# Patient Record
Sex: MALE | Race: WHITE | Employment: FULL TIME | URBAN - METROPOLITAN AREA
[De-identification: names, ages, dates, MRNs, and addresses within clinical notes are randomized per-mention and may not be internally consistent; named-entity substitution may affect disease eponyms.]

---

## 2018-04-11 ENCOUNTER — HOSPITAL ENCOUNTER (OUTPATIENT)
Dept: PREADMISSION TESTING | Age: 63
Discharge: HOME | End: 2018-04-11
Payer: COMMERCIAL

## 2018-04-11 DIAGNOSIS — Z01.818 PREOP EXAMINATION: ICD-10-CM

## 2018-04-11 LAB
ANION GAP SERPL CALC-SCNC: 3 MMOL/L (ref 3–18)
BACTERIA SPEC CULT: NORMAL
BUN SERPL-MCNC: 12 MG/DL (ref 7–18)
BUN/CREAT SERPL: 12 (ref 12–20)
CALCIUM SERPL-MCNC: 9 MG/DL (ref 8.5–10.1)
CHLORIDE SERPL-SCNC: 105 MMOL/L (ref 100–108)
CO2 SERPL-SCNC: 34 MMOL/L (ref 21–32)
CREAT SERPL-MCNC: 1 MG/DL (ref 0.6–1.3)
EST. AVERAGE GLUCOSE BLD GHB EST-MCNC: 151 MG/DL
GLUCOSE SERPL-MCNC: 133 MG/DL (ref 74–99)
HBA1C MFR BLD: 6.9 % (ref 4.5–5.6)
HCT VFR BLD AUTO: 46.5 % (ref 36–48)
HGB BLD-MCNC: 15.4 G/DL (ref 13–16)
POTASSIUM SERPL-SCNC: 5 MMOL/L (ref 3.5–5.5)
SERVICE CMNT-IMP: NORMAL
SODIUM SERPL-SCNC: 142 MMOL/L (ref 136–145)

## 2018-04-11 PROCEDURE — 36415 COLL VENOUS BLD VENIPUNCTURE: CPT | Performed by: ANESTHESIOLOGY

## 2018-04-11 PROCEDURE — 83036 HEMOGLOBIN GLYCOSYLATED A1C: CPT | Performed by: ANESTHESIOLOGY

## 2018-04-11 PROCEDURE — 93005 ELECTROCARDIOGRAM TRACING: CPT

## 2018-04-11 PROCEDURE — 80048 BASIC METABOLIC PNL TOTAL CA: CPT | Performed by: ANESTHESIOLOGY

## 2018-04-11 PROCEDURE — 87641 MR-STAPH DNA AMP PROBE: CPT | Performed by: SPECIALIST

## 2018-04-11 PROCEDURE — 85018 HEMOGLOBIN: CPT | Performed by: ANESTHESIOLOGY

## 2018-04-12 LAB
ATRIAL RATE: 66 BPM
CALCULATED P AXIS, ECG09: 35 DEGREES
CALCULATED R AXIS, ECG10: 22 DEGREES
CALCULATED T AXIS, ECG11: 20 DEGREES
DIAGNOSIS, 93000: NORMAL
P-R INTERVAL, ECG05: 188 MS
Q-T INTERVAL, ECG07: 410 MS
QRS DURATION, ECG06: 86 MS
QTC CALCULATION (BEZET), ECG08: 429 MS
VENTRICULAR RATE, ECG03: 66 BPM

## 2018-04-17 ENCOUNTER — ANESTHESIA EVENT (OUTPATIENT)
Dept: SURGERY | Age: 63
DRG: 473 | End: 2018-04-17
Payer: COMMERCIAL

## 2018-04-18 ENCOUNTER — APPOINTMENT (OUTPATIENT)
Dept: GENERAL RADIOLOGY | Age: 63
DRG: 473 | End: 2018-04-18
Attending: SPECIALIST
Payer: COMMERCIAL

## 2018-04-18 ENCOUNTER — HOSPITAL ENCOUNTER (INPATIENT)
Age: 63
LOS: 1 days | Discharge: HOME | DRG: 473 | End: 2018-04-19
Attending: SPECIALIST | Admitting: SPECIALIST
Payer: COMMERCIAL

## 2018-04-18 ENCOUNTER — ANESTHESIA (OUTPATIENT)
Dept: SURGERY | Age: 63
DRG: 473 | End: 2018-04-18
Payer: COMMERCIAL

## 2018-04-18 DIAGNOSIS — M47.22 CERVICAL SPONDYLOSIS WITH RADICULOPATHY: Primary | ICD-10-CM

## 2018-04-18 LAB
ABO + RH BLD: NORMAL
BLOOD GROUP ANTIBODIES SERPL: NORMAL
GLUCOSE BLD STRIP.AUTO-MCNC: 133 MG/DL (ref 70–110)
GLUCOSE BLD STRIP.AUTO-MCNC: 137 MG/DL (ref 70–110)
SPECIMEN EXP DATE BLD: NORMAL

## 2018-04-18 PROCEDURE — 77030002933 HC SUT MCRYL J&J -A: Performed by: SPECIALIST

## 2018-04-18 PROCEDURE — 74011250636 HC RX REV CODE- 250/636

## 2018-04-18 PROCEDURE — 74011250636 HC RX REV CODE- 250/636: Performed by: SPECIALIST

## 2018-04-18 PROCEDURE — 77030036596 HC SPCR CERV FORGE GLMB -G: Performed by: SPECIALIST

## 2018-04-18 PROCEDURE — 76060000035 HC ANESTHESIA 2 TO 2.5 HR: Performed by: SPECIALIST

## 2018-04-18 PROCEDURE — 77030018836 HC SOL IRR NACL ICUM -A: Performed by: SPECIALIST

## 2018-04-18 PROCEDURE — 77030011247 HC DRN WND KT TLS STRY -B: Performed by: SPECIALIST

## 2018-04-18 PROCEDURE — 77030011640 HC PAD GRND REM COVD -A: Performed by: SPECIALIST

## 2018-04-18 PROCEDURE — 77030013079 HC BLNKT BAIR HGGR 3M -A: Performed by: ANESTHESIOLOGY

## 2018-04-18 PROCEDURE — 76010000131 HC OR TIME 2 TO 2.5 HR: Performed by: SPECIALIST

## 2018-04-18 PROCEDURE — 82962 GLUCOSE BLOOD TEST: CPT

## 2018-04-18 PROCEDURE — 74011250637 HC RX REV CODE- 250/637: Performed by: SPECIALIST

## 2018-04-18 PROCEDURE — 36415 COLL VENOUS BLD VENIPUNCTURE: CPT | Performed by: SPECIALIST

## 2018-04-18 PROCEDURE — 0RG10K0 FUSION OF CERVICAL VERTEBRAL JOINT WITH NONAUTOLOGOUS TISSUE SUBSTITUTE, ANTERIOR APPROACH, ANTERIOR COLUMN, OPEN APPROACH: ICD-10-PCS | Performed by: SPECIALIST

## 2018-04-18 PROCEDURE — C1713 ANCHOR/SCREW BN/BN,TIS/BN: HCPCS | Performed by: SPECIALIST

## 2018-04-18 PROCEDURE — 77030032490 HC SLV COMPR SCD KNE COVD -B: Performed by: SPECIALIST

## 2018-04-18 PROCEDURE — 77030020268 HC MISC GENERAL SUPPLY: Performed by: SPECIALIST

## 2018-04-18 PROCEDURE — 77030020782 HC GWN BAIR PAWS FLX 3M -B: Performed by: SPECIALIST

## 2018-04-18 PROCEDURE — 65270000029 HC RM PRIVATE

## 2018-04-18 PROCEDURE — 86901 BLOOD TYPING SEROLOGIC RH(D): CPT | Performed by: SPECIALIST

## 2018-04-18 PROCEDURE — 77030008477 HC STYL SATN SLP COVD -A: Performed by: ANESTHESIOLOGY

## 2018-04-18 PROCEDURE — 74011000250 HC RX REV CODE- 250

## 2018-04-18 PROCEDURE — 74011000250 HC RX REV CODE- 250: Performed by: SPECIALIST

## 2018-04-18 PROCEDURE — 77030030163 HC BN WAX J&J -A: Performed by: SPECIALIST

## 2018-04-18 PROCEDURE — 77030008683 HC TU ET CUF COVD -A: Performed by: ANESTHESIOLOGY

## 2018-04-18 PROCEDURE — 77030003028 HC SUT VCRL J&J -A: Performed by: SPECIALIST

## 2018-04-18 PROCEDURE — 0RT30ZZ RESECTION OF CERVICAL VERTEBRAL DISC, OPEN APPROACH: ICD-10-PCS | Performed by: SPECIALIST

## 2018-04-18 PROCEDURE — 77030003666 HC NDL SPINAL BD -A: Performed by: SPECIALIST

## 2018-04-18 PROCEDURE — 97161 PT EVAL LOW COMPLEX 20 MIN: CPT

## 2018-04-18 PROCEDURE — 77030004391 HC BUR FLUT MEDT -C: Performed by: SPECIALIST

## 2018-04-18 PROCEDURE — 77030002916 HC SUT ETHLN J&J -A: Performed by: SPECIALIST

## 2018-04-18 PROCEDURE — 77030006643: Performed by: ANESTHESIOLOGY

## 2018-04-18 PROCEDURE — 76210000017 HC OR PH I REC 1.5 TO 2 HR: Performed by: SPECIALIST

## 2018-04-18 PROCEDURE — 77030020269 HC MISC IMPL: Performed by: SPECIALIST

## 2018-04-18 PROCEDURE — 8E09XBF COMPUTER ASSISTED PROCEDURE OF HEAD AND NECK REGION, WITH FLUOROSCOPY: ICD-10-PCS | Performed by: SPECIALIST

## 2018-04-18 PROCEDURE — 74011250636 HC RX REV CODE- 250/636: Performed by: ANESTHESIOLOGY

## 2018-04-18 DEVICE — IMPLANTABLE DEVICE: Type: IMPLANTABLE DEVICE | Site: SPINE CERVICAL | Status: FUNCTIONAL

## 2018-04-18 RX ORDER — ONDANSETRON 2 MG/ML
4 INJECTION INTRAMUSCULAR; INTRAVENOUS
Status: DISCONTINUED | OUTPATIENT
Start: 2018-04-18 | End: 2018-04-19 | Stop reason: HOSPADM

## 2018-04-18 RX ORDER — SODIUM CHLORIDE, SODIUM LACTATE, POTASSIUM CHLORIDE, CALCIUM CHLORIDE 600; 310; 30; 20 MG/100ML; MG/100ML; MG/100ML; MG/100ML
125 INJECTION, SOLUTION INTRAVENOUS CONTINUOUS
Status: DISCONTINUED | OUTPATIENT
Start: 2018-04-18 | End: 2018-04-18 | Stop reason: HOSPADM

## 2018-04-18 RX ORDER — INSULIN LISPRO 100 [IU]/ML
INJECTION, SOLUTION INTRAVENOUS; SUBCUTANEOUS
Status: DISCONTINUED | OUTPATIENT
Start: 2018-04-18 | End: 2018-04-19 | Stop reason: HOSPADM

## 2018-04-18 RX ORDER — HYDROMORPHONE HYDROCHLORIDE 2 MG/ML
0.5 INJECTION, SOLUTION INTRAMUSCULAR; INTRAVENOUS; SUBCUTANEOUS
Status: DISCONTINUED | OUTPATIENT
Start: 2018-04-18 | End: 2018-04-18 | Stop reason: RX

## 2018-04-18 RX ORDER — NEOSTIGMINE METHYLSULFATE 1 MG/ML
INJECTION INTRAVENOUS AS NEEDED
Status: DISCONTINUED | OUTPATIENT
Start: 2018-04-18 | End: 2018-04-18 | Stop reason: HOSPADM

## 2018-04-18 RX ORDER — METOCLOPRAMIDE HYDROCHLORIDE 5 MG/ML
INJECTION INTRAMUSCULAR; INTRAVENOUS AS NEEDED
Status: DISCONTINUED | OUTPATIENT
Start: 2018-04-18 | End: 2018-04-18 | Stop reason: HOSPADM

## 2018-04-18 RX ORDER — MIDAZOLAM HYDROCHLORIDE 1 MG/ML
INJECTION, SOLUTION INTRAMUSCULAR; INTRAVENOUS AS NEEDED
Status: DISCONTINUED | OUTPATIENT
Start: 2018-04-18 | End: 2018-04-18 | Stop reason: HOSPADM

## 2018-04-18 RX ORDER — ACETAMINOPHEN 10 MG/ML
1000 INJECTION, SOLUTION INTRAVENOUS ONCE
Status: COMPLETED | OUTPATIENT
Start: 2018-04-18 | End: 2018-04-18

## 2018-04-18 RX ORDER — DIPHENHYDRAMINE HCL 25 MG
25 CAPSULE ORAL
Status: DISCONTINUED | OUTPATIENT
Start: 2018-04-18 | End: 2018-04-19 | Stop reason: HOSPADM

## 2018-04-18 RX ORDER — SODIUM CHLORIDE 0.9 % (FLUSH) 0.9 %
5-10 SYRINGE (ML) INJECTION EVERY 8 HOURS
Status: DISCONTINUED | OUTPATIENT
Start: 2018-04-18 | End: 2018-04-19 | Stop reason: HOSPADM

## 2018-04-18 RX ORDER — GLYCOPYRROLATE 0.2 MG/ML
INJECTION INTRAMUSCULAR; INTRAVENOUS AS NEEDED
Status: DISCONTINUED | OUTPATIENT
Start: 2018-04-18 | End: 2018-04-18 | Stop reason: HOSPADM

## 2018-04-18 RX ORDER — OXYCODONE AND ACETAMINOPHEN 5; 325 MG/1; MG/1
1 TABLET ORAL
Status: DISCONTINUED | OUTPATIENT
Start: 2018-04-18 | End: 2018-04-19 | Stop reason: HOSPADM

## 2018-04-18 RX ORDER — SODIUM CHLORIDE 0.9 % (FLUSH) 0.9 %
5-10 SYRINGE (ML) INJECTION AS NEEDED
Status: DISCONTINUED | OUTPATIENT
Start: 2018-04-18 | End: 2018-04-18 | Stop reason: HOSPADM

## 2018-04-18 RX ORDER — CEFAZOLIN SODIUM/WATER 2 G/20 ML
2 SYRINGE (ML) INTRAVENOUS EVERY 8 HOURS
Status: DISCONTINUED | OUTPATIENT
Start: 2018-04-18 | End: 2018-04-19 | Stop reason: HOSPADM

## 2018-04-18 RX ORDER — ONDANSETRON 2 MG/ML
INJECTION INTRAMUSCULAR; INTRAVENOUS AS NEEDED
Status: DISCONTINUED | OUTPATIENT
Start: 2018-04-18 | End: 2018-04-18 | Stop reason: HOSPADM

## 2018-04-18 RX ORDER — INSULIN LISPRO 100 [IU]/ML
INJECTION, SOLUTION INTRAVENOUS; SUBCUTANEOUS ONCE
Status: DISCONTINUED | OUTPATIENT
Start: 2018-04-18 | End: 2018-04-18 | Stop reason: HOSPADM

## 2018-04-18 RX ORDER — NALOXONE HYDROCHLORIDE 0.4 MG/ML
0.4 INJECTION, SOLUTION INTRAMUSCULAR; INTRAVENOUS; SUBCUTANEOUS AS NEEDED
Status: DISCONTINUED | OUTPATIENT
Start: 2018-04-18 | End: 2018-04-19 | Stop reason: HOSPADM

## 2018-04-18 RX ORDER — ROCURONIUM BROMIDE 10 MG/ML
INJECTION, SOLUTION INTRAVENOUS AS NEEDED
Status: DISCONTINUED | OUTPATIENT
Start: 2018-04-18 | End: 2018-04-18 | Stop reason: HOSPADM

## 2018-04-18 RX ORDER — DEXAMETHASONE SODIUM PHOSPHATE 4 MG/ML
INJECTION, SOLUTION INTRA-ARTICULAR; INTRALESIONAL; INTRAMUSCULAR; INTRAVENOUS; SOFT TISSUE AS NEEDED
Status: DISCONTINUED | OUTPATIENT
Start: 2018-04-18 | End: 2018-04-18 | Stop reason: HOSPADM

## 2018-04-18 RX ORDER — DEXAMETHASONE SODIUM PHOSPHATE 4 MG/ML
4 INJECTION, SOLUTION INTRA-ARTICULAR; INTRALESIONAL; INTRAMUSCULAR; INTRAVENOUS; SOFT TISSUE EVERY 8 HOURS
Status: DISCONTINUED | OUTPATIENT
Start: 2018-04-18 | End: 2018-04-19 | Stop reason: HOSPADM

## 2018-04-18 RX ORDER — METFORMIN HYDROCHLORIDE 500 MG/1
1000 TABLET ORAL 2 TIMES DAILY WITH MEALS
Status: DISCONTINUED | OUTPATIENT
Start: 2018-04-18 | End: 2018-04-19 | Stop reason: HOSPADM

## 2018-04-18 RX ORDER — DEXTROSE 50 % IN WATER (D50W) INTRAVENOUS SYRINGE
25-50 AS NEEDED
Status: DISCONTINUED | OUTPATIENT
Start: 2018-04-18 | End: 2018-04-18 | Stop reason: HOSPADM

## 2018-04-18 RX ORDER — SODIUM CHLORIDE 0.9 % (FLUSH) 0.9 %
5-10 SYRINGE (ML) INJECTION AS NEEDED
Status: DISCONTINUED | OUTPATIENT
Start: 2018-04-18 | End: 2018-04-19 | Stop reason: HOSPADM

## 2018-04-18 RX ORDER — SIMVASTATIN 20 MG/1
20 TABLET, FILM COATED ORAL
Status: DISCONTINUED | OUTPATIENT
Start: 2018-04-18 | End: 2018-04-19 | Stop reason: HOSPADM

## 2018-04-18 RX ORDER — DOCUSATE SODIUM 100 MG/1
100 CAPSULE, LIQUID FILLED ORAL 2 TIMES DAILY
Status: DISCONTINUED | OUTPATIENT
Start: 2018-04-18 | End: 2018-04-19 | Stop reason: HOSPADM

## 2018-04-18 RX ORDER — SODIUM CHLORIDE, SODIUM LACTATE, POTASSIUM CHLORIDE, CALCIUM CHLORIDE 600; 310; 30; 20 MG/100ML; MG/100ML; MG/100ML; MG/100ML
125 INJECTION, SOLUTION INTRAVENOUS CONTINUOUS
Status: DISCONTINUED | OUTPATIENT
Start: 2018-04-18 | End: 2018-04-19 | Stop reason: HOSPADM

## 2018-04-18 RX ORDER — HYDROMORPHONE HYDROCHLORIDE 2 MG/ML
INJECTION, SOLUTION INTRAMUSCULAR; INTRAVENOUS; SUBCUTANEOUS AS NEEDED
Status: DISCONTINUED | OUTPATIENT
Start: 2018-04-18 | End: 2018-04-18 | Stop reason: HOSPADM

## 2018-04-18 RX ORDER — LISINOPRIL 20 MG/1
20 TABLET ORAL
Status: DISCONTINUED | OUTPATIENT
Start: 2018-04-18 | End: 2018-04-19 | Stop reason: HOSPADM

## 2018-04-18 RX ORDER — HYDROMORPHONE HYDROCHLORIDE 2 MG/ML
0.5 INJECTION, SOLUTION INTRAMUSCULAR; INTRAVENOUS; SUBCUTANEOUS
Status: DISCONTINUED | OUTPATIENT
Start: 2018-04-18 | End: 2018-04-18 | Stop reason: HOSPADM

## 2018-04-18 RX ORDER — CEFAZOLIN SODIUM 2 G/50ML
2 SOLUTION INTRAVENOUS ONCE
Status: COMPLETED | OUTPATIENT
Start: 2018-04-18 | End: 2018-04-18

## 2018-04-18 RX ORDER — FENTANYL CITRATE 50 UG/ML
INJECTION, SOLUTION INTRAMUSCULAR; INTRAVENOUS AS NEEDED
Status: DISCONTINUED | OUTPATIENT
Start: 2018-04-18 | End: 2018-04-18 | Stop reason: HOSPADM

## 2018-04-18 RX ORDER — PROPOFOL 10 MG/ML
INJECTION, EMULSION INTRAVENOUS AS NEEDED
Status: DISCONTINUED | OUTPATIENT
Start: 2018-04-18 | End: 2018-04-18 | Stop reason: HOSPADM

## 2018-04-18 RX ORDER — PENICILLIN V POTASSIUM 500 MG/1
500 TABLET, FILM COATED ORAL 4 TIMES DAILY
Status: ON HOLD | COMMUNITY
End: 2018-04-18 | Stop reason: ALTCHOICE

## 2018-04-18 RX ORDER — SODIUM CHLORIDE 9 MG/ML
75 INJECTION, SOLUTION INTRAVENOUS CONTINUOUS
Status: DISCONTINUED | OUTPATIENT
Start: 2018-04-18 | End: 2018-04-19 | Stop reason: HOSPADM

## 2018-04-18 RX ORDER — PHENYLEPHRINE HCL IN 0.9% NACL 1 MG/10 ML
SYRINGE (ML) INTRAVENOUS AS NEEDED
Status: DISCONTINUED | OUTPATIENT
Start: 2018-04-18 | End: 2018-04-18 | Stop reason: HOSPADM

## 2018-04-18 RX ORDER — LIDOCAINE HYDROCHLORIDE 20 MG/ML
INJECTION, SOLUTION EPIDURAL; INFILTRATION; INTRACAUDAL; PERINEURAL AS NEEDED
Status: DISCONTINUED | OUTPATIENT
Start: 2018-04-18 | End: 2018-04-18 | Stop reason: HOSPADM

## 2018-04-18 RX ADMIN — ONDANSETRON 4 MG: 2 INJECTION INTRAMUSCULAR; INTRAVENOUS at 13:57

## 2018-04-18 RX ADMIN — ROCURONIUM BROMIDE 20 MG: 10 INJECTION, SOLUTION INTRAVENOUS at 14:05

## 2018-04-18 RX ADMIN — HYDROMORPHONE HYDROCHLORIDE 1 MG: 2 INJECTION, SOLUTION INTRAMUSCULAR; INTRAVENOUS; SUBCUTANEOUS at 14:03

## 2018-04-18 RX ADMIN — NEOSTIGMINE METHYLSULFATE 1 MG: 1 INJECTION INTRAVENOUS at 15:52

## 2018-04-18 RX ADMIN — FENTANYL CITRATE 50 MCG: 50 INJECTION, SOLUTION INTRAMUSCULAR; INTRAVENOUS at 13:41

## 2018-04-18 RX ADMIN — ACETAMINOPHEN 1000 MG: 10 INJECTION, SOLUTION INTRAVENOUS at 14:00

## 2018-04-18 RX ADMIN — LISINOPRIL 20 MG: 20 TABLET ORAL at 22:00

## 2018-04-18 RX ADMIN — GLYCOPYRROLATE 0.2 MG: 0.2 INJECTION INTRAMUSCULAR; INTRAVENOUS at 15:52

## 2018-04-18 RX ADMIN — SODIUM CHLORIDE, SODIUM LACTATE, POTASSIUM CHLORIDE, AND CALCIUM CHLORIDE 125 ML/HR: 600; 310; 30; 20 INJECTION, SOLUTION INTRAVENOUS at 18:34

## 2018-04-18 RX ADMIN — HYDROMORPHONE HYDROCHLORIDE: 10 INJECTION, SOLUTION INTRAMUSCULAR; INTRAVENOUS; SUBCUTANEOUS at 16:55

## 2018-04-18 RX ADMIN — DOCUSATE SODIUM 100 MG: 100 CAPSULE, LIQUID FILLED ORAL at 22:00

## 2018-04-18 RX ADMIN — METOCLOPRAMIDE HYDROCHLORIDE 10 MG: 5 INJECTION INTRAMUSCULAR; INTRAVENOUS at 13:57

## 2018-04-18 RX ADMIN — SIMVASTATIN 20 MG: 20 TABLET, FILM COATED ORAL at 21:56

## 2018-04-18 RX ADMIN — MIDAZOLAM HYDROCHLORIDE 4 MG: 1 INJECTION, SOLUTION INTRAMUSCULAR; INTRAVENOUS at 13:41

## 2018-04-18 RX ADMIN — Medication 100 MCG: at 14:41

## 2018-04-18 RX ADMIN — SODIUM CHLORIDE, SODIUM LACTATE, POTASSIUM CHLORIDE, AND CALCIUM CHLORIDE 125 ML/HR: 600; 310; 30; 20 INJECTION, SOLUTION INTRAVENOUS at 11:58

## 2018-04-18 RX ADMIN — GLYCOPYRROLATE 0.2 MG: 0.2 INJECTION INTRAMUSCULAR; INTRAVENOUS at 13:41

## 2018-04-18 RX ADMIN — Medication 100 MCG: at 15:20

## 2018-04-18 RX ADMIN — DEXAMETHASONE SODIUM PHOSPHATE 4 MG: 4 INJECTION, SOLUTION INTRAMUSCULAR; INTRAVENOUS at 21:55

## 2018-04-18 RX ADMIN — FENTANYL CITRATE 100 MCG: 50 INJECTION, SOLUTION INTRAMUSCULAR; INTRAVENOUS at 15:13

## 2018-04-18 RX ADMIN — ROCURONIUM BROMIDE 50 MG: 10 INJECTION, SOLUTION INTRAVENOUS at 13:46

## 2018-04-18 RX ADMIN — Medication 2 G: at 21:56

## 2018-04-18 RX ADMIN — DEXAMETHASONE SODIUM PHOSPHATE 8 MG: 4 INJECTION, SOLUTION INTRA-ARTICULAR; INTRALESIONAL; INTRAMUSCULAR; INTRAVENOUS; SOFT TISSUE at 13:57

## 2018-04-18 RX ADMIN — Medication 10 ML: at 21:56

## 2018-04-18 RX ADMIN — LIDOCAINE HYDROCHLORIDE 100 MG: 20 INJECTION, SOLUTION EPIDURAL; INFILTRATION; INTRACAUDAL; PERINEURAL at 13:46

## 2018-04-18 RX ADMIN — HYDROMORPHONE HYDROCHLORIDE 1 MG: 2 INJECTION, SOLUTION INTRAMUSCULAR; INTRAVENOUS; SUBCUTANEOUS at 14:28

## 2018-04-18 RX ADMIN — Medication 100 MCG: at 14:56

## 2018-04-18 RX ADMIN — PROPOFOL 200 MG: 10 INJECTION, EMULSION INTRAVENOUS at 13:46

## 2018-04-18 RX ADMIN — CEFAZOLIN SODIUM 2 G: 2 SOLUTION INTRAVENOUS at 13:55

## 2018-04-18 RX ADMIN — FENTANYL CITRATE 50 MCG: 50 INJECTION, SOLUTION INTRAMUSCULAR; INTRAVENOUS at 13:46

## 2018-04-18 RX ADMIN — SODIUM CHLORIDE, SODIUM LACTATE, POTASSIUM CHLORIDE, AND CALCIUM CHLORIDE: 600; 310; 30; 20 INJECTION, SOLUTION INTRAVENOUS at 15:26

## 2018-04-18 RX ADMIN — SODIUM CHLORIDE, SODIUM LACTATE, POTASSIUM CHLORIDE, AND CALCIUM CHLORIDE: 600; 310; 30; 20 INJECTION, SOLUTION INTRAVENOUS at 14:38

## 2018-04-18 NOTE — PERIOP NOTES
Bedside report to receiving nurse Rubi OSBORN, current vital signs noted below. Dressing dry and intact. Family moving vehicles, but aware of patient transfer; will come to second floor waiting room as directed. No further questions from receiving nurse. PCA pump verified upon entry to inpatient room.     Visit Vitals    /77 (BP 1 Location: Left arm, BP Patient Position: At rest)    Pulse 89    Temp 98.6 °F (37 °C)    Resp 16    Ht 6' 2\" (1.88 m)    Wt 119.4 kg (263 lb 2 oz)    SpO2 94%    BMI 33.78 kg/m2     Moraima Soni RN

## 2018-04-18 NOTE — PERIOP NOTES
Dr. Moraima Newell paged, and agreeable to removing basal rate from PCA pump. Pump setting changed, and verified with Michelle Velázquez RN and [facility].

## 2018-04-18 NOTE — IP AVS SNAPSHOT
[address] [address] 
[phone] Patient: Rossana May MRN: [id number] FQN:[id number] A check michelle indicates which time of day the medication should be taken. My Medications START taking these medications Instructions Each Dose to Equal  
 Morning Noon Evening Bedtime  
 oxyCODONE-acetaminophen 5-325 mg per tablet Commonly known as:  PERCOCET Your last dose was: Your next dose is: Take 1 Tab by mouth every four (4) hours as needed. Max Daily Amount: 6 Tabs. 1 Tab CONTINUE taking these medications Instructions Each Dose to Equal  
 Morning Noon Evening Bedtime  
 lisinopril 20 mg tablet Commonly known as:  Dorathy Massed Your last dose was: Your next dose is: Take 20 mg by mouth nightly. 20 mg  
    
   
   
   
  
 metFORMIN 500 mg tablet Commonly known as:  GLUCOPHAGE Your last dose was: Your next dose is: Take 1,000 mg by mouth two (2) times daily (with meals). 1000 mg  
    
   
   
   
  
 simvastatin 20 mg tablet Commonly known as:  ZOCOR Your last dose was: Your next dose is: Take  by mouth nightly. traMADol 50 mg tablet Commonly known as:  ULTRAM  
   
Your last dose was: Your next dose is: Take 50 mg by mouth every six (6) hours as needed for Pain. 50 mg  
    
   
   
   
  
  
STOP taking these medications ADVIL 200 mg tablet Generic drug:  ibuprofen ALEVE 220 mg tablet Generic drug:  naproxen sodium ASK your doctor about these medications Instructions Each Dose to Equal  
 Morning Noon Evening Bedtime  
 penicillin v potassium 500 mg tablet Commonly known as:  VEETID Your last dose was: Your next dose is: Take 500 mg by mouth four (4) times daily. 500 mg Where to Get Your Medications Information on where to get these meds will be given to you by the nurse or doctor. ! Ask your nurse or doctor about these medications  
  oxyCODONE-acetaminophen 5-325 mg per tablet

## 2018-04-18 NOTE — IP AVS SNAPSHOT
[address] [address] 
[phone] Patient: Sven Odell MRN: [id number] UXI:[id number] About your hospitalization You were admitted on:  April 18, 2018 You last received care in the:  THE [facility] [address] You were discharged on:  April 19, 2018 Why you were hospitalized Your primary diagnosis was:  Not on File Your diagnoses also included:  Cervical Spondylosis With Radiculopathy, Pain At Surgical Incision Follow-up Information Follow up With Details Comments Contact Info Crista Bowser MD On 5/9/2018 Follow up appointment @ 10:15am [address] [place] [address] 
[phone] MD Eloise Black  
[address] [facility] Internal [address] 
[phone] Discharge Orders None A check michelle indicates which time of day the medication should be taken. My Medications START taking these medications Instructions Each Dose to Equal  
 Morning Noon Evening Bedtime  
 oxyCODONE-acetaminophen 5-325 mg per tablet Commonly known as:  PERCOCET Your last dose was: Your next dose is: Take 1 Tab by mouth every four (4) hours as needed. Max Daily Amount: 6 Tabs. 1 Tab CONTINUE taking these medications Instructions Each Dose to Equal  
 Morning Noon Evening Bedtime  
 lisinopril 20 mg tablet Commonly known as:  Susan Gonzalez Your last dose was: Your next dose is: Take 20 mg by mouth nightly. 20 mg  
    
   
   
   
  
 metFORMIN 500 mg tablet Commonly known as:  GLUCOPHAGE Your last dose was: Your next dose is: Take 1,000 mg by mouth two (2) times daily (with meals). 1000 mg  
    
   
   
   
  
 simvastatin 20 mg tablet Commonly known as:  ZOCOR Your last dose was: Your next dose is: Take  by mouth nightly. traMADol 50 mg tablet Commonly known as:  ULTRAM  
   
Your last dose was: Your next dose is: Take 50 mg by mouth every six (6) hours as needed for Pain. 50 mg  
    
   
   
   
  
  
STOP taking these medications ADVIL 200 mg tablet Generic drug:  ibuprofen ALEVE 220 mg tablet Generic drug:  naproxen sodium ASK your doctor about these medications Instructions Each Dose to Equal  
 Morning Noon Evening Bedtime  
 penicillin v potassium 500 mg tablet Commonly known as:  VEETID Your last dose was: Your next dose is: Take 500 mg by mouth four (4) times daily. 500 mg Where to Get Your Medications Information on where to get these meds will be given to you by the nurse or doctor. ! Ask your nurse or doctor about these medications  
  oxyCODONE-acetaminophen 5-325 mg per tablet Opioid Education Prescription Opioids: What You Need to Know: 
 
Prescription opioids can be used to help relieve moderate-to-severe pain and are often prescribed following a surgery or injury, or for certain health conditions. These medications can be an important part of treatment but also come with serious risks. Opioids are strong pain medicines. Examples include hydrocodone, oxycodone, fentanyl, and morphine. Heroin is an example of an illegal opioid. It is important to work with your health care provider to make sure you are getting the safest, most effective care. WHAT ARE THE RISKS AND SIDE EFFECTS OF OPIOID USE? Prescription opioids carry serious risks of addiction and overdose, especially with prolonged use. An opioid overdose, often marked by slow breathing, can cause sudden death. The use of prescription opioids can have a number of side effects as well, even when taken as directed. · Tolerance-meaning you might need to take more of a medication for the same pain relief · Physical dependence-meaning you have symptoms of withdrawal when the medication is stopped. Withdrawal symptoms can include nausea, sweating, chills, diarrhea, stomach cramps, and muscle aches. Withdrawal can last up to several weeks, depending on which drug you took and how long you took it. · Increased sensitivity to pain · Constipation · Nausea, vomiting, and dry mouth · Sleepiness and dizziness · Confusion · Depression · Low levels of testosterone that can result in lower sex drive, energy, and strength · Itching and sweating RISKS ARE GREATER WITH:      
· History of drug misuse, substance use disorder, or overdose · Mental health conditions (such as depression or anxiety) · Sleep apnea · Older age (72 years or older) · Pregnancy Avoid alcohol while taking prescription opioids. Also, unless specifically advised by your health care provider, medications to avoid include: · Benzodiazepines (such as Xanax or Valium) · Muscle relaxants (such as Soma or Flexeril) · Hypnotics (such as Ambien or Lunesta) · Other prescription opioids KNOW YOUR OPTIONS Talk to your health care provider about ways to manage your pain that don't involve prescription opioids. Some of these options may actually work better and have fewer risks and side effects. Options may include: 
· Pain relievers such as acetaminophen, ibuprofen, and naproxen · Some medications that are also used for depression or seizures · Physical therapy and exercise · Counseling to help patients learn how to cope better with triggers of pain and stress. · Application of heat or cold compress · Massage therapy · Relaxation techniques Be Informed Make sure you know the name of your medication, how much and how often to take it, and its potential risks & side effects.  
 
IF YOU ARE PRESCRIBED OPIOIDS FOR PAIN: 
 · Never take opioids in greater amounts or more often than prescribed. Remember the goal is not to be pain-free but to manage your pain at a tolerable level. · Follow up with your primary care provider to: · Work together to create a plan on how to manage your pain. · Talk about ways to help manage your pain that don't involve prescription opioids. · Talk about any and all concerns and side effects. · Help prevent misuse and abuse. · Never sell or share prescription opioids · Help prevent misuse and abuse. · Store prescription opioids in a secure place and out of reach of others (this may include visitors, children, friends, and family). · Safely dispose of unused/unwanted prescription opioids: Find your community drug take-back program or your pharmacy mail-back program, or flush them down the toilet, following guidance from the Food and Drug Administration (www.fda.gov/Drugs/ResourcesForYou). · Visit www.cdc.gov/drugoverdose to learn about the risks of opioid abuse and overdose. · If you believe you may be struggling with addiction, tell your health care provider and ask for guidance or call [address] [place], [place] [address] at [phone]-JCAY. Discharge Instructions DISCHARGE SUMMARY from Nurse PATIENT INSTRUCTIONS: 
 
 
F-face looks uneven A-arms unable to move or move unevenly S-speech slurred or non-existent T-time-call 911 as soon as signs and symptoms begin-DO NOT go Back to bed or wait to see if you get better-TIME IS BRAIN. Warning Signs of HEART ATTACK Call 911 if you have these symptoms: 
? Chest discomfort.  Most heart attacks involve discomfort in the center of the chest that lasts more than a few minutes, or that goes away and comes back. It can feel like uncomfortable pressure, squeezing, fullness, or pain. ? Discomfort in other areas of the upper body. Symptoms can include pain or discomfort in one or both arms, the back, neck, jaw, or stomach. ? Shortness of breath with or without chest discomfort. ? Other signs may include breaking out in a cold sweat, nausea, or lightheadedness. Don't wait more than five minutes to call [address]! Fast action can save your life. Calling 911 is almost always the fastest way to get lifesaving treatment. Emergency Medical Services staff can begin treatment when they arrive  up to an hour sooner than if someone gets to the hospital by car. The discharge information has been reviewed with the patient. The patient verbalized understanding. Discharge medications reviewed with the patient and appropriate educational materials and side effects teaching were provided. ___________________________________________________________________________________________________________________________________ Gamer Guides[employer] Announcement We are excited to announce that we are making your provider's discharge notes available to you in Vayusa. You will see these notes when they are completed and signed by the physician that discharged you from your recent hospital stay. If you have any questions or concerns about any information you see in Sentrigot, please call the Health Information Department where you were seen or reach out to your Primary Care Provider for more information about your plan of care. Introducing [facility] & HEALTH SERVICES! [employer] introduces Vayusa patient portal. Now you can access parts of your medical record, email your doctor's office, and request medication refills online. 1. In your internet browser, go to [email or web address] 2. Click on the First Time User? Click Here link in the Sign In box. You will see the New Member Sign Up page. 3. Enter your [employer] Access Code exactly as it appears below. You will not need to use this code after youve completed the sign-up process. If you do not sign up before the expiration date, you must request a new code. · [employer] Access Code: [email or web address] Expires: 7/10/2018 12:35 PM 
 
4. Enter the last four digits of your Social Security Number (xxxx) and Date of Birth (mm/dd/yyyy) as indicated and click Submit. You will be taken to the next sign-up page. 5. Create a [employer] ID. This will be your [employer] login ID and cannot be changed, so think of one that is secure and easy to remember. 6. Create a [employer] password. You can change your password at any time. 7. Enter your Password Reset Question and Answer. This can be used at a later time if you forget your password. 8. Enter your e-mail address. You will receive e-mail notification when new information is available in [address] 9. Click Sign Up. You can now view and download portions of your medical record. 10. Click the Download Summary menu link to download a portable copy of your medical information. If you have questions, please visit the Frequently Asked Questions section of the [employer] website. Remember, [employer] is NOT to be used for urgent needs. For medical emergencies, dial 911. Now available from your iPhone and Android! Introducing Cricket Harley As a [employer] patient, I wanted to make you aware of our electronic visit tool called Cricket Swiftalinakimberly. [employer] 24/7 allows you to connect within minutes with a medical provider 24 hours a day, seven days a week via a mobile device or tablet or logging into a secure website from your computer. You can access Cricket Harley from anywhere in the [place].  
 
A virtual visit might be right for you when you have a simple condition and feel like you just dont want to get out of bed, or cant get away from work for an appointment, when your regular [facility] provider is not available (evenings, weekends or holidays), or when youre out of town and need minor care. Electronic visits cost only $49 and if the [facility] 24/7 provider determines a prescription is needed to treat your condition, one can be electronically transmitted to a nearby pharmacy*. Please take a moment to enroll today if you have not already done so. The enrollment process is free and takes just a few minutes. To enroll, please download the YogaTrail/[employer] lonnie to your tablet or phone, or visit [email or web address] to enroll on your computer. And, as an [address] [place] [address] patient with a [employer] account, the results of your visits will be scanned into your electronic medical record and your primary care provider will be able to view the scanned results. We urge you to continue to see your regular [facility] provider for your ongoing medical care. And while your primary care provider may not be the one available when you seek a [employer] virtual visit, the peace of mind you get from getting a real diagnosis real time can be priceless. For more information on [employer], view our Frequently Asked Questions (FAQs) at [email or web address]. Sincerely, 
 
Amisha Molina MD 
Chief Medical Officer Ana Nelly Rush *:  certain medications cannot be prescribed via [employer] Unresulted Labs-Please follow up with your PCP about these lab tests Order Current Status NC XR TECHNOLOGIST SERVICE In process Providers Seen During Your Hospitalization Provider Specialty Primary office phone Clarissa Richardson MD Neurosurgery [phone] Your Primary Care Physician (PCP) Primary Care Physician Office Phone Office Fax Zeynep Winter [phone] You are allergic to the following No active allergies Recent Documentation Height Weight BMI Smoking Status 1.88 m 119.4 kg 33.78 kg/m2 Never Smoker Emergency Contacts Name Discharge Info Relation Home Work Mobile [address] CAREGIVER [3] Spouse [3] [phone] Patient Belongings The following personal items are in your possession at time of discharge: 
  Dental Appliances: None  Visual Aid: None      Home Medications: None   Jewelry: None  Clothing: Pants, Sent home, Shirt, Undergarments, Footwear, Socks, Jacket/Coat (desmond beal)    Other Valuables: None (states jose g has his cell and wallet) Please provide this summary of care documentation to your next provider. Signatures-by signing, you are acknowledging that this After Visit Summary has been reviewed with you and you have received a copy. Patient Signature:  ____________________________________________________________ Date:  ____________________________________________________________  
  
[facility] Provider Signature:  ____________________________________________________________ Date:  ____________________________________________________________

## 2018-04-18 NOTE — PERIOP NOTES
Rubi OSBORN on [address] [place] [address] not available to take report at this time, awaiting call back.

## 2018-04-18 NOTE — PERIOP NOTES
Awaiting room and RN assignment from Second floor. Family attempted to be called, but did not answer phone; will retry.

## 2018-04-18 NOTE — PERIOP NOTES
Dilaudid PCA pump connected to patient, and verified with Jacqueline Cisneros RN. Patient instructed on how to use pump, and verbalized understanding.

## 2018-04-18 NOTE — ANESTHESIA POSTPROCEDURE EVALUATION
Post-Anesthesia Evaluation and Assessment    Cardiovascular Function/Vital Signs  Visit Vitals    /76 (BP 1 Location: Left arm, BP Patient Position: At rest)    Pulse 74    Temp 36.6 °C (97.8 °F)    Resp 14    Ht 6' 2\" (1.88 m)    Wt 119.4 kg (263 lb 2 oz)    SpO2 100%    BMI 33.78 kg/m2       Patient is status post Procedure(s):  ANTERIOR CERVICAL DISKECTOMY & FUSION (C6-7) BONE AND PLATING W/C-ARM. Nausea/Vomiting: Controlled. Postoperative hydration reviewed and adequate. Pain:  Pain Scale 1: Numeric (0 - 10) (04/18/18 1750)  Pain Intensity 1: 0 (04/18/18 1750)   Managed. Neurological Status:   Neuro (WDL): Exceptions to WDL (04/18/18 1635)   At baseline. Mental Status and Level of Consciousness: Baseline and stable. Pulmonary Status:   O2 Device: Nasal cannula (04/18/18 1750)   Adequate oxygenation and airway patent. Complications related to anesthesia: None    Post-anesthesia assessment completed. No concerns. Patient has met all discharge requirements.     Signed By: Betsy Apodaca MD

## 2018-04-18 NOTE — PERIOP NOTES
TRANSFER - OUT REPORT:    Verbal report given to [facility] RN on [place] [facility]  being transferred to [address] [place] [address](unit) for routine progression of care       Report consisted of patients Situation, Background, Assessment and   Recommendations(SBAR). Information from the following report(s) SBAR and Procedure Summary was reviewed with the receiving nurse. Lines:   Peripheral IV 04/18/18 Right Forearm (Active)   Site Assessment Clean, dry, & intact 4/18/2018  4:39 PM   Phlebitis Assessment 0 4/18/2018  4:39 PM   Infiltration Assessment 0 4/18/2018  4:39 PM   Dressing Status Clean, dry, & intact 4/18/2018  4:39 PM   Dressing Type Transparent;Tape 4/18/2018  4:39 PM   Hub Color/Line Status Pink; Infusing;Patent 4/18/2018  4:39 PM      Visit Vitals    /82    Pulse 84    Temp 97.8 °F (36.6 °C)    Resp 18    Ht 6' 2\" (1.88 m)    Wt 119.4 kg (263 lb 2 oz)    SpO2 99%    BMI 33.78 kg/m2       Opportunity for questions and clarification was provided.       Patient transported with:   O2 @ 2 liters  Registered Nurse  Matt Stover RN

## 2018-04-18 NOTE — ANESTHESIA PREPROCEDURE EVALUATION
Anesthetic History   No history of anesthetic complications            Review of Systems / Medical History  Patient summary reviewed, nursing notes reviewed and pertinent labs reviewed    Pulmonary  Within defined limits                 Neuro/Psych   Within defined limits           Cardiovascular    Hypertension              Exercise tolerance: >4 METS     GI/Hepatic/Renal     GERD: well controlled           Endo/Other    Diabetes  Hypothyroidism  Arthritis     Other Findings            Physical Exam    Airway  Mallampati: II  TM Distance: 4 - 6 cm  Neck ROM: normal range of motion   Mouth opening: Normal     Cardiovascular  Regular rate and rhythm,  S1 and S2 normal,  no murmur, click, rub, or gallop  Rhythm: regular  Rate: normal         Dental    Dentition: Caps/crowns     Pulmonary  Breath sounds clear to auscultation               Abdominal  GI exam deferred       Other Findings            Anesthetic Plan    ASA: 2  Anesthesia type: general          Induction: Intravenous  Anesthetic plan and risks discussed with: Patient and Spouse      Patient and spouse informed of risk of dental damage periop. They understand and desire to proceed.

## 2018-04-19 VITALS
WEIGHT: 263.13 LBS | TEMPERATURE: 98 F | HEART RATE: 89 BPM | DIASTOLIC BLOOD PRESSURE: 84 MMHG | OXYGEN SATURATION: 95 % | RESPIRATION RATE: 16 BRPM | HEIGHT: 74 IN | SYSTOLIC BLOOD PRESSURE: 120 MMHG | BODY MASS INDEX: 33.77 KG/M2

## 2018-04-19 PROBLEM — L76.82 PAIN AT SURGICAL INCISION: Status: ACTIVE | Noted: 2018-04-19

## 2018-04-19 LAB
GLUCOSE BLD STRIP.AUTO-MCNC: 189 MG/DL (ref 70–110)
GLUCOSE BLD STRIP.AUTO-MCNC: 221 MG/DL (ref 70–110)

## 2018-04-19 PROCEDURE — 74011250636 HC RX REV CODE- 250/636: Performed by: SPECIALIST

## 2018-04-19 PROCEDURE — 82962 GLUCOSE BLOOD TEST: CPT

## 2018-04-19 PROCEDURE — 74011250637 HC RX REV CODE- 250/637: Performed by: SPECIALIST

## 2018-04-19 PROCEDURE — 99218 HC RM OBSERVATION: CPT

## 2018-04-19 PROCEDURE — 74011636637 HC RX REV CODE- 636/637: Performed by: SPECIALIST

## 2018-04-19 PROCEDURE — 97116 GAIT TRAINING THERAPY: CPT

## 2018-04-19 RX ORDER — OXYCODONE AND ACETAMINOPHEN 5; 325 MG/1; MG/1
1 TABLET ORAL
Qty: 30 TAB | Refills: 0 | Status: SHIPPED | OUTPATIENT
Start: 2018-04-19 | End: 2018-06-17

## 2018-04-19 RX ADMIN — Medication 2 G: at 06:21

## 2018-04-19 RX ADMIN — INSULIN LISPRO 2 UNITS: 100 INJECTION, SOLUTION INTRAVENOUS; SUBCUTANEOUS at 09:02

## 2018-04-19 RX ADMIN — INSULIN LISPRO 4 UNITS: 100 INJECTION, SOLUTION INTRAVENOUS; SUBCUTANEOUS at 00:28

## 2018-04-19 RX ADMIN — DEXAMETHASONE SODIUM PHOSPHATE 4 MG: 4 INJECTION, SOLUTION INTRAMUSCULAR; INTRAVENOUS at 06:21

## 2018-04-19 RX ADMIN — DOCUSATE SODIUM 100 MG: 100 CAPSULE, LIQUID FILLED ORAL at 09:02

## 2018-04-19 RX ADMIN — Medication 10 ML: at 06:21

## 2018-04-19 RX ADMIN — METFORMIN HYDROCHLORIDE 1000 MG: 500 TABLET, FILM COATED ORAL at 09:02

## 2018-04-19 NOTE — ROUTINE PROCESS
Bedside and Verbal shift change report given to BETTY Davidson RN (oncoming nurse) by Tim Brice RN (offgoing nurse). Report included the following information SBAR, Kardex and MAR.

## 2018-04-19 NOTE — ROUTINE PROCESS
TRANSFER - IN REPORT:    Verbal report received from JADEN Buchanan RN(name) on Niki Farr  being received from [email or web address]) for routine progression of care      Report consisted of patients Situation, Background, Assessment and   Recommendations(SBAR). Information from the following report(s) SBAR, Kardex, Intake/Output and MAR was reviewed with the receiving nurse. Opportunity for questions and clarification was provided. Assessment completed upon patients arrival to unit and care assumed. Skin assessment completed on arrival to floor with Aysha OSBORN. No skin issues found, other than expected incision to anterior neck with dressing in place.

## 2018-04-19 NOTE — PROGRESS NOTES
1851- Patient arrives to unit at this time. Admission completed at this time. Patient is A/O x 4. IV to right forearm intact and patent. SCDs  applied to bilateral legs. Tegaderm with occlusive dressing to anterior neck CDI. Cervical collar in place. No numbness/tingling. Radial and pedal pulses palpable. Pain 0/10. Patient was oriented to the room to include use of call bell, meal ordering, and use of incentive spirometer. Patient was given explanation of \" up for dinner\" program and has verbalized understanding. Phone and call bell left within reach. Plan of care for the day addressed with patient. Educated on pain medication availability and possible side effects.

## 2018-04-19 NOTE — PROGRESS NOTES
Problem: Mobility Impaired (Adult and Pediatric)  Goal: *Acute Goals and Plan of Care (Insert Text)  Goals to be addressed in 1-4 days:  STG  1. Rolling L to R to L modified independent for positioning. 2. Supine to sit to supine S with HR for meals. 3. Sit to stand to sit S with RW in prep for ambulation. LTG  1. Ambulate >150ft S with RW, WBAT, for home/community mobility. 2. Ascend/descend a >4 stair steps CGA with HR for home entry. 3. Tolerate up in chair 1-2 hours for ADLs. 4. Patient/family to be independent with HEP for follow-up care and safe discharge. Outcome: Resolved/Met Date Met: 04/19/18  physical Therapy TREATMENT/DISCHARGE    Patient: Jony Mensah (89 y.o. male)  Date: 4/19/2018  Diagnosis: CERVICAL HNP  Cervical spondylosis with radiculopathy  Cervical spondylosis with radiculopathy <principal problem not specified>  Procedure(s) (LRB):  ANTERIOR CERVICAL DISKECTOMY & FUSION (C6-7) BONE AND PLATING W/C-ARM (N/A) 1 Day Post-Op  Precautions: Fall  Chart, physical therapy assessment, plan of care and goals were reviewed. ASSESSMENT:  Patient has met short and long term goals at this time. Patient performed sit to/from stand with [place], ambulated 200 ft without AD, Mod!, and ascended/descended 6 stairs with R handrails and Estiven. Pt is cleared from inpatient physical therapy services at this time and home health physical therapy is recommended upon discharge from hospital.  Progression toward goals:  [x]      Goals met  []      Improving appropriately and progressing toward goals  []      Improving slowly and progressing toward goals  []      Not making progress toward goals and plan of care will be adjusted     PLAN:  Patient will be discharged from physical therapy at this time.   Rationale for discharge:  [x] Goals Achieved  [] [address]  [] Patient not participating in therapy  [] Other:  Discharge Recommendations:  Home Health vs Outpatient  Further Equipment Recommendations for Discharge:  N/A     SUBJECTIVE:   Patient stated I am doing a little better today, throat still sore.     OBJECTIVE DATA SUMMARY:   Critical Behavior:  Neurologic State: Alert, Appropriate for age  Functional Mobility Training:  Bed Mobility:  Supine to Sit: Modified independent  Sit to Supine: Modified independent  Transfers:  Sit to Stand: Modified independent  Stand to Sit: Modified independent  Balance:  Sitting: Intact  Standing: Intact; Without support  Ambulation/Gait Training:  Distance (ft): 200 Feet (ft)  Assistive Device: Gait belt  Ambulation - Level of Assistance: Modified independent  Gait Abnormalities: Decreased step clearance  Stairs:  Number of Stairs Trained: 6  Stairs - Level of Assistance: Modified independent   Rail Use: Right   Pain:  Pain Scale 1: Numeric (0 - 10)  Pain Intensity 1: 2  Pain Location 1: Neck  Pain Orientation 1: Anterior  Pain Description 1: Aching  Pain Intervention(s) 1: Declines  Activity Tolerance:   Good  Please refer to the flowsheet for vital signs taken during this treatment.   After treatment:   [x] Patient left in no apparent distress sitting up in chair  [] Patient left in no apparent distress in bed  [x] Call bell left within reach  [x] Nursing notified  [] Caregiver present  [] Bed alarm activated  Merlin Sas Titcomb   Time Calculation: 9 mins

## 2018-04-19 NOTE — PROGRESS NOTES
Problem: Falls - Risk of  Goal: *Absence of Falls  Document Carlton Fall Risk and appropriate interventions in the flowsheet.    Outcome: Progressing Towards Goal  Fall Risk Interventions:  Mobility Interventions: OT consult for ADLs, PT Consult for assist device competence, Utilize walker, cane, or other assitive device, Patient to call before getting OOB         Medication Interventions: Patient to call before getting OOB, Teach patient to arise slowly    Elimination Interventions: Call light in reach, Patient to call for help with toileting needs, Urinal in reach, Toileting schedule/hourly rounds

## 2018-04-19 NOTE — DISCHARGE INSTRUCTIONS
DISCHARGE SUMMARY from Nurse    PATIENT INSTRUCTIONS:    After general anesthesia or intravenous sedation, for 24 hours or while taking prescription Narcotics:  · Limit your activities  · Do not drive and operate hazardous machinery  · Do not make important personal or business decisions  · Do  not drink alcoholic beverages  · If you have not urinated within 8 hours after discharge, please contact your surgeon on call. Report the following to your surgeon:  · Excessive pain, swelling, redness or odor of or around the surgical area  · Temperature over 100.5  · Nausea and vomiting lasting longer than 4 hours or if unable to take medications  · Any signs of decreased circulation or nerve impairment to extremity: change in color, persistent  numbness, tingling, coldness or increase pain  · Any questions    What to do at Home:  Recommended activity: Activity as tolerated,         *  Please give a list of your current medications to your Primary Care Provider. *  Please update this list whenever your medications are discontinued, doses are      changed, or new medications (including over-the-counter products) are added. *  Please carry medication information at all times in case of emergency situations. These are general instructions for a healthy lifestyle:    No smoking/ No tobacco products/ Avoid exposure to second hand smoke  Surgeon General's Warning:  Quitting smoking now greatly reduces serious risk to your health. Obesity, smoking, and sedentary lifestyle greatly increases your risk for illness    A healthy diet, regular physical exercise & weight monitoring are important for maintaining a healthy lifestyle    You may be retaining fluid if you have a history of heart failure or if you experience any of the following symptoms:  Weight gain of 3 pounds or more overnight or 5 pounds in a week, increased swelling in our hands or feet or shortness of breath while lying flat in bed.   Please call your doctor as soon as you notice any of these symptoms; do not wait until your next office visit. Recognize signs and symptoms of STROKE:    F-face looks uneven    A-arms unable to move or move unevenly    S-speech slurred or non-existent    T-time-call 911 as soon as signs and symptoms begin-DO NOT go       Back to bed or wait to see if you get better-TIME IS BRAIN. Warning Signs of HEART ATTACK     Call 911 if you have these symptoms:   Chest discomfort. Most heart attacks involve discomfort in the center of the chest that lasts more than a few minutes, or that goes away and comes back. It can feel like uncomfortable pressure, squeezing, fullness, or pain.  Discomfort in other areas of the upper body. Symptoms can include pain or discomfort in one or both arms, the back, neck, jaw, or stomach.  Shortness of breath with or without chest discomfort.  Other signs may include breaking out in a cold sweat, nausea, or lightheadedness. Don't wait more than five minutes to call 911 - MINUTES MATTER! Fast action can save your life. Calling 911 is almost always the fastest way to get lifesaving treatment. Emergency Medical Services staff can begin treatment when they arrive -- up to an hour sooner than if someone gets to the hospital by car. The discharge information has been reviewed with the patient. The patient verbalized understanding. Discharge medications reviewed with the patient and appropriate educational materials and side effects teaching were provided.   ___________________________________________________________________________________________________________________________________

## 2018-04-19 NOTE — PROGRESS NOTES
2044: Assessment completed and documented. Patient alert and oriented x 4, incision to ANTERIOR NECK. Non-adhesive tape and transparent film dressing clean, dry and intact. Pain 0/10 on a 0-10/10 numeric scale. PCA Dilaudid in use. Patient denies numbness or tingling to bilateral lower and upper extremities. No nausea, no SOB, no distress. Patient educated on IS, and \"up for dinner program\", patient verbalized understanding. TLS drain intact. Wife at bedside. 2156: Patient assisted to the bathroom, pain 4/10, sore when swallowing. PCA encouraged. 0028: Patient assisted to the bathroom, pain 4/10, sore when swallowing. PCA encouraged. Blood sugar 221, patient explained the Decadron can increase blood sugar. 4 units of Humalog given per [facility]     2675: Patient assisted to the bathroom, pain 4/10, sore when swallowing. PCA encouraged. 0630: Patient assisted to the bathroom, pain 4/10, sore when swallowing. Up to chair.

## 2018-04-19 NOTE — PROGRESS NOTES
Problem: Mobility Impaired (Adult and Pediatric)  Goal: *Acute Goals and Plan of Care (Insert Text)  Goals to be addressed in 1-4 days:  STG  1. Rolling L to R to L modified independent for positioning. 2. Supine to sit to supine S with HR for meals. 3. Sit to stand to sit S with RW in prep for ambulation. LTG  1. Ambulate >150ft S with RW, WBAT, for home/community mobility. 2. Ascend/descend a >4 stair steps CGA with HR for home entry. 3. Tolerate up in chair 1-2 hours for ADLs. 4. Patient/family to be independent with HEP for follow-up care and safe discharge. Outcome: Progressing Towards Goal  physical Therapy EVALUATION    Patient: Manish Forte (65 y.o. male)  Date: 4/18/2018  Primary Diagnosis: CERVICAL HNP  Cervical spondylosis with radiculopathy  Procedure(s) (LRB):  ANTERIOR CERVICAL DISKECTOMY & FUSION (C6-7) BONE AND PLATING W/C-ARM (N/A) Day of Surgery   Precautions:   Fall    ASSESSMENT :  Based on the objective data described below, the patient presents with lower extremity weakness, decreased gait quality and endurance, and overall limitations in functional mobility s/p surgery noted above. Pt with whispering to voice. Pt performed supine to sit with CGA, sit to stand with CGA. Patient ambulated 30 feet without AD, GB applied, CGA. Pt tolerated session well as evidenced by no c/o increased pain, mild lightheadedness with initial sitting and standing, reduced with rest. Patient would benefit from skilled inpatient physical therapy to address deficits, progress as tolerated to achieve long term goals and allow safe discharge. Patient will benefit from skilled intervention to address the above impairments.   Patients rehabilitation potential is considered to be Good  Factors which may influence rehabilitation potential include:   []         None noted  []         Mental ability/status  [x]         Medical condition  []         Home/family situation and support systems  []         Safety awareness  [x]         Pain tolerance/management  []         Other:      PLAN :  Recommendations and Planned Interventions:  [x]           Bed Mobility Training             [x]    Neuromuscular Re-Education  [x]           Transfer Training                   []    Orthotic/Prosthetic Training  [x]           Gait Training                          []    Modalities  [x]           Therapeutic Exercises          []    Edema Management/Control  [x]           Therapeutic Activities            [x]    Patient and Family Training/Education  []           Other (comment):    Frequency/Duration: Patient will be followed by physical therapy twice daily to address goals. Discharge Recommendations: Home Health  Further Equipment Recommendations for Discharge: N/A     SUBJECTIVE:   Patient stated I am doing ok.     OBJECTIVE DATA SUMMARY:     Past Medical History:   Diagnosis Date    Arthritis     Cancer ([facility] [address]     Basal cell skin cancers    Chronic low back pain     Diabetic neuropathy (HCC)     [facility]     GERD (gastroesophageal reflux disease)     Hematuria     HTN (hypertension)     Hyperlipidemia     Pyogenic granuloma of skin and subcutaneous tissue     Thyroid disease     Varicella      Past Surgical History:   Procedure Laterality Date    HX APPENDECTOMY      HX ENDOSCOPY      HX HEENT      partial thyroidectomy    HX UROLOGICAL  2010's    Cystoscopy, ureteroscopy with stent placement    NEUROLOGICAL PROCEDURE UNLISTED      Epidural steroid injections     Barriers to Learning/Limitations: None  Compensate with: N/A  Prior Level of Function/Home Situation: Independent amb s/AD  Home Situation  Home Environment: Private residence  # Steps to Enter: 4  Rails to Enter: Yes  Hand Rails : Right  One/Two Story Residence: Two story, live on 1st floor  Living Alone: No  Support Systems: Spouse/Significant Other/Partner  Patient Expects to be Discharged to[de-identified] Private residence  Current DME Used/Available at Home: None  Critical Behavior:  Neurologic State: Alert  Psychosocial  Purposeful Interaction: Yes  Pt Identified Daily Priority: Clinical issues (comment)  Caring Interventions: Reassure  Skin Condition/Temp: Dry;Warm  Skin Integrity: Incision (comment) (to neck)  Skin Integumentary  Skin Color: Appropriate for ethnicity  Skin Condition/Temp: Dry;Warm  Skin Integrity: Incision (comment) (to neck)  Turgor: Non-tenting  Strength:    Strength: Generally decreased, functional  Tone & Sensation:   Sensation: Intact  Range Of Motion:  AROM: Generally decreased, functional  Functional Mobility:  Bed Mobility:  Supine to Sit: Contact guard assistance  Sit to Supine: Contact guard assistance  Transfers:  Sit to Stand: Contact guard assistance  Stand to Sit: Contact guard assistance  Balance:   Sitting: Intact  Standing: Impaired; With support  Standing - Static: Good  Standing - Dynamic : Fair  Ambulation/Gait Training:  Distance (ft): 30 Feet (ft)  Assistive Device: Gait belt;Walker, rolling  Ambulation - Level of Assistance: Contact guard assistance  Gait Description (WDL): Exceptions to WDL  Gait Abnormalities: Decreased step clearance; Path deviations  Base of Support: Widened  Speed/Adriana: Slow  Step Length: Right shortened;Left shortened  Swing Pattern: Right asymmetrical;Left asymmetrical  Pain:  Pain Scale 1: Numeric (0 - 10)  Pain Intensity 1: 0  Pain Location 1: Back; Shoulder  Pain Orientation 1: Lower;Right  Pain Description 1: Dull;Aching  Pain Intervention(s) 1: Encouraged PCA  Activity Tolerance:   Good  Please refer to the flowsheet for vital signs taken during this treatment.   After treatment:   []         Patient left in no apparent distress sitting up in chair  [x]         Patient left in no apparent distress in bed  [x]         Call bell left within reach  [x]         Nursing notified  []         Caregiver present  []         Bed alarm activated    COMMUNICATION/EDUCATION:   [x] Fall prevention education was provided and the patient/caregiver indicated understanding. [x]         Patient/family have participated as able in goal setting and plan of care. [x]         Patient/family agree to work toward stated goals and plan of care. []         Patient understands intent and goals of therapy, but is neutral about his/her participation. []         Patient is unable to participate in goal setting and plan of care. Thank you for this referral.  Brittany Griffith   Time Calculation: 18 mins Eval Complexity: History: MEDIUM  Complexity : 1-2 comorbidities / personal factors will impact the outcome/ POC Exam:LOW Complexity : 1-2 Standardized tests and measures addressing body structure, function, activity limitation and / or participation in recreation  Presentation: LOW Complexity : Stable, uncomplicated  Clinical Decision Making:Low Complexity amb 30 ft s/AD, CGA for safety Overall Complexity:LOW  Mobility  Current  CJ= 20-39%   Goal  CI= 1-19%. The severity rating is based on the Level of Assistance required for Functional Mobility and ADLs.

## 2018-04-19 NOTE — PROGRESS NOTES
Chart reviewed, met with pt and family in room prior to discharge home. Pt expresses no needs for discharge home, has wife available to assist. Pt will follow up with MD as scheduled. Care Management Interventions  PCP Verified by CM: Yes  Transition of Care Consult (CM Consult): Discharge Planning  Discharge Durable Medical Equipment: No  Physical Therapy Consult: Yes  Occupational Therapy Consult: Yes  Current Support Network: Lives with Spouse  Confirm Follow Up Transport: Family  Plan discussed with Pt/Family/Caregiver: Yes  Freedom of Choice Offered: Yes  Discharge Location  Discharge Placement: Home with family assistance    Transition of Care (AR) Plan:           AR Transportation:   How is patient being transported at discharge? Family/Friend      When? Once cleared by Therapy between 12-2pm     Is transport scheduled? N/A      Follow-up appointment and transportation:   PCP/Specialist?  See AVS for Appointment         Who is transporting to the follow-up appointment? Family/Friend      Is transport for follow up appointment scheduled? N/A    Communication plan (with patient/family): Who is being called? Patient or Next of Kin? Responsible party? Patient      What number(s) is to be used? See Facesheet      What service provider is calling for [facility] services? When are they calling? 24-48 hours following discharge    Readmission Risk?   (Green/Low; Yellow/Moderate; Red/High):  Prachi Cope

## 2018-04-19 NOTE — PROGRESS NOTES
0740  Assumed care of pt at this time. Assessment complete. Pt alert and oriented x 4. Denies SOB and chest pain. Pt lungs clear bilaterally. Cap refill  less than 3 seconds. Pt denies numbness and tingling to all extremities. Stated pain 2/10. Pt has 20  G IV to right forearm. Pt has nonadherent/transparent dressing to anterior neck. SCD's in place. Pt encouraged to continue use of IS. Pt verbalized understanding. Ice pack applied. Call light and possessions within reach. Bed in low position. Will continue to monitor. 0900  Dr Nigel Kelly stated pt may d/c when he is ready. 2pm antibiotic may be cancelled per Dr Nigel Kelly verbal order. Pt request cepacol for sore throat Verbal order with readback from Dr Alicia Rodriguez.    0930  Dressing changed TLS removed.  Incision site CDI no s/s of infection

## 2018-04-19 NOTE — ROUTINE PROCESS
Bedside and Verbal shift change report given to Tram Rogers (oncoming nurse) by Silvana Capellan RN (offgoing nurse). Report included the following information SBAR, Kardex, Intake/Output and MAR.

## 2018-04-19 NOTE — PROGRESS NOTES
1038 This writer has reviewed discharge instructions with patient at this time. Patient has verbalized understanding. Patient was provided with care notes to include side effects of RX's. IV removed, patient tolerated well. Arm bands removed and shredded. AVS were reviewed with Carol Aaron RN. Patient given 2 x-ray discs at discharge.

## 2018-04-19 NOTE — OP NOTES
[facility]    Omer Lopez  MR#: [id number]  : 1955  ACCOUNT #: [de-identified]   DATE OF SERVICE: 2018    PREOPERATIVE DIAGNOSIS:  Cervical spondylosis with cervical herniated nucleus pulposus C6-C7 with cervical radiculopathy. POSTOPERATIVE DIAGNOSIS:  Cervical spondylosis with cervical herniated nucleus pulposus C6-C7 with cervical radiculopathy. PROCEDURE PERFORMED:    1. Anterior cervical diskectomy C6-7 interspace placement. 2.  Bilateral foraminotomy C6-7 placed in brace. 3.  Allograft inserted C6-7 for cervical arthrodesis. OPERATION PERFORMED:  Anterior plating cervical spine with Globus plate, all with microsurgical technique. SURGEON:  Fanny Pierre MD      ANESTHESIA:  General, Dr. Sg Lazaro. BLOOD LOSS:  Approximately 100. COUNTS:  All counts were correct. DRAIN:  One drain. HISTORY:  The patient was brought to surgery with bilateral arm pain, numbness in the left upper extremity, impressively so with a large disk osteophyte complex at C6-C7 causing cord and root compression. Details of the surgery are in the record. Before surgery, I had detail discussion with the patient regarding the operation, risks, expectation of surgery. The second discussion, his wife was present. I answered all her questions as well. Today, I re-answered questions preoperatively before any sedation was given. OPERATION:  The patient was placed on the operating table in supine position. General endotracheal anesthesia induction without difficulty. Next, prepped and draped in sterile field. The patient given intravenous antibiotics. Image intensifier was draped in sterile field. X-rays were taken to verify the anatomy. Incision made in the crease at the right of the midline.   Sharp dissection was carried out in the superior structure and blunt dissection, deep structure of the neck, carefully tracking carotid sheath laterally and off the trachea medially. The prevertebral fascia was opened and I documented the anatomy with the fluoroscopy. I then dissected the longus colli muscle spine and removed the anterior osteophytes with a rongeur, had good affect and good exposure at the upper body of C6 and lower body of C7, confirmed by x-ray. Using a microscope, a drilled a rectangular opening of the disk space and followed disk space all the way down to the final cartilaginous endplate, removed all the way down and found mostly osteophyte with some degree of disk herniation. I removed the ligament and looked at dura. When I finished, all the midline compression was resolved. I performed bilateral foraminotomies, left side neural foramen was tighter than right. When I finished, I compressed the nerve root, got the neural foramen on both sides, vertebral bodies removed nicely independently of each other. It was felt appropriate compressive pathology had been found and as described, no spinal fluid was seen. The wound was irrigated with antibiotic solution. I sized to the correct size, cadaver graft and inserted the graft in good position by x-ray. Before putting the graft in, I placed Surgicel and Gelfoam over the exposed nerve root and dura. I prepared the surface of the spine and fitted correct size Globus plate, the body of C6 and C7 self-tapping screws. I had good bony purchase with the screws. The plating system locked down nicely. A final x-ray looked good. Irrigated out thoroughly. A waited a couple of minutes and cauterized a few muscle bleeders. Hemostasis was excellent. I passed a TLS drain through a separate stab wound down to decompressive area and sutured the drain to the skin. The wound was closed carefully with #2 interrupted Vicryl, platysmal layer 3-0 subcuticular suture of Monocryl, Dermabond on the skin after the wound was closed and drain worked nicely with minimal blood coming out.   Standard dressing applied, patient placed in cervical collar and transferred to PACU having tolerated the procedure well.       MD Norma Larry / Denzel Rebollar  D: 04/18/2018 16:12     T: 04/19/2018 05:37  JOB #: [id number]

## 2018-04-19 NOTE — DISCHARGE SUMMARY
Discharge Summary     PATIENT ID:  Clementine Bradley  65 y.o.  1955    PHYSICIAN:  Charlane Siemens, MD, MD .  ADMIT DATE: 4/18/2018   DISCHARGE DATE: 4-19-18       DISCHARGE DIAGNOSIS:  Cervical spondylosis with radiculopathy    OPERATIVE PROCEDURES:  Anterior cervical discectomy with fusion 6=7        HOSPITAL COURSE:  Tolerated the operative procedure well and was taken to PACU and then to the floor. As of d/c the patient is ambulating, tolerating p.o., and voiding without difficulty. PHYSICAL EXAM:  Visit Vitals    /84    Pulse 89    Temp 98 °F (36.7 °C)    Resp 16    Ht 6' 2\" (1.88 m)    Wt 119.4 kg (263 lb 2 oz)    SpO2 95%    BMI 33.78 kg/m2     Alert and appropriate. Motor and sensory function are grossly intact. The wound is clean/dry/intact and flat. RELAVENT LABS (within last 72 hrs):    Cbc, chem profile      CONDITION AT DISCHARGE:   Neurologically stable, ambulating, taking PO medications. Current Discharge Medication List      START taking these medications    Details   oxyCODONE-acetaminophen (PERCOCET) 5-325 mg per tablet Take 1 Tab by mouth every four (4) hours as needed. Max Daily Amount: 6 Tabs. Qty: 30 Tab, Refills: 0    Associated Diagnoses: Cervical spondylosis with radiculopathy         CONTINUE these medications which have NOT CHANGED    Details   lisinopril (PRINIVIL, ZESTRIL) 20 mg tablet Take 20 mg by mouth nightly. traMADol (ULTRAM) 50 mg tablet Take 50 mg by mouth every six (6) hours as needed for Pain.      metFORMIN (GLUCOPHAGE) 500 mg tablet Take 1,000 mg by mouth two (2) times daily (with meals). simvastatin (ZOCOR) 20 mg tablet Take  by mouth nightly.          STOP taking these medications       ibuprofen (ADVIL) 200 mg tablet Comments:   Reason for Stopping:         naproxen sodium (ALEVE) 220 mg tablet Comments:   Reason for Stopping:         penicillin v potassium (VEETID) 500 mg tablet Comments:   Reason for Stopping: DISPOSITION:  Home   -F/u in 3 weeks (the patient should call [phone] for the appointment)   -Activity instructions have been given in the office and by nursing.     CONSULTANTS:  [unfilled]        PCP:  Lewis Means MD, MD    DISCHARGING PHYSICIAN: Annita Martinez MD, MD

## 2018-06-11 ENCOUNTER — HOSPITAL ENCOUNTER (OUTPATIENT)
Dept: PREADMISSION TESTING | Age: 63
Discharge: HOME | End: 2018-06-11
Attending: SPECIALIST
Payer: COMMERCIAL

## 2018-06-11 LAB
ABO + RH BLD: NORMAL
ANION GAP SERPL CALC-SCNC: 11 MMOL/L (ref 3–18)
BACTERIA SPEC CULT: NORMAL
BLOOD GROUP ANTIBODIES SERPL: NORMAL
BUN SERPL-MCNC: 9 MG/DL (ref 7–18)
BUN/CREAT SERPL: 9 (ref 12–20)
CALCIUM SERPL-MCNC: 8.4 MG/DL (ref 8.5–10.1)
CHLORIDE SERPL-SCNC: 104 MMOL/L (ref 100–108)
CO2 SERPL-SCNC: 26 MMOL/L (ref 21–32)
CREAT SERPL-MCNC: 0.98 MG/DL (ref 0.6–1.3)
EST. AVERAGE GLUCOSE BLD GHB EST-MCNC: 220 MG/DL
GLUCOSE SERPL-MCNC: 228 MG/DL (ref 74–99)
HBA1C MFR BLD: 9.3 % (ref 4.5–5.6)
HCT VFR BLD AUTO: 40.9 % (ref 36–48)
HGB BLD-MCNC: 13.8 G/DL (ref 13–16)
POTASSIUM SERPL-SCNC: 4.2 MMOL/L (ref 3.5–5.5)
SERVICE CMNT-IMP: NORMAL
SODIUM SERPL-SCNC: 141 MMOL/L (ref 136–145)
SPECIMEN EXP DATE BLD: NORMAL

## 2018-06-11 PROCEDURE — 86900 BLOOD TYPING SEROLOGIC ABO: CPT | Performed by: ANESTHESIOLOGY

## 2018-06-11 PROCEDURE — 87641 MR-STAPH DNA AMP PROBE: CPT | Performed by: SPECIALIST

## 2018-06-11 PROCEDURE — 80048 BASIC METABOLIC PNL TOTAL CA: CPT | Performed by: SPECIALIST

## 2018-06-11 PROCEDURE — 36415 COLL VENOUS BLD VENIPUNCTURE: CPT | Performed by: SPECIALIST

## 2018-06-11 PROCEDURE — 85018 HEMOGLOBIN: CPT | Performed by: SPECIALIST

## 2018-06-11 PROCEDURE — 83036 HEMOGLOBIN GLYCOSYLATED A1C: CPT | Performed by: SPECIALIST

## 2018-06-12 ENCOUNTER — ANESTHESIA EVENT (OUTPATIENT)
Dept: SURGERY | Age: 63
DRG: 455 | End: 2018-06-12
Payer: COMMERCIAL

## 2018-06-13 ENCOUNTER — HOSPITAL ENCOUNTER (INPATIENT)
Age: 63
LOS: 4 days | Discharge: HOME HEALTH | DRG: 455 | End: 2018-06-17
Attending: SPECIALIST | Admitting: SPECIALIST
Payer: COMMERCIAL

## 2018-06-13 ENCOUNTER — APPOINTMENT (OUTPATIENT)
Dept: GENERAL RADIOLOGY | Age: 63
DRG: 455 | End: 2018-06-13
Attending: SPECIALIST
Payer: COMMERCIAL

## 2018-06-13 ENCOUNTER — ANESTHESIA (OUTPATIENT)
Dept: SURGERY | Age: 63
DRG: 455 | End: 2018-06-13
Payer: COMMERCIAL

## 2018-06-13 DIAGNOSIS — M48.062 LUMBAR STENOSIS WITH NEUROGENIC CLAUDICATION: Primary | ICD-10-CM

## 2018-06-13 LAB
GLUCOSE BLD STRIP.AUTO-MCNC: 194 MG/DL (ref 70–110)
GLUCOSE BLD STRIP.AUTO-MCNC: 212 MG/DL (ref 70–110)
GLUCOSE BLD STRIP.AUTO-MCNC: 264 MG/DL (ref 70–110)
GLUCOSE BLD STRIP.AUTO-MCNC: 301 MG/DL (ref 70–110)

## 2018-06-13 PROCEDURE — 0SG1071 FUSION OF 2 OR MORE LUMBAR VERTEBRAL JOINTS WITH AUTOLOGOUS TISSUE SUBSTITUTE, POSTERIOR APPROACH, POSTERIOR COLUMN, OPEN APPROACH: ICD-10-PCS | Performed by: SPECIALIST

## 2018-06-13 PROCEDURE — 77030032490 HC SLV COMPR SCD KNE COVD -B: Performed by: SPECIALIST

## 2018-06-13 PROCEDURE — 77030004373 HC BUR DMND MEDT -C: Performed by: SPECIALIST

## 2018-06-13 PROCEDURE — 77030008683 HC TU ET CUF COVD -A: Performed by: NURSE ANESTHETIST, CERTIFIED REGISTERED

## 2018-06-13 PROCEDURE — 74011636637 HC RX REV CODE- 636/637: Performed by: INTERNAL MEDICINE

## 2018-06-13 PROCEDURE — 72020 X-RAY EXAM OF SPINE 1 VIEW: CPT

## 2018-06-13 PROCEDURE — 77030005521 HC CATH URETH FOL38 BARD -B: Performed by: SPECIALIST

## 2018-06-13 PROCEDURE — 77030022295 HC SEAL BPLR VSL DISP MEDT -F: Performed by: SPECIALIST

## 2018-06-13 PROCEDURE — 77030018842 HC SOL IRR SOD CL 9% BAXT -A: Performed by: SPECIALIST

## 2018-06-13 PROCEDURE — 77030010507 HC ADH SKN DERMBND J&J -B: Performed by: SPECIALIST

## 2018-06-13 PROCEDURE — 77030012406 HC DRN WND PENRS BARD -A: Performed by: SPECIALIST

## 2018-06-13 PROCEDURE — 77030020782 HC GWN BAIR PAWS FLX 3M -B: Performed by: SPECIALIST

## 2018-06-13 PROCEDURE — 76060000039 HC ANESTHESIA 4 TO 4.5 HR: Performed by: SPECIALIST

## 2018-06-13 PROCEDURE — 82962 GLUCOSE BLOOD TEST: CPT

## 2018-06-13 PROCEDURE — 77030003029 HC SUT VCRL J&J -B: Performed by: SPECIALIST

## 2018-06-13 PROCEDURE — 77030018673: Performed by: SPECIALIST

## 2018-06-13 PROCEDURE — 77030029251 HC SPCR SPN GLMB -K1: Performed by: SPECIALIST

## 2018-06-13 PROCEDURE — 77030003666 HC NDL SPINAL BD -A: Performed by: SPECIALIST

## 2018-06-13 PROCEDURE — 74011250636 HC RX REV CODE- 250/636: Performed by: SPECIALIST

## 2018-06-13 PROCEDURE — 97161 PT EVAL LOW COMPLEX 20 MIN: CPT

## 2018-06-13 PROCEDURE — 74011250637 HC RX REV CODE- 250/637: Performed by: SPECIALIST

## 2018-06-13 PROCEDURE — 74011250636 HC RX REV CODE- 250/636

## 2018-06-13 PROCEDURE — 74011000250 HC RX REV CODE- 250

## 2018-06-13 PROCEDURE — 77030018836 HC SOL IRR NACL ICUM -A: Performed by: SPECIALIST

## 2018-06-13 PROCEDURE — 76010000135 HC OR TIME 4 TO 4.5 HR: Performed by: SPECIALIST

## 2018-06-13 PROCEDURE — C1713 ANCHOR/SCREW BN/BN,TIS/BN: HCPCS | Performed by: SPECIALIST

## 2018-06-13 PROCEDURE — 74011000250 HC RX REV CODE- 250: Performed by: SPECIALIST

## 2018-06-13 PROCEDURE — 65270000029 HC RM PRIVATE

## 2018-06-13 PROCEDURE — 77030004391 HC BUR FLUT MEDT -C: Performed by: SPECIALIST

## 2018-06-13 PROCEDURE — 77030002916 HC SUT ETHLN J&J -A: Performed by: SPECIALIST

## 2018-06-13 PROCEDURE — 0SG10AJ FUSION OF 2 OR MORE LUMBAR VERTEBRAL JOINTS WITH INTERBODY FUSION DEVICE, POSTERIOR APPROACH, ANTERIOR COLUMN, OPEN APPROACH: ICD-10-PCS | Performed by: SPECIALIST

## 2018-06-13 PROCEDURE — 77030008477 HC STYL SATN SLP COVD -A: Performed by: NURSE ANESTHETIST, CERTIFIED REGISTERED

## 2018-06-13 PROCEDURE — 77030013079 HC BLNKT BAIR HGGR 3M -A: Performed by: NURSE ANESTHETIST, CERTIFIED REGISTERED

## 2018-06-13 PROCEDURE — 01NB0ZZ RELEASE LUMBAR NERVE, OPEN APPROACH: ICD-10-PCS | Performed by: SPECIALIST

## 2018-06-13 PROCEDURE — 77030002933 HC SUT MCRYL J&J -A: Performed by: SPECIALIST

## 2018-06-13 PROCEDURE — P9045 ALBUMIN (HUMAN), 5%, 250 ML: HCPCS

## 2018-06-13 PROCEDURE — 74011636637 HC RX REV CODE- 636/637: Performed by: SPECIALIST

## 2018-06-13 PROCEDURE — 77030025167 HC ELECTRD VES SEAL 1 MEDT -F: Performed by: SPECIALIST

## 2018-06-13 PROCEDURE — 77030020558 HC CLMP SPN BEAC GLBM -F: Performed by: SPECIALIST

## 2018-06-13 PROCEDURE — 77030013708 HC HNDPC SUC IRR PULS STRY –B: Performed by: SPECIALIST

## 2018-06-13 PROCEDURE — 77030011640 HC PAD GRND REM COVD -A: Performed by: SPECIALIST

## 2018-06-13 PROCEDURE — 77030006643: Performed by: NURSE ANESTHETIST, CERTIFIED REGISTERED

## 2018-06-13 PROCEDURE — 97116 GAIT TRAINING THERAPY: CPT

## 2018-06-13 PROCEDURE — 77030013735 HC IRR SUC BARD -B: Performed by: SPECIALIST

## 2018-06-13 PROCEDURE — 76210000017 HC OR PH I REC 1.5 TO 2 HR: Performed by: SPECIALIST

## 2018-06-13 DEVICE — BEACON 6.5MM POSTED SCREW, 45MM
Type: IMPLANTABLE DEVICE | Site: SPINE LUMBAR | Status: FUNCTIONAL
Brand: BEACON

## 2018-06-13 DEVICE — CALIBER SPACER 10 X 26MM, 9-13MM
Type: IMPLANTABLE DEVICE | Site: SPINE LUMBAR | Status: FUNCTIONAL
Brand: CALIBER

## 2018-06-13 DEVICE — BEACON 6.35MM STRAIGHT CLAMP, MEDIUM
Type: IMPLANTABLE DEVICE | Site: SPINE LUMBAR | Status: FUNCTIONAL
Brand: BEACON

## 2018-06-13 DEVICE — BEACON 6.5MM POSTED SCREW, 55MM
Type: IMPLANTABLE DEVICE | Site: SPINE LUMBAR | Status: FUNCTIONAL
Brand: BEACON

## 2018-06-13 DEVICE — 6.35MM CURVED ROD, 80MM
Type: IMPLANTABLE DEVICE | Site: SPINE LUMBAR | Status: FUNCTIONAL
Brand: BEACON

## 2018-06-13 DEVICE — BEACON 6.35MM STRAIGHT CLAMP, LARGE
Type: IMPLANTABLE DEVICE | Site: SPINE LUMBAR | Status: FUNCTIONAL
Brand: BEACON

## 2018-06-13 RX ORDER — FENTANYL CITRATE 50 UG/ML
INJECTION, SOLUTION INTRAMUSCULAR; INTRAVENOUS AS NEEDED
Status: DISCONTINUED | OUTPATIENT
Start: 2018-06-13 | End: 2018-06-13 | Stop reason: HOSPADM

## 2018-06-13 RX ORDER — LISINOPRIL 20 MG/1
20 TABLET ORAL
Status: DISCONTINUED | OUTPATIENT
Start: 2018-06-13 | End: 2018-06-17 | Stop reason: HOSPADM

## 2018-06-13 RX ORDER — SODIUM CHLORIDE 0.9 % (FLUSH) 0.9 %
5-10 SYRINGE (ML) INJECTION AS NEEDED
Status: DISCONTINUED | OUTPATIENT
Start: 2018-06-13 | End: 2018-06-17 | Stop reason: HOSPADM

## 2018-06-13 RX ORDER — GABAPENTIN 300 MG/1
600 CAPSULE ORAL ONCE
Status: COMPLETED | OUTPATIENT
Start: 2018-06-13 | End: 2018-06-13

## 2018-06-13 RX ORDER — CEFAZOLIN SODIUM/WATER 2 G/20 ML
2 SYRINGE (ML) INTRAVENOUS ONCE
Status: COMPLETED | OUTPATIENT
Start: 2018-06-13 | End: 2018-06-13

## 2018-06-13 RX ORDER — HYDROMORPHONE HYDROCHLORIDE 2 MG/ML
0.2 INJECTION, SOLUTION INTRAMUSCULAR; INTRAVENOUS; SUBCUTANEOUS
Status: DISCONTINUED | OUTPATIENT
Start: 2018-06-13 | End: 2018-06-13 | Stop reason: HOSPADM

## 2018-06-13 RX ORDER — ONDANSETRON 2 MG/ML
4 INJECTION INTRAMUSCULAR; INTRAVENOUS
Status: DISCONTINUED | OUTPATIENT
Start: 2018-06-13 | End: 2018-06-17 | Stop reason: HOSPADM

## 2018-06-13 RX ORDER — DEXAMETHASONE SODIUM PHOSPHATE 4 MG/ML
INJECTION, SOLUTION INTRA-ARTICULAR; INTRALESIONAL; INTRAMUSCULAR; INTRAVENOUS; SOFT TISSUE AS NEEDED
Status: DISCONTINUED | OUTPATIENT
Start: 2018-06-13 | End: 2018-06-13 | Stop reason: HOSPADM

## 2018-06-13 RX ORDER — INSULIN GLARGINE 100 [IU]/ML
15 INJECTION, SOLUTION SUBCUTANEOUS
Status: DISCONTINUED | OUTPATIENT
Start: 2018-06-13 | End: 2018-06-17 | Stop reason: HOSPADM

## 2018-06-13 RX ORDER — ALBUMIN HUMAN 50 G/1000ML
SOLUTION INTRAVENOUS AS NEEDED
Status: DISCONTINUED | OUTPATIENT
Start: 2018-06-13 | End: 2018-06-13 | Stop reason: HOSPADM

## 2018-06-13 RX ORDER — ROCURONIUM BROMIDE 10 MG/ML
INJECTION, SOLUTION INTRAVENOUS AS NEEDED
Status: DISCONTINUED | OUTPATIENT
Start: 2018-06-13 | End: 2018-06-13 | Stop reason: HOSPADM

## 2018-06-13 RX ORDER — DEXTROSE 50 % IN WATER (D50W) INTRAVENOUS SYRINGE
25-50 AS NEEDED
Status: DISCONTINUED | OUTPATIENT
Start: 2018-06-13 | End: 2018-06-13 | Stop reason: HOSPADM

## 2018-06-13 RX ORDER — CEFAZOLIN SODIUM/WATER 2 G/20 ML
2 SYRINGE (ML) INTRAVENOUS EVERY 8 HOURS
Status: COMPLETED | OUTPATIENT
Start: 2018-06-13 | End: 2018-06-14

## 2018-06-13 RX ORDER — SODIUM CHLORIDE 9 MG/ML
75 INJECTION, SOLUTION INTRAVENOUS CONTINUOUS
Status: DISCONTINUED | OUTPATIENT
Start: 2018-06-13 | End: 2018-06-14

## 2018-06-13 RX ORDER — MAGNESIUM SULFATE 100 %
4 CRYSTALS MISCELLANEOUS AS NEEDED
Status: DISCONTINUED | OUTPATIENT
Start: 2018-06-13 | End: 2018-06-17 | Stop reason: HOSPADM

## 2018-06-13 RX ORDER — MIDAZOLAM HYDROCHLORIDE 1 MG/ML
INJECTION, SOLUTION INTRAMUSCULAR; INTRAVENOUS AS NEEDED
Status: DISCONTINUED | OUTPATIENT
Start: 2018-06-13 | End: 2018-06-13 | Stop reason: HOSPADM

## 2018-06-13 RX ORDER — PHENYLEPHRINE HYDROCHLORIDE 10 MG/ML
INJECTION INTRAVENOUS AS NEEDED
Status: DISCONTINUED | OUTPATIENT
Start: 2018-06-13 | End: 2018-06-13 | Stop reason: HOSPADM

## 2018-06-13 RX ORDER — FENTANYL CITRATE 50 UG/ML
50 INJECTION, SOLUTION INTRAMUSCULAR; INTRAVENOUS
Status: DISCONTINUED | OUTPATIENT
Start: 2018-06-13 | End: 2018-06-13 | Stop reason: HOSPADM

## 2018-06-13 RX ORDER — OXYCODONE AND ACETAMINOPHEN 5; 325 MG/1; MG/1
1 TABLET ORAL
Status: DISCONTINUED | OUTPATIENT
Start: 2018-06-13 | End: 2018-06-15

## 2018-06-13 RX ORDER — METFORMIN HYDROCHLORIDE 500 MG/1
1000 TABLET ORAL 2 TIMES DAILY WITH MEALS
Status: DISCONTINUED | OUTPATIENT
Start: 2018-06-13 | End: 2018-06-13

## 2018-06-13 RX ORDER — LIDOCAINE HYDROCHLORIDE 20 MG/ML
INJECTION, SOLUTION EPIDURAL; INFILTRATION; INTRACAUDAL; PERINEURAL AS NEEDED
Status: DISCONTINUED | OUTPATIENT
Start: 2018-06-13 | End: 2018-06-13 | Stop reason: HOSPADM

## 2018-06-13 RX ORDER — SODIUM CHLORIDE 0.9 % (FLUSH) 0.9 %
5-10 SYRINGE (ML) INJECTION EVERY 8 HOURS
Status: DISCONTINUED | OUTPATIENT
Start: 2018-06-13 | End: 2018-06-17 | Stop reason: HOSPADM

## 2018-06-13 RX ORDER — HYDROMORPHONE HYDROCHLORIDE 2 MG/1
2 TABLET ORAL ONCE
Status: COMPLETED | OUTPATIENT
Start: 2018-06-13 | End: 2018-06-13

## 2018-06-13 RX ORDER — INSULIN LISPRO 100 [IU]/ML
INJECTION, SOLUTION INTRAVENOUS; SUBCUTANEOUS
Status: DISCONTINUED | OUTPATIENT
Start: 2018-06-13 | End: 2018-06-13

## 2018-06-13 RX ORDER — SODIUM CHLORIDE 9 MG/ML
125 INJECTION, SOLUTION INTRAVENOUS CONTINUOUS
Status: DISCONTINUED | OUTPATIENT
Start: 2018-06-13 | End: 2018-06-13 | Stop reason: HOSPADM

## 2018-06-13 RX ORDER — MORPHINE SULFATE 4 MG/ML
2 INJECTION INTRAVENOUS
Status: DISCONTINUED | OUTPATIENT
Start: 2018-06-13 | End: 2018-06-13 | Stop reason: HOSPADM

## 2018-06-13 RX ORDER — FENTANYL CITRATE 50 UG/ML
25 INJECTION, SOLUTION INTRAMUSCULAR; INTRAVENOUS AS NEEDED
Status: DISCONTINUED | OUTPATIENT
Start: 2018-06-13 | End: 2018-06-13 | Stop reason: HOSPADM

## 2018-06-13 RX ORDER — DEXAMETHASONE SODIUM PHOSPHATE 4 MG/ML
4 INJECTION, SOLUTION INTRA-ARTICULAR; INTRALESIONAL; INTRAMUSCULAR; INTRAVENOUS; SOFT TISSUE EVERY 8 HOURS
Status: COMPLETED | OUTPATIENT
Start: 2018-06-13 | End: 2018-06-14

## 2018-06-13 RX ORDER — NEOSTIGMINE METHYLSULFATE 5 MG/5 ML
SYRINGE (ML) INTRAVENOUS AS NEEDED
Status: DISCONTINUED | OUTPATIENT
Start: 2018-06-13 | End: 2018-06-13 | Stop reason: HOSPADM

## 2018-06-13 RX ORDER — SIMVASTATIN 20 MG/1
20 TABLET, FILM COATED ORAL
Status: DISCONTINUED | OUTPATIENT
Start: 2018-06-13 | End: 2018-06-17 | Stop reason: HOSPADM

## 2018-06-13 RX ORDER — PROPOFOL 10 MG/ML
INJECTION, EMULSION INTRAVENOUS AS NEEDED
Status: DISCONTINUED | OUTPATIENT
Start: 2018-06-13 | End: 2018-06-13 | Stop reason: HOSPADM

## 2018-06-13 RX ORDER — MAGNESIUM SULFATE 100 %
4 CRYSTALS MISCELLANEOUS AS NEEDED
Status: DISCONTINUED | OUTPATIENT
Start: 2018-06-13 | End: 2018-06-13 | Stop reason: HOSPADM

## 2018-06-13 RX ORDER — HYDROMORPHONE HYDROCHLORIDE 2 MG/ML
0.5 INJECTION, SOLUTION INTRAMUSCULAR; INTRAVENOUS; SUBCUTANEOUS
Status: DISCONTINUED | OUTPATIENT
Start: 2018-06-13 | End: 2018-06-13 | Stop reason: HOSPADM

## 2018-06-13 RX ORDER — DEXAMETHASONE SODIUM PHOSPHATE 4 MG/ML
4 INJECTION, SOLUTION INTRA-ARTICULAR; INTRALESIONAL; INTRAMUSCULAR; INTRAVENOUS; SOFT TISSUE ONCE
Status: COMPLETED | OUTPATIENT
Start: 2018-06-13 | End: 2018-06-13

## 2018-06-13 RX ORDER — ACETAMINOPHEN 500 MG
1000 TABLET ORAL ONCE
Status: COMPLETED | OUTPATIENT
Start: 2018-06-13 | End: 2018-06-13

## 2018-06-13 RX ORDER — ONDANSETRON 2 MG/ML
4 INJECTION INTRAMUSCULAR; INTRAVENOUS ONCE
Status: DISCONTINUED | OUTPATIENT
Start: 2018-06-13 | End: 2018-06-13 | Stop reason: HOSPADM

## 2018-06-13 RX ORDER — DOCUSATE SODIUM 100 MG/1
100 CAPSULE, LIQUID FILLED ORAL 2 TIMES DAILY
Status: DISCONTINUED | OUTPATIENT
Start: 2018-06-13 | End: 2018-06-17 | Stop reason: HOSPADM

## 2018-06-13 RX ORDER — MORPHINE SULFATE 4 MG/ML
1 INJECTION INTRAVENOUS
Status: DISCONTINUED | OUTPATIENT
Start: 2018-06-13 | End: 2018-06-13 | Stop reason: HOSPADM

## 2018-06-13 RX ORDER — GLYCOPYRROLATE 0.2 MG/ML
INJECTION INTRAMUSCULAR; INTRAVENOUS AS NEEDED
Status: DISCONTINUED | OUTPATIENT
Start: 2018-06-13 | End: 2018-06-13 | Stop reason: HOSPADM

## 2018-06-13 RX ORDER — EPHEDRINE SULFATE/0.9% NACL/PF 25 MG/5 ML
SYRINGE (ML) INTRAVENOUS AS NEEDED
Status: DISCONTINUED | OUTPATIENT
Start: 2018-06-13 | End: 2018-06-13 | Stop reason: HOSPADM

## 2018-06-13 RX ORDER — NALOXONE HYDROCHLORIDE 0.4 MG/ML
0.4 INJECTION, SOLUTION INTRAMUSCULAR; INTRAVENOUS; SUBCUTANEOUS AS NEEDED
Status: DISCONTINUED | OUTPATIENT
Start: 2018-06-13 | End: 2018-06-17 | Stop reason: HOSPADM

## 2018-06-13 RX ORDER — DEXTROSE 50 % IN WATER (D50W) INTRAVENOUS SYRINGE
25 AS NEEDED
Status: DISCONTINUED | OUTPATIENT
Start: 2018-06-13 | End: 2018-06-13 | Stop reason: HOSPADM

## 2018-06-13 RX ORDER — INSULIN LISPRO 100 [IU]/ML
INJECTION, SOLUTION INTRAVENOUS; SUBCUTANEOUS ONCE
Status: COMPLETED | OUTPATIENT
Start: 2018-06-13 | End: 2018-06-13

## 2018-06-13 RX ORDER — DIPHENHYDRAMINE HCL 25 MG
25 CAPSULE ORAL
Status: DISCONTINUED | OUTPATIENT
Start: 2018-06-13 | End: 2018-06-17 | Stop reason: HOSPADM

## 2018-06-13 RX ORDER — INSULIN LISPRO 100 [IU]/ML
4 INJECTION, SOLUTION INTRAVENOUS; SUBCUTANEOUS ONCE
Status: COMPLETED | OUTPATIENT
Start: 2018-06-13 | End: 2018-06-13

## 2018-06-13 RX ORDER — NALOXONE HYDROCHLORIDE 0.4 MG/ML
0.1 INJECTION, SOLUTION INTRAMUSCULAR; INTRAVENOUS; SUBCUTANEOUS AS NEEDED
Status: DISCONTINUED | OUTPATIENT
Start: 2018-06-13 | End: 2018-06-13 | Stop reason: HOSPADM

## 2018-06-13 RX ORDER — SODIUM CHLORIDE 0.9 % (FLUSH) 0.9 %
5-10 SYRINGE (ML) INJECTION AS NEEDED
Status: DISCONTINUED | OUTPATIENT
Start: 2018-06-13 | End: 2018-06-13 | Stop reason: HOSPADM

## 2018-06-13 RX ORDER — MAGNESIUM SULFATE 100 %
16 CRYSTALS MISCELLANEOUS AS NEEDED
Status: DISCONTINUED | OUTPATIENT
Start: 2018-06-13 | End: 2018-06-13 | Stop reason: HOSPADM

## 2018-06-13 RX ORDER — SODIUM CHLORIDE, SODIUM LACTATE, POTASSIUM CHLORIDE, CALCIUM CHLORIDE 600; 310; 30; 20 MG/100ML; MG/100ML; MG/100ML; MG/100ML
125 INJECTION, SOLUTION INTRAVENOUS CONTINUOUS
Status: DISCONTINUED | OUTPATIENT
Start: 2018-06-13 | End: 2018-06-14

## 2018-06-13 RX ORDER — INSULIN LISPRO 100 [IU]/ML
INJECTION, SOLUTION INTRAVENOUS; SUBCUTANEOUS
Status: DISCONTINUED | OUTPATIENT
Start: 2018-06-13 | End: 2018-06-17 | Stop reason: HOSPADM

## 2018-06-13 RX ORDER — DEXTROSE 50 % IN WATER (D50W) INTRAVENOUS SYRINGE
25-50 AS NEEDED
Status: DISCONTINUED | OUTPATIENT
Start: 2018-06-13 | End: 2018-06-17 | Stop reason: HOSPADM

## 2018-06-13 RX ADMIN — ALBUMIN HUMAN 250 ML: 50 SOLUTION INTRAVENOUS at 09:40

## 2018-06-13 RX ADMIN — ROCURONIUM BROMIDE 10 MG: 10 INJECTION, SOLUTION INTRAVENOUS at 10:15

## 2018-06-13 RX ADMIN — FENTANYL CITRATE 25 MCG: 50 INJECTION, SOLUTION INTRAMUSCULAR; INTRAVENOUS at 09:31

## 2018-06-13 RX ADMIN — DOCUSATE SODIUM 100 MG: 100 CAPSULE, LIQUID FILLED ORAL at 22:15

## 2018-06-13 RX ADMIN — SODIUM CHLORIDE 75 ML/HR: 900 INJECTION, SOLUTION INTRAVENOUS at 17:40

## 2018-06-13 RX ADMIN — FENTANYL CITRATE 50 MCG: 50 INJECTION, SOLUTION INTRAMUSCULAR; INTRAVENOUS at 12:45

## 2018-06-13 RX ADMIN — PROPOFOL 200 MG: 10 INJECTION, EMULSION INTRAVENOUS at 09:05

## 2018-06-13 RX ADMIN — SIMVASTATIN 20 MG: 20 TABLET, FILM COATED ORAL at 22:16

## 2018-06-13 RX ADMIN — LIDOCAINE HYDROCHLORIDE 80 MG: 20 INJECTION, SOLUTION EPIDURAL; INFILTRATION; INTRACAUDAL; PERINEURAL at 09:05

## 2018-06-13 RX ADMIN — INSULIN LISPRO 6 UNITS: 100 INJECTION, SOLUTION INTRAVENOUS; SUBCUTANEOUS at 18:40

## 2018-06-13 RX ADMIN — SODIUM CHLORIDE, SODIUM LACTATE, POTASSIUM CHLORIDE, AND CALCIUM CHLORIDE: 600; 310; 30; 20 INJECTION, SOLUTION INTRAVENOUS at 10:00

## 2018-06-13 RX ADMIN — ROCURONIUM BROMIDE 10 MG: 10 INJECTION, SOLUTION INTRAVENOUS at 12:04

## 2018-06-13 RX ADMIN — Medication 2 G: at 16:55

## 2018-06-13 RX ADMIN — PHENYLEPHRINE HYDROCHLORIDE 100 MCG: 10 INJECTION INTRAVENOUS at 10:15

## 2018-06-13 RX ADMIN — ROCURONIUM BROMIDE 10 MG: 10 INJECTION, SOLUTION INTRAVENOUS at 11:26

## 2018-06-13 RX ADMIN — FENTANYL CITRATE 100 MCG: 50 INJECTION, SOLUTION INTRAMUSCULAR; INTRAVENOUS at 09:00

## 2018-06-13 RX ADMIN — INSULIN GLARGINE 15 UNITS: 100 INJECTION, SOLUTION SUBCUTANEOUS at 22:16

## 2018-06-13 RX ADMIN — ACETAMINOPHEN 1000 MG: 500 TABLET, FILM COATED ORAL at 06:38

## 2018-06-13 RX ADMIN — ROCURONIUM BROMIDE 10 MG: 10 INJECTION, SOLUTION INTRAVENOUS at 10:57

## 2018-06-13 RX ADMIN — HYDROMORPHONE HYDROCHLORIDE 2 MG: 2 TABLET ORAL at 07:22

## 2018-06-13 RX ADMIN — MIDAZOLAM HYDROCHLORIDE 2 MG: 1 INJECTION, SOLUTION INTRAMUSCULAR; INTRAVENOUS at 08:49

## 2018-06-13 RX ADMIN — ROCURONIUM BROMIDE 10 MG: 10 INJECTION, SOLUTION INTRAVENOUS at 09:23

## 2018-06-13 RX ADMIN — PHENYLEPHRINE HYDROCHLORIDE 100 MCG: 10 INJECTION INTRAVENOUS at 09:41

## 2018-06-13 RX ADMIN — Medication 5 MG: at 11:00

## 2018-06-13 RX ADMIN — Medication 3 MG: at 12:45

## 2018-06-13 RX ADMIN — Medication 5 MG: at 11:39

## 2018-06-13 RX ADMIN — Medication 5 MG: at 11:21

## 2018-06-13 RX ADMIN — SODIUM CHLORIDE, SODIUM LACTATE, POTASSIUM CHLORIDE, AND CALCIUM CHLORIDE 125 ML/HR: 600; 310; 30; 20 INJECTION, SOLUTION INTRAVENOUS at 14:05

## 2018-06-13 RX ADMIN — ONDANSETRON 4 MG: 2 INJECTION INTRAMUSCULAR; INTRAVENOUS at 16:18

## 2018-06-13 RX ADMIN — HYDROMORPHONE HYDROCHLORIDE: 10 INJECTION, SOLUTION INTRAMUSCULAR; INTRAVENOUS; SUBCUTANEOUS at 13:52

## 2018-06-13 RX ADMIN — GLYCOPYRROLATE 0.6 MG: 0.2 INJECTION INTRAMUSCULAR; INTRAVENOUS at 12:45

## 2018-06-13 RX ADMIN — FENTANYL CITRATE 25 MCG: 50 INJECTION, SOLUTION INTRAMUSCULAR; INTRAVENOUS at 11:42

## 2018-06-13 RX ADMIN — DEXAMETHASONE SODIUM PHOSPHATE 4 MG: 4 INJECTION, SOLUTION INTRAMUSCULAR; INTRAVENOUS at 16:55

## 2018-06-13 RX ADMIN — PHENYLEPHRINE HYDROCHLORIDE 100 MCG: 10 INJECTION INTRAVENOUS at 09:46

## 2018-06-13 RX ADMIN — INSULIN LISPRO 3 UNITS: 100 INJECTION, SOLUTION INTRAVENOUS; SUBCUTANEOUS at 06:31

## 2018-06-13 RX ADMIN — INSULIN LISPRO 6 UNITS: 100 INJECTION, SOLUTION INTRAVENOUS; SUBCUTANEOUS at 22:17

## 2018-06-13 RX ADMIN — INSULIN LISPRO 4 UNITS: 100 INJECTION, SOLUTION INTRAVENOUS; SUBCUTANEOUS at 13:47

## 2018-06-13 RX ADMIN — LISINOPRIL 20 MG: 20 TABLET ORAL at 22:16

## 2018-06-13 RX ADMIN — PHENYLEPHRINE HYDROCHLORIDE 100 MCG: 10 INJECTION INTRAVENOUS at 09:28

## 2018-06-13 RX ADMIN — ROCURONIUM BROMIDE 50 MG: 10 INJECTION, SOLUTION INTRAVENOUS at 09:06

## 2018-06-13 RX ADMIN — GABAPENTIN 600 MG: 300 CAPSULE ORAL at 07:22

## 2018-06-13 RX ADMIN — DEXAMETHASONE SODIUM PHOSPHATE 4 MG: 4 INJECTION, SOLUTION INTRA-ARTICULAR; INTRALESIONAL; INTRAMUSCULAR; INTRAVENOUS; SOFT TISSUE at 09:30

## 2018-06-13 RX ADMIN — SODIUM CHLORIDE, SODIUM LACTATE, POTASSIUM CHLORIDE, AND CALCIUM CHLORIDE 125 ML/HR: 600; 310; 30; 20 INJECTION, SOLUTION INTRAVENOUS at 06:49

## 2018-06-13 RX ADMIN — PHENYLEPHRINE HYDROCHLORIDE 100 MCG: 10 INJECTION INTRAVENOUS at 10:03

## 2018-06-13 RX ADMIN — DEXAMETHASONE SODIUM PHOSPHATE 4 MG: 4 INJECTION, SOLUTION INTRAMUSCULAR; INTRAVENOUS at 06:49

## 2018-06-13 RX ADMIN — ROCURONIUM BROMIDE 10 MG: 10 INJECTION, SOLUTION INTRAVENOUS at 09:58

## 2018-06-13 RX ADMIN — ROCURONIUM BROMIDE 10 MG: 10 INJECTION, SOLUTION INTRAVENOUS at 11:43

## 2018-06-13 RX ADMIN — Medication 2 G: at 09:14

## 2018-06-13 RX ADMIN — ROCURONIUM BROMIDE 10 MG: 10 INJECTION, SOLUTION INTRAVENOUS at 09:41

## 2018-06-13 RX ADMIN — SODIUM CHLORIDE, SODIUM LACTATE, POTASSIUM CHLORIDE, AND CALCIUM CHLORIDE: 600; 310; 30; 20 INJECTION, SOLUTION INTRAVENOUS at 08:50

## 2018-06-13 NOTE — PERIOP NOTES
Bedside hand off with Deila Granados RN and dual skin check. Patient states personal belongings are with his family member.

## 2018-06-13 NOTE — PROGRESS NOTES
Problem: Mobility Impaired (Adult and Pediatric)  Goal: *Acute Goals and Plan of Care (Insert Text)  Goals to be addressed in 1-4 days:  STG  1. Rolling L to R to L modified independent for positioning. 2. Supine to sit to supine S with HR for meals. 3. Sit to stand to sit S with RW in prep for ambulation. LTG  1. Ambulate >150ft S with RW, WBAT, for home/community mobility. 2. Ascend/descend a >4 stair steps CGA with HR for home entry. 3. Tolerate up in chair 1-2 hours for ADLs. 4. Patient/family to be independent with HEP for follow-up care and safe discharge. Outcome: Progressing Towards Goal  physical Therapy EVALUATION    Patient: Louie Ngo (65 y.o. male)  Date: 6/13/2018  Primary Diagnosis: LUMBAR STENOSIS  Lumbar stenosis with neurogenic claudication  Procedure(s) (LRB):  DECOMPRESSIVE LUMBAR LAMINECTOMY (L4-L5-S1),(ALMARAZ PROCEDURE AT L5-S1) AUTOLOGOUS BONE, TLIF CAGE AND PEDICLE SCREWS WITH C-ARM,  (N/A) Day of Surgery   Precautions:   Fall, Back, Spinal    ASSESSMENT :  Based on the objective data described below, the patient presents with lower extremity weakness, decreased gait quality and endurance, impaired bed mobility and transfers, and overall limitations in functional mobility s/p lumbar surgery noted above. Pt performed supine to sit with Jose. LSO donned. Sit to stand with Min-ModA. Patient ambulated 3 side steps with RW, GB applied, CGA; gait duration limited by nausea and vomitting, dizziness. SPO2 99% on RA. Patient would benefit from skilled inpatient physical therapy to address deficits, progress as tolerated to achieve long term goals and allow safe discharge. Pt needs RW for home use. Patient will benefit from skilled intervention to address the above impairments.   Patients rehabilitation potential is considered to be Good  Factors which may influence rehabilitation potential include:   []         None noted  []         Mental ability/status  [x]         Medical condition  []         Home/family situation and support systems  []         Safety awareness  [x]         Pain tolerance/management  []         Other:      PLAN :  Recommendations and Planned Interventions:  [x]           Bed Mobility Training             [x]    Neuromuscular Re-Education  [x]           Transfer Training                   []    Orthotic/Prosthetic Training  [x]           Gait Training                          []    Modalities  [x]           Therapeutic Exercises          []    Edema Management/Control  [x]           Therapeutic Activities            [x]    Patient and Family Training/Education  []           Other (comment):    Frequency/Duration: Patient will be followed by physical therapy twice daily to address goals. Discharge Recommendations: Home Health  Further Equipment Recommendations for Discharge: rolling walker     SUBJECTIVE:   Patient stated That wasn't that bad.     OBJECTIVE DATA SUMMARY:     Past Medical History:   Diagnosis Date    Adverse effect of anesthesia 2018    hoarseness  after recent ACDF surgery, some difficulty swallowing    Arthritis     Cancer ([facility] [address]     Basal cell skin cancers    Chronic low back pain     Diabetic neuropathy (HCC)     DM (diabetes mellitus) ([facility] [address]     GERD (gastroesophageal reflux disease)     Hematuria     HTN (hypertension)     Hyperlipidemia     Pyogenic granuloma of skin and subcutaneous tissue     Thyroid disease 1979    Varicella      Past Surgical History:   Procedure Laterality Date    HX APPENDECTOMY      HX BACK SURGERY  2018    cervical    HX ENDOSCOPY      HX HEENT      partial thyroidectomy    HX UROLOGICAL  2010's    Cystoscopy, ureteroscopy with stent placement    NEUROLOGICAL PROCEDURE UNLISTED      Epidural steroid injections     Barriers to Learning/Limitations: None  Compensate with: N/A  Prior Level of Function/Home Situation: Independent amb s/AD  Home Situation  Home Environment: Private residence  # Steps to Enter: 4  Rails to [employer]: Yes  One/Two Story Residence: One story  Living Alone: No  Support Systems: Spouse/Significant Other/Partner, Family member(s)  Patient Expects to be Discharged to[de-identified] Private residence  Current DME Used/Available at Home: None  Critical Behavior:  Neurologic State: Alert  Psychosocial  Purposeful Interaction: Yes  Pt Identified Daily Priority: Clinical issues (comment)  Caring Interventions: Reassure  Reassure: Therapeutic listening; Informing  Skin Condition/Temp: Dry;Warm  Skin Integrity: Incision (comment); Intact  Skin Integumentary  Skin Color: Appropriate for ethnicity  Skin Condition/Temp: Dry;Warm  Skin Integrity: Incision (comment); Intact  Turgor: Non-tenting  Hair Growth: Present  Strength:    Strength: Generally decreased, functional  Tone & Sensation:   Tone: Normal  Sensation: Impaired  Range Of Motion:  AROM: Generally decreased, functional  PROM: Generally decreased, functional  Functional Mobility:  Bed Mobility:  Supine to Sit: Minimum assistance  Sit to Supine: Minimum assistance  Transfers:  Sit to Stand: Minimum assistance; Moderate assistance  Stand to Sit: Minimum assistance  Balance:   Sitting: Intact  Standing: Intact; With support  Ambulation/Gait Training:  Distance (ft): 3 Feet (ft) (side steps)  Assistive Device: Gait belt;Walker, rolling  Ambulation - Level of Assistance: Contact guard assistance  Gait Description (WDL): Exceptions to WDL  Gait Abnormalities: Decreased step clearance  Base of Support: Widened  Stance: Weight shift  Speed/Adriana: Slow  Step Length: Right shortened;Left shortened  Swing Pattern: Left asymmetrical;Right asymmetrical  Pain:  Pain Scale 1: Numeric (0 - 10)  Pain Intensity 1: 6  Pain Location 1: Back  Pain Orientation 1: Lower  Pain Description 1: Aching  Pain Intervention(s) 1: Encouraged PCA  Activity Tolerance:   Good  Please refer to the flowsheet for vital signs taken during this treatment.   After treatment:   []         Patient left in no apparent distress sitting up in chair  [x]         Patient left in no apparent distress in bed  [x]         Call bell left within reach  [x]         Nursing notified  []         Caregiver present  []         Bed alarm activated    COMMUNICATION/EDUCATION:   [x]         Fall prevention education was provided and the patient/caregiver indicated understanding. [x]         Patient/family have participated as able in goal setting and plan of care. [x]         Patient/family agree to work toward stated goals and plan of care. []         Patient understands intent and goals of therapy, but is neutral about his/her participation. []         Patient is unable to participate in goal setting and plan of care. Thank you for this referral.  Daisha Griffith   Time Calculation: 24 mins   Eval Complexity: History: MEDIUM  Complexity : 1-2 comorbidities / personal factors will impact the outcome/ POC Exam:LOW Complexity : 1-2 Standardized tests and measures addressing body structure, function, activity limitation and / or participation in recreation  Presentation: LOW Complexity : Stable, uncomplicated  Clinical Decision Making:Medium Complexity amb <30 ft c/RW, Jose for mobility Overall Complexity:LOW  Mobility  Current  CK= 40-59%   Goal  CI= 1-19%. The severity rating is based on the Level of Assistance required for Functional Mobility and ADLs.

## 2018-06-13 NOTE — DIABETES MGMT
Diabetes Patient/Family Education Record    Factors That  May Influence Patients Ability  to Learn or  Comply with Recommendations   []   Language barrier    []   Cultural needs   [x]   Motivation    []   Cognitive limitation    []   Physical   []   Education    []   Physiological factors   []   Hearing/vision/speaking impairment   []   Zoroastrian beliefs    [x]   Financial factors   []  Other:   []  No factors identified at this time.      Person Instructed:   [x]   Patient   [x]   Family (wife)   []  Other     Preference for Learning:   [x]   Verbal   []   Written   []  Demonstration     Level of Comprehension & Competence:    []  Good                                      [x] Fair                                     []  Poor                             []  Needs Reinforcement   [x]  Teachback completed    Education Component:   [x]  Medication management, including confirmation of home regimen; pt prescribed januvia by OP PCM not taking r/t copay  Provided pt/wife with Januvia co pay card; encouraged to call [employer] to see if another DPP IV has preferred status with lower copay   []  Nutritional management    []  Exercise   []  Signs, symptoms, and treatment of hyperglycemia and hypoglycemia   [] Prevention, recognition and treatment of hyperglycemia and hypoglycemia   [x]  Importance of blood glucose monitoring; pt has a working meter does not SMBG; instructed to SMBG daily prior to breakfast   []  Instruction on use of the blood glucose meter   [x]  Discuss the importance of HbA1C monitoring; educated on goal for age + comorbids   []  Sick day guidelines   []  Proper use and disposal of lancets, needles, syringes or insulin pens (if appropriate)   [x]  Potential short-term complications; impact GC has on postoperative wound healing   [] Information about whom to contact in case of emergency or for more information    [x]  Goal:  Patient/family will demonstrate understanding of Diabetes Self Management Skills by: 7/30/18  Plan for post-discharge education or self-management support:    [x] Outpatient class schedule provided            [] Patient Declined    [] Scheduled for outpatient classes (date) _______       Richi Barrett RN, MS  Glycemic Control Team  Pager [phone] (M-TH 8:30-5P)  *After Hours pager [phone]

## 2018-06-13 NOTE — ROUTINE PROCESS
1529  TRANSFER - IN REPORT:    Verbal report received from JULIO Du RN(name) on Mark Mendes  being received from Tymphany) for routine post - op      Report consisted of patients Situation, Background, Assessment and   Recommendations(SBAR). Information from the following report(s) SBAR, Kardex and MAR was reviewed with the receiving nurse. Opportunity for questions and clarification was provided. Assessment completed upon patients arrival to unit and care assumed.

## 2018-06-13 NOTE — PROGRESS NOTES
[facility] 147 client from PACU in satisfactory condition. Client is a pt of Dr. Jared Horton. Pt had a Decompressive Lumlam L4-5 to S1, TLIF cage and Pedicle screws today. Client is A/O X 4. Client is calm and cooperative. Clients PERRLA. Denies numbness or tingling to any extremity. With + Radial,Posterior Tibial and Dorsalis Pedis pulses. Capillary Refill less than 3 seconds. Skin is warm , dry and skin color is appropriate to race. Client is negative for JVD. Bibasilar breath sounds clear. No use of accessory muscles. Bowel sounds hypoactive to all quadrants. Abdomen is soft and non-tender. Client has pemberton cath drainig clear yellow urine. Client has a ABD and medipore tape dressing to the lower back back. Dressing is dry and intact. No other skin integrity issues present. Sequential compression device applied. Client has LH 18 gauge PIV present and running NSS at 75 ml/hr. Client hasDilaudid PCA withBolus and basal doses. . Clients pain is 6/10 on 0-10 scale. Client oriented to call bell use as well as bed use. Client oriented to phone and how to order meals. Call bell within reach. Bed in low position. Three side rails up. Dual skin check done with Jacinto Roper RN. No skin breakdown noted. 4:08 PM   Dr Monica Blakely was made aware of Hospitalist consult for DM.   4:24 PM   Pt vomited about 50 ml of yellowish fluids. Medicated with Zofran 4 mg IV.     1650  Diabetic educator in to speak to pt. 523 PM  Vomited about 20 ml of yellowish fluids. 5:28 PM   Pt's Accucheck  264. Pt has order for metformin by dr Jared Horton but not seen by dr Mindy Gomez yet. Dr Mindy Gomez is aware. As per Dr Monica Blakely, will come see pt before giving metformin and or humalog ss. 1830  Pt was seen by PT. Pt out of bed and did few side steps in room. Pt vomited  about 100 ml of yellow fluids. [address]   Dr Monica Blakely in to see pt. Metformin was discontinued. Pt given 6 units of Humalog insulin . Pt eating crackers and ginger ale.   Rest of food at bedside for later. Edwar Cleaning

## 2018-06-13 NOTE — OP NOTES
[facility] [place]  OPERATIVE REPORT    Omer Lopez  MR#: [id number]  : 1955  ACCOUNT #: [de-identified]   DATE OF SERVICE: 2018    PREOPERATIVE DIAGNOSES:  Lumbar stenosis L4-5 and L5-S1 with degenerative spondylolisthesis L5-S1 with bilateral neurogenic claudication. POSTOPERATIVE DIAGNOSES:  Lumbar stenosis L4-5 and L5-S1 with degenerative spondylolisthesis L5-S1 with bilateral neurogenic claudication. PROCEDURES PERFORMED:  1. Lumbar total decompressive laminectomy of L4-L5.  2.  Bilateral foraminotomies, medial facetectomy L4-L5 and L5-S1.  3.  Excision of the nucleus pulposus L4-5, right side. 4.  Globus expandable cage with autologous bone inserted L4-5 right side for TLIF.  5.  Pedicle screw fixation L4, L5, S1 bilateral with Globus Peek cage system. 6.  Transverse process fusion L4-5 and L5-S1 bilaterally with autologous bone off microsurgical technique. SURGEON:  Suha Means MD    ASSISTANT:      ANESTHESIA:  General, Alejandra Chairez MD     ESTIMATED BLOOD LOSS:  Approximately 300 mL  Patient transfused 125 mL via the Cell Saver. COUNTS:  Correct. DRAIN:  One. SPECIMENS REMOVED:        HISTORY:  The patient was brought to surgery. Bilateral intractable leg pain, right more than left with severe stenosis L4-5 and L5-S1, degenerative spondylolisthesis L5-S1. He ultimately failed conservative treatment. A very abnormal MRI scan and intractable leg pain, altering quality of life, interfering with his ability to ambulate. Before surgery I had a full discussion with the patient including the operation, indications,  risks, reasonable outcome and expectations to surgery. He desired to proceed because of intractable nature of bilateral leg pain. DESCRIPTION OF PROCEDURE: The patient was placed on the operating table in the prone position after satisfactory induction of general endotracheal anesthesia.   The back was prepped and draped in a sterile field.  The patient was given intravenous antibiotics. Image intensifier was draped in sterile field. X-ray was taken to verify the anatomy. Incision made from L3 to the sacrum. The paraspinal musculature subperiosteally exposed bilaterally with the Cell Saver in the room. With good exposure and confirmation of x-ray using the operating microscope, I performed a decompressive laminectomy of L4 and L5 with a drill and rongeurs. I saved all the autologous bone, had a good deal of it. I performed bilateral medial facetectomy, foraminotomies at L4-L5 and L5-S1. Findings:  Very severe stenosis at both levels bilaterally. L5-S1 was diseased with stenosis. The disk space was collapsed. I did not think I could get in the interspace. I did not attempt to put a cage there. At L4-L5 on the right side, the broad-based herniated disk I extracted the nerve root medially, entered the interspace using straight and angled curettes, straight and angled pituitary rongeurs of various shapes and sizes. I removed a large amount of intervertebral disk. When we finished the nerve root free on both sides of the L4-5 and L5-S1. I sized for the correct size expandable cage. I saved all the autologous bone, run it through the . I had a good deal of bone. I loaded the cage with autologous bone, inserted the cage, good position by x-ray. I then decorticated the transverse process of L4-L5, the facet joints and side of the lamina on both sides. Good bleeding bone all around. With image intensifier, I placed pedicle screws, the pedicle of L4 and L5 bilaterally, Globus Peek cage system. I had good bony purchase with the screws. I touched the screws with cautery. No buttock or leg movement. I attached rods bilateral and the final x-ray looked good. I had already decorticated and was ready for the fusion. I took the remaining bone perfusion.   Prior to putting the bony in I pulsated the wound 1000 mL of irrigant and I removed all the irrigant, had good bleeding bone all around. I placed the autologous bone prepared as above in the gutter on both sides, filled up the gutter from L4 to the sacrum on both sides. Retractors removed. A few muscle bleeders cauterized. The Valsalva confirmed no spinal fluid leak. Retractors were removed. Numerous bleeders cauterized. Good hemostasis achieved. I passed a Hemovac drain through a separate stab wound down the decompressive areas and sutured the drain to skin. The wound was closed with multiple layers of Vicryl in deep and superficial fascia, 3-0 subcuticular suture of Monocryl, Dermabond on the skin. After the wound was closed the drain worked nicely. Standard dressing applied. Anesthetic discontinued. The patient was transferred to PACU having tolerated the procedure very well.       MD Nathanael Kemp / Stormy Tijerina  D: 06/13/2018 13:21     T: 06/13/2018 17:56  JOB #: [id number]

## 2018-06-13 NOTE — IP AVS SNAPSHOT
[address] [address] 
[phone] Patient: Mark Mendes MRN: [id number] COL:9/30/6662 A check michelle indicates which time of day the medication should be taken. My Medications START taking these medications Instructions Each Dose to Equal  
 Morning Noon Evening Bedtime * lactulose 10 gram/15 mL solution Commonly known as:  Loletha Dapper Your last dose was: Your next dose is: Take 15 mL by mouth two (2) times a day for 2 days. 10 g * lactulose 10 gram/15 mL solution Commonly known as:  Loletha Dapper Your last dose was: Your next dose is: Take 45 mL by mouth two (2) times a day. 30 g  
    
   
   
   
  
 lubiPROStone 8 mcg capsule Commonly known as:  Geetha Irwin Your last dose was: Your next dose is: Take 1 Cap by mouth two (2) times daily (with meals). Indications: OPIOID-INDUCED CONSTIPATION  
 8 mcg  
    
   
   
   
  
 morphine CR 60 mg CR tablet Commonly known as:  MS CONTIN Your last dose was: Your next dose is: Take 1 Tab by mouth every twelve (12) hours. Max Daily Amount: 120 mg.  
 60 mg  
    
   
   
   
  
 * Notice: This list has 2 medication(s) that are the same as other medications prescribed for you. Read the directions carefully, and ask your doctor or other care provider to review them with you. CONTINUE taking these medications Instructions Each Dose to Equal  
 Morning Noon Evening Bedtime JANUVIA 50 mg tablet Generic drug:  SITagliptin Your last dose was: Your next dose is: Take 50 mg by mouth daily. 50 mg  
    
   
   
   
  
 lisinopril 20 mg tablet Commonly known as:  Carolina Cantrell Your last dose was: Your next dose is: Take 20 mg by mouth nightly.   
 20 mg  
    
   
   
   
  
 metFORMIN 500 mg tablet Commonly known as:  GLUCOPHAGE Your last dose was: Your next dose is: Take 1,000 mg by mouth two (2) times daily (with meals). 1000 mg  
    
   
   
   
  
 oxyCODONE-acetaminophen 5-325 mg per tablet Commonly known as:  PERCOCET Your last dose was: Your next dose is: Take 1 Tab by mouth every four (4) hours as needed. Max Daily Amount: 6 Tabs. 1 Tab  
    
   
   
   
  
 simvastatin 20 mg tablet Commonly known as:  ZOCOR Your last dose was: Your next dose is: Take  by mouth nightly. STOP taking these medications   
 traMADol 50 mg tablet Commonly known as:  ULTRAM  
   
  
  
  
Where to Get Your Medications These medications were sent to [facility] [address], [address]  [address], [address] [address] [facility] Phone:  [phone]  
  lubiPROStone 8 mcg capsule Information on where to get these meds will be given to you by the nurse or doctor. ! Ask your nurse or doctor about these medications  
  lactulose 10 gram/15 mL solution  
 lactulose 10 gram/15 mL solution  
 morphine CR 60 mg CR tablet  
 oxyCODONE-acetaminophen 5-325 mg per tablet

## 2018-06-13 NOTE — ANESTHESIA POSTPROCEDURE EVALUATION
Post-Anesthesia Evaluation and Assessment    Cardiovascular Function/Vital Signs  Visit Vitals    /73    Pulse 74    Temp 37 °C (98.6 °F)    Resp 13    Ht 6' (1.829 m)    Wt 120.7 kg (266 lb 3 oz)    SpO2 95%    BMI 36.1 kg/m2       Patient is status post Procedure(s):  DECOMPRESSIVE LUMBAR LAMINECTOMY (L4-L5-S1),(ALMARAZ PROCEDURE AT L5-S1) AUTOLOGOUS BONE, TLIF CAGE AND PEDICLE SCREWS WITH C-ARM, . Nausea/Vomiting: Controlled. Postoperative hydration reviewed and adequate. Pain:  Pain Scale 1: FLACC (06/13/18 1420)  Pain Intensity 1: 0 (06/13/18 1420)   Managed. Neurological Status:   Neuro (WDL): Within Defined Limits (06/13/18 1405)   At baseline. Mental Status and Level of Consciousness: Arousable. Pulmonary Status:   O2 Device: Nasal cannula (06/13/18 1405)   Adequate oxygenation and airway patent. Complications related to anesthesia: None    Post-anesthesia assessment completed. No concerns. Patient has met all discharge requirements.     Signed By: Virginia Molina MD    June 13, 2018

## 2018-06-13 NOTE — DIABETES MGMT
NUTRITION / GLYCEMIC CONTROL PLAN OF CARE        Diabetes Management:    -known h/o of T2DM, HbA1C not within recommended range for age + comorbids r/t non adherence to oral home regimen  -pt prescribed Januvia, not sure of dose did not fill prescription r/t high copay  - recommend: hyperglycemia r/t non aderence &  IV steroids initiate basal insulin + mealtime coverage   *Lantus 12 units daily   *Humalog 6 units qac   *- Humalog Normal Insulin Sensitivity Corrective Coverage    - education: (see GC RN note)  - goals:    *BG will be in target range non-ICU: < 140 mg/dL by 6/20   *PO intake will be at least 50% of meals offered by 6/20  - TDD: 7 - Humalog Normal Insulin Sensitivity Corrective Coverage  - BG range: 194-301 mg/dL  - Hypo: no  - BG in target range (non-ICU: <140; ICU<180): [] Yes  [x] No  - Steroids: 4 mg IV q 8 hours      Current Insulin regimen:   - Humalog Normal Insulin Sensitivity Corrective Coverage    Home medication/insulin regimen:  Metformin 1000 mg BID    Recent Glucose Results:   Lab Results   Component Value Date/Time    GLUCPOC 301 (H) 06/13/2018 01:19 PM    GLUCPOC 194 (H) 06/13/2018 06:08 AM       Adequate glycemic control PTA:  [] Yes  [x] No    HbA1c: equivalent  to ave BGlucose of 220 mg/dl for 2-3 months prior to admission    Lab Results   Component Value Date/Time    Hemoglobin A1c 9.3 (H) 06/11/2018 09:13 AM     Diet:   Active Orders   Diet    DIET DIABETIC CONSISTENT CARB Regular       Juanita Saunders RN, MS  Glycemic Control Team  Pager [phone] (M-TH 8:30-5P)  *After Hours pager [phone]

## 2018-06-13 NOTE — ROUTINE PROCESS
1940  Bedside and Verbal shift change report given to Keerthi Conner RN (oncoming nurse) by Keily Sood RN (offgoing nurse). Report included the following information SBAR, Kardex and MAR.

## 2018-06-13 NOTE — ANESTHESIA PREPROCEDURE EVALUATION
Anesthetic History   No history of anesthetic complications     Pertinent negatives: No PONV  Comments: Hoarseness, fairly significant after ACDF     Review of Systems / Medical History  Patient summary reviewed, nursing notes reviewed and pertinent labs reviewed    Pulmonary              Pertinent negatives: No COPD, asthma and smoker     Neuro/Psych   Within defined limits           Cardiovascular    Hypertension: well controlled            Pertinent negatives: No past MI and CAD  Exercise tolerance: >4 METS     GI/Hepatic/Renal     GERD: well controlled        Pertinent negatives: No liver disease and renal disease   Endo/Other    Diabetes: poorly controlled  Hypothyroidism  Arthritis     Other Findings   Comments: etoh -          Physical Exam    Airway  Mallampati: II  TM Distance: 4 - 6 cm  Neck ROM: normal range of motion   Mouth opening: Normal     Cardiovascular  Regular rate and rhythm,  S1 and S2 normal,  no murmur, click, rub, or gallop  Rhythm: regular  Rate: normal         Dental    Dentition: Caps/crowns     Pulmonary  Breath sounds clear to auscultation               Abdominal  GI exam deferred       Other Findings            Anesthetic Plan    ASA: 2  Anesthesia type: general          Induction: Intravenous  Anesthetic plan and risks discussed with: Patient and Spouse      Patient and spouse informed of risk of dental damage periop. They understand and desire to proceed. Discussed risk of Blindness.

## 2018-06-13 NOTE — PERIOP NOTES
TRANSFER - OUT REPORT:    Verbal report given to Liam Miller (name) on Kathy Aguero  being transferred to Room 209 (unit) for routine post - op       Report consisted of patients Situation, Background, Assessment and   Recommendations(SBAR). Information from the following report(s) SBAR, Kardex, Intake/Output, MAR, Recent Results and Cardiac Rhythm NSr was reviewed with the receiving nurse. Lines:   Peripheral IV 06/13/18 Left Hand (Active)   Site Assessment Clean, dry, & intact 6/13/2018  1:14 PM   Phlebitis Assessment 0 6/13/2018  1:14 PM   Infiltration Assessment 0 6/13/2018  1:14 PM   Dressing Status Clean, dry, & intact 6/13/2018  1:14 PM   Dressing Type Transparent 6/13/2018  1:14 PM   Hub Color/Line Status Infusing 6/13/2018  1:14 PM        Opportunity for questions and clarification was provided.       Patient transported with:   O2 @ 2 liters

## 2018-06-13 NOTE — IP AVS SNAPSHOT
[facility] 
 
 
 [address] [facility] [address] 
[phone] Patient: Abraham Ceballos MRN: [id number] About your hospitalization You were admitted on:  June 13, 2018 You last received care in the:  THE [facility] [address] You were discharged on:  June 17, 2018 Why you were hospitalized Your primary diagnosis was:  Not on File Your diagnoses also included:  Lumbar Stenosis With Neurogenic Claudication, Constipated, Insulin Dependent Diabetes Mellitus (Hcc) Follow-up Information Follow up With Details Comments Contact Info Susana Short MD On 7/5/2018 Follow up appointment @ 10:30am [address] [place] [address] 
[phone] [address] [facility] to continue managing your healthcare needs. [phone] MD [address] 
[address] [facility] [address] 
[phone] Discharge Orders None A check michelle indicates which time of day the medication should be taken. My Medications START taking these medications Instructions Each Dose to Equal  
 Morning Noon Evening Bedtime * lactulose 10 gram/15 mL solution Commonly known as:  Cynthia Borders Your last dose was: Your next dose is: Take 15 mL by mouth two (2) times a day for 2 days. 10 g * lactulose 10 gram/15 mL solution Commonly known as:  Cynthia Borders Your last dose was: Your next dose is: Take 45 mL by mouth two (2) times a day. 30 g  
    
   
   
   
  
 lubiPROStone 8 mcg capsule Commonly known as:  Mercy Nab Your last dose was: Your next dose is: Take 1 Cap by mouth two (2) times daily (with meals). Indications: OPIOID-INDUCED CONSTIPATION  
 8 mcg  
    
   
   
   
  
 morphine CR 60 mg CR tablet Commonly known as:  MS CONTIN  
   
 Your last dose was: Your next dose is: Take 1 Tab by mouth every twelve (12) hours. Max Daily Amount: 120 mg.  
 60 mg  
    
   
   
   
  
 * Notice: This list has 2 medication(s) that are the same as other medications prescribed for you. Read the directions carefully, and ask your doctor or other care provider to review them with you. CONTINUE taking these medications Instructions Each Dose to Equal  
 Morning Noon Evening Bedtime JANUVIA 50 mg tablet Generic drug:  SITagliptin Your last dose was: Your next dose is: Take 50 mg by mouth daily. 50 mg  
    
   
   
   
  
 lisinopril 20 mg tablet Commonly known as:  Valene Pencil Your last dose was: Your next dose is: Take 20 mg by mouth nightly. 20 mg  
    
   
   
   
  
 metFORMIN 500 mg tablet Commonly known as:  GLUCOPHAGE Your last dose was: Your next dose is: Take 1,000 mg by mouth two (2) times daily (with meals). 1000 mg  
    
   
   
   
  
 oxyCODONE-acetaminophen 5-325 mg per tablet Commonly known as:  PERCOCET Your last dose was: Your next dose is: Take 1 Tab by mouth every four (4) hours as needed. Max Daily Amount: 6 Tabs. 1 Tab  
    
   
   
   
  
 simvastatin 20 mg tablet Commonly known as:  ZOCOR Your last dose was: Your next dose is: Take  by mouth nightly. STOP taking these medications   
 traMADol 50 mg tablet Commonly known as:  ULTRAM  
   
  
  
  
Where to Get Your Medications These medications were sent to [facility] # [address], [facility] [address]  [address] Phone:  [phone]  
  lubiPROStone 8 mcg capsule Information on where to get these meds will be given to you by the nurse or doctor. ! Ask your nurse or doctor about these medications  
  lactulose 10 gram/15 mL solution  
 lactulose 10 gram/15 mL solution  
 morphine CR 60 mg CR tablet  
 oxyCODONE-acetaminophen 5-325 mg per tablet Opioid Education Prescription Opioids: What You Need to Know: 
 
 
 
F-face looks uneven A-arms unable to move or move unevenly S-speech slurred or non-existent T-time-call 911 as soon as signs and symptoms begin-DO NOT go Back to bed or wait to see if you get better-TIME IS BRAIN. Warning Signs of HEART ATTACK Call 911 if you have these symptoms: 
? Chest discomfort. Most heart attacks involve discomfort in the center of the chest that lasts more than a few minutes, or that goes away and comes back. It can feel like uncomfortable pressure, squeezing, fullness, or pain. ? Discomfort in other areas of the upper body. Symptoms can include pain or discomfort in one or both arms, the back, neck, jaw, or stomach. ? Shortness of breath with or without chest discomfort. ? Other signs may include breaking out in a cold sweat, nausea, or lightheadedness. Don't wait more than five minutes to call [address]! Fast action can save your life. Calling 911 is almost always the fastest way to get lifesaving treatment. Emergency Medical Services staff can begin treatment when they arrive  up to an hour sooner than if someone gets to the hospital by car. The discharge information has been reviewed with the patient. The patient verbalized understanding.  
Discharge medications reviewed with the patient and appropriate educational materials and side effects teaching were provided. ___________________________________________________________________________________________________________________________________ Lab Results Component Value Date/Time Hemoglobin A1c 9.3 (H) 06/11/2018 09:13 AM  
 
 
This lab test reflects that your blood sugar has been slightly elevated over the past 3 months and should be evaluated by your primary care provider. An A1C of 5.7-6.4% meets the criteria for pre-diabetes; an A1C of 6.5% or higher meets the criteria for diabetes. Steroids can increase your blood sugar so it is important to follow up with your provider to determine if your blood sugar has returned to normal or needs further treatment. This lab test reflects that your blood sugar averaged 220 mg/dL over the past 3 months. It is important to follow up with your provider on a routine basis to continue to evaluate your blood sugar and discuss any necessary changes in treatment. Constipation: Care Instructions Your Care Instructions Constipation means that you have a hard time passing stools (bowel movements). People pass stools from 3 times a day to once every 3 days. What is normal for you may be different. Constipation may occur with pain in the rectum and cramping. The pain may get worse when you try to pass stools. Sometimes there are small amounts of bright red blood on toilet paper or the surface of stools. This is because of enlarged veins near the rectum (hemorrhoids). A few changes in your diet and lifestyle may help you avoid ongoing constipation. Your doctor may also prescribe medicine to help loosen your stool. Some medicines can cause constipation. These include pain medicines and antidepressants. Tell your doctor about all the medicines you take. Your doctor may want to make a medicine change to ease your symptoms. Follow-up care is a key part of your treatment and safety. Be sure to make and go to all appointments, and call your doctor if you are having problems. It's also a good idea to know your test results and keep a list of the medicines you take. How can you care for yourself at home? · Drink plenty of fluids, enough so that your urine is light yellow or clear like water. If you have kidney, heart, or liver disease and have to limit fluids, talk with your doctor before you increase the amount of fluids you drink. · Include high-fiber foods in your diet each day. These include fruits, vegetables, beans, and whole grains. · Get at least 30 minutes of exercise on most days of the week. Walking is a good choice. You also may want to do other activities, such as running, swimming, cycling, or playing tennis or team sports. · Take a fiber supplement, such as Citrucel or Metamucil, every day. Read and follow all instructions on the label. · Schedule time each day for a bowel movement. A daily routine may help. Take your time having your bowel movement. · Support your feet with a small step stool when you sit on the toilet. This helps flex your hips and places your pelvis in a squatting position. · Your doctor may recommend an over-the-counter laxative to relieve your constipation. Examples are Milk of Magnesia and MiraLax. Read and follow all instructions on the label. Do not use laxatives on a long-term basis. When should you call for help? Call your doctor now or seek immediate medical care if: 
? · You have new or worse belly pain. ? · You have new or worse nausea or vomiting. ? · You have blood in your stools. ? Watch closely for changes in your health, and be sure to contact your doctor if: 
? · Your constipation is getting worse. ? · You do not get better as expected. Where can you learn more? Go to http://neida-maryuri.info/. Enter 21  in the search box to learn more about \"Constipation: Care Instructions. \" Current as of: March 20, 2017 Content Version: 11.4 © 3128-8162 [employer]. Care instructions adapted under license by [employer]ream network (which disclaims liability or warranty for this information). If you have questions about a medical condition or this instruction, always ask your healthcare professional. [place] [address] any warranty or liability for your use of this information. Updox Announcement We are excited to announce that we are making your provider's discharge notes available to you in Updox. You will see these notes when they are completed and signed by the physician that discharged you from your recent hospital stay. If you have any questions or concerns about any information you see in Updox, please call the Health Information Department where you were seen or reach out to your Primary Care Provider for more information about your plan of care. Introducing [facility]! Dear Rogelio Heller: Thank you for requesting a Updox account. Our records indicate that you already have an active Updox account. You can access your account anytime at [email or web address] Education Did you know that you can access your hospital and ER discharge instructions at any time in Updox? You can also review all of your test results from your hospital stay or ER visit. Additional Information If you have questions, please visit the Frequently Asked Questions section of the Updox website at [email or web address] Remember, Updox is NOT to be used for urgent needs. For medical emergencies, dial 911. Now available from your iPhone and Android! Introducing Cricket Harley As a [employer] patient, I wanted to make you aware of our electronic visit tool called Cricket Harley. [employer]eCircle allows you to connect within minutes with a medical provider 24 hours a day, seven days a week via a mobile device or tablet or logging into a secure website from your computer. You can access Foodyn from anywhere in the [place]. A virtual visit might be right for you when you have a simple condition and feel like you just dont want to get out of bed, or cant get away from work for an appointment, when your regular [employer] provider is not available (evenings, weekends or holidays), or when youre out of town and need minor care. Electronic visits cost only $49 and if the [employer] provider determines a prescription is needed to treat your condition, one can be electronically transmitted to a nearby pharmacy*. Please take a moment to enroll today if you have not already done so. The enrollment process is free and takes just a few minutes. To enroll, please download the [employer]eCircle lonnie to your tablet or phone, or visit [email or web address] to enroll on your computer. And, as an [address] [place] [address] patient with a [employer] account, the results of your visits will be scanned into your electronic medical record and your primary care provider will be able to view the scanned results. We urge you to continue to see your regular [employer] provider for your ongoing medical care. And while your primary care provider may not be the one available when you seek a Foodyn virtual visit, the peace of mind you get from getting a real diagnosis real time can be priceless. For more information on Foodyn, view our Frequently Asked Questions (FAQs) at [email or web address]. Sincerely, 
 
Oscar Crane MD 
Chief Medical Officer Jaquan Rush *:  certain medications cannot be prescribed via Foodyn Unresulted tests-please follow up with your PCP on these results Procedure/Test Authorizing Provider CBC W/O DIFF Lamonte Mcdowell MD  
 CBC WITH AUTOMATED DIFF Thania Boland MD  
 CREATININE MD Shaheed Dominguez MD  
 METABOLIC PANEL, Quentin Randolph MD  
 [facility] TECHNOLOGIST Yehuda Jin MD  
 XR ABD PORT  1 Aixa Jean MD  
 XR ABD PORT  1 Moises Parsons PA-C  
 XR Katie Salazar MD  
 XR Trevon Escalante MD  
  
Providers Seen During Your Hospitalization Provider Specialty Primary office phone Lamonte Mcdowell MD Neurosurgery [phone] Your Primary Care Physician (PCP) Primary Care Physician Office Phone Office Fax Romina Seed [phone] You are allergic to the following No active allergies Recent Documentation Height Weight BMI Smoking Status 1.829 m 120.7 kg 36.1 kg/m2 Never Smoker Emergency Contacts Name Discharge Info Relation Home Work Mobile [address] [facility] CAREGIVER [3] Spouse [3] [phone] Patient Belongings The following personal items are in your possession at time of discharge: 
  Dental Appliances: None  Visual Aid: None      Home Medications: None   Jewelry: None  Clothing: At bedside    Other Valuables: Cell Phone (with wife)  Personal Items Sent to Safe:  (none) Please provide this summary of care documentation to your next provider. Signatures-by signing, you are acknowledging that this After Visit Summary has been reviewed with you and you have received a copy. Patient Signature:  ____________________________________________________________ Date:  ____________________________________________________________  
  
Serge Sanches Provider Signature:  ____________________________________________________________ Date:  ____________________________________________________________

## 2018-06-13 NOTE — PERIOP NOTES
Reviewed PTA medication list with patient/caregiver and patient/caregiver denies any additional medications.  Patient admits to having a responsible adult care for them for at least 24 hours after surgery.     Dual skin assessment completed by Lincoln Baker RN and Gloria Love RN

## 2018-06-14 ENCOUNTER — HOME HEALTH ADMISSION (OUTPATIENT)
Dept: HOME HEALTH SERVICES | Facility: HOME HEALTH | Age: 63
End: 2018-06-14

## 2018-06-14 ENCOUNTER — APPOINTMENT (OUTPATIENT)
Dept: GENERAL RADIOLOGY | Age: 63
DRG: 455 | End: 2018-06-14
Attending: SPECIALIST
Payer: COMMERCIAL

## 2018-06-14 LAB
ERYTHROCYTE [DISTWIDTH] IN BLOOD BY AUTOMATED COUNT: 13 % (ref 11.6–14.5)
GLUCOSE BLD STRIP.AUTO-MCNC: 234 MG/DL (ref 70–110)
GLUCOSE BLD STRIP.AUTO-MCNC: 253 MG/DL (ref 70–110)
GLUCOSE BLD STRIP.AUTO-MCNC: 267 MG/DL (ref 70–110)
GLUCOSE BLD STRIP.AUTO-MCNC: 286 MG/DL (ref 70–110)
HCT VFR BLD AUTO: 35.8 % (ref 36–48)
HGB BLD-MCNC: 11.6 G/DL (ref 13–16)
MCH RBC QN AUTO: 28.6 PG (ref 24–34)
MCHC RBC AUTO-ENTMCNC: 32.4 G/DL (ref 31–37)
MCV RBC AUTO: 88.2 FL (ref 74–97)
PLATELET # BLD AUTO: 150 K/UL (ref 135–420)
PMV BLD AUTO: 12.6 FL (ref 9.2–11.8)
RBC # BLD AUTO: 4.06 M/UL (ref 4.7–5.5)
WBC # BLD AUTO: 11 K/UL (ref 4.6–13.2)

## 2018-06-14 PROCEDURE — 74230 X-RAY XM SWLNG FUNCJ C+: CPT

## 2018-06-14 PROCEDURE — 97116 GAIT TRAINING THERAPY: CPT

## 2018-06-14 PROCEDURE — 65270000029 HC RM PRIVATE

## 2018-06-14 PROCEDURE — 74011636637 HC RX REV CODE- 636/637: Performed by: PHYSICIAN ASSISTANT

## 2018-06-14 PROCEDURE — 92610 EVALUATE SWALLOWING FUNCTION: CPT

## 2018-06-14 PROCEDURE — 74011636637 HC RX REV CODE- 636/637: Performed by: INTERNAL MEDICINE

## 2018-06-14 PROCEDURE — 74011250636 HC RX REV CODE- 250/636: Performed by: SPECIALIST

## 2018-06-14 PROCEDURE — 97166 OT EVAL MOD COMPLEX 45 MIN: CPT

## 2018-06-14 PROCEDURE — 82962 GLUCOSE BLOOD TEST: CPT

## 2018-06-14 PROCEDURE — 85027 COMPLETE CBC AUTOMATED: CPT | Performed by: SPECIALIST

## 2018-06-14 PROCEDURE — 74011250637 HC RX REV CODE- 250/637: Performed by: SPECIALIST

## 2018-06-14 PROCEDURE — 74011000255 HC RX REV CODE- 255: Performed by: SPECIALIST

## 2018-06-14 PROCEDURE — 92611 MOTION FLUOROSCOPY/SWALLOW: CPT

## 2018-06-14 PROCEDURE — 36415 COLL VENOUS BLD VENIPUNCTURE: CPT | Performed by: SPECIALIST

## 2018-06-14 RX ORDER — MORPHINE SULFATE 30 MG/1
30 TABLET, FILM COATED, EXTENDED RELEASE ORAL EVERY 12 HOURS
Status: DISCONTINUED | OUTPATIENT
Start: 2018-06-14 | End: 2018-06-17 | Stop reason: HOSPADM

## 2018-06-14 RX ORDER — INSULIN LISPRO 100 [IU]/ML
5 INJECTION, SOLUTION INTRAVENOUS; SUBCUTANEOUS
Status: DISCONTINUED | OUTPATIENT
Start: 2018-06-14 | End: 2018-06-15

## 2018-06-14 RX ADMIN — BARIUM SULFATE 700 MG: 700 TABLET ORAL at 12:19

## 2018-06-14 RX ADMIN — BARIUM SULFATE 10 ML: 400 PASTE ORAL at 12:19

## 2018-06-14 RX ADMIN — OXYCODONE HYDROCHLORIDE AND ACETAMINOPHEN 1 TABLET: 5; 325 TABLET ORAL at 18:06

## 2018-06-14 RX ADMIN — INSULIN LISPRO 9 UNITS: 100 INJECTION, SOLUTION INTRAVENOUS; SUBCUTANEOUS at 22:10

## 2018-06-14 RX ADMIN — SODIUM CHLORIDE 75 ML/HR: 900 INJECTION, SOLUTION INTRAVENOUS at 07:13

## 2018-06-14 RX ADMIN — DOCUSATE SODIUM 100 MG: 100 CAPSULE, LIQUID FILLED ORAL at 09:10

## 2018-06-14 RX ADMIN — Medication 2 G: at 09:10

## 2018-06-14 RX ADMIN — Medication 10 ML: at 14:00

## 2018-06-14 RX ADMIN — OXYCODONE HYDROCHLORIDE AND ACETAMINOPHEN 1 TABLET: 5; 325 TABLET ORAL at 22:28

## 2018-06-14 RX ADMIN — DEXAMETHASONE SODIUM PHOSPHATE 4 MG: 4 INJECTION, SOLUTION INTRAMUSCULAR; INTRAVENOUS at 01:24

## 2018-06-14 RX ADMIN — DOCUSATE SODIUM 100 MG: 100 CAPSULE, LIQUID FILLED ORAL at 20:17

## 2018-06-14 RX ADMIN — DEXAMETHASONE SODIUM PHOSPHATE 4 MG: 4 INJECTION, SOLUTION INTRAMUSCULAR; INTRAVENOUS at 09:11

## 2018-06-14 RX ADMIN — INSULIN LISPRO 5 UNITS: 100 INJECTION, SOLUTION INTRAVENOUS; SUBCUTANEOUS at 17:32

## 2018-06-14 RX ADMIN — INSULIN GLARGINE 15 UNITS: 100 INJECTION, SOLUTION SUBCUTANEOUS at 22:09

## 2018-06-14 RX ADMIN — MORPHINE SULFATE 30 MG: 30 TABLET, EXTENDED RELEASE ORAL at 20:17

## 2018-06-14 RX ADMIN — BARIUM SULFATE 40 G: 960 POWDER, FOR SUSPENSION ORAL at 12:19

## 2018-06-14 RX ADMIN — INSULIN LISPRO 6 UNITS: 100 INJECTION, SOLUTION INTRAVENOUS; SUBCUTANEOUS at 09:11

## 2018-06-14 RX ADMIN — Medication 2 G: at 01:24

## 2018-06-14 RX ADMIN — SIMVASTATIN 20 MG: 20 TABLET, FILM COATED ORAL at 22:08

## 2018-06-14 RX ADMIN — INSULIN LISPRO 9 UNITS: 100 INJECTION, SOLUTION INTRAVENOUS; SUBCUTANEOUS at 13:18

## 2018-06-14 RX ADMIN — MORPHINE SULFATE 30 MG: 30 TABLET, EXTENDED RELEASE ORAL at 09:10

## 2018-06-14 RX ADMIN — INSULIN LISPRO 9 UNITS: 100 INJECTION, SOLUTION INTRAVENOUS; SUBCUTANEOUS at 17:31

## 2018-06-14 NOTE — PROGRESS NOTES
Problem: Self Care Deficits Care Plan (Adult)  Goal: *Acute Goals and Plan of Care (Insert Text)  Short Term Goal 6/14/18:  Joseph Murray OTR/L  In one session:  1. Pt will perform basic self care and functional ADL transfers with modified independence to supervision using AE/DME/AD as needed in order to return home safely with wife. Goal met after OT session 6/14/18. Wife is supportive and will be able to assist as needed. Recommend elevated toilet seat at home. Outcome: Resolved/Met Date Met: 06/14/18  Occupational Therapy EVALUATION/discharge    Patient: Luis Miguel Cavazos (65 y.o. male)  Date: 6/14/2018  Primary Diagnosis: LUMBAR STENOSIS  Lumbar stenosis with neurogenic claudication  Procedure(s) (LRB):  DECOMPRESSIVE LUMBAR LAMINECTOMY (L4-L5-S1),(ALMARAZ PROCEDURE AT L5-S1) AUTOLOGOUS BONE, TLIF CAGE AND PEDICLE SCREWS WITH C-ARM,  (N/A) 1 Day Post-Op   Precautions: LSO brace OOB  Fall, Back, Spinal    ASSESSMENT AND RECOMMENDATIONS:  Based on the objective data described below, the patient presents with ability to perform ADL tasks at near baseline level. After OT session today patient was able to perform basic self care and functional ADL transfers with supervision to modified independent. Wife and patient educated on compensatory dressing/toielting techniques abiding to spinal precautions. Pt able to don/doff brace independently. Pt with no further OT/ADL concerns at this time. Wife is supportive and will be able to assist as needed. Skilled occupational therapy is not indicated at this time. Education: compensatory dressing tech; safety; spinal precautions    Discharge Recommendations: Home Health  Further Equipment Recommendations for Discharge: bedside commode (3 in 1 commode or elevated toilet seat), rolling walker and N/A      SUBJECTIVE:   Patient stated I cannot bend forward? Vannessa Charlton    OBJECTIVE DATA SUMMARY:     Past Medical History:   Diagnosis Date    Adverse effect of anesthesia 2018 hoarseness  after recent ACDF surgery, some difficulty swallowing    Arthritis     Cancer ([address]     Basal cell skin cancers    Chronic low back pain     Diabetic neuropathy (HCC)     DM (diabetes mellitus) (HCC)     GERD (gastroesophageal reflux disease)     Hematuria     HTN (hypertension)     Hyperlipidemia     Pyogenic granuloma of skin and subcutaneous tissue     Thyroid disease 1979    Varicella      Past Surgical History:   Procedure Laterality Date    HX APPENDECTOMY      HX BACK SURGERY  2018    cervical    HX ENDOSCOPY      HX HEENT      partial thyroidectomy    HX UROLOGICAL  2010's    Cystoscopy, ureteroscopy with stent placement    NEUROLOGICAL PROCEDURE UNLISTED      Epidural steroid injections     Barriers to Learning/Limitations: None  Compensate with: visual, verbal, tactile, kinesthetic cues/model    G-Codes (GP)  Self Care   Current  CJ= 20-39%   Goal  CJ= 20-39%   D/C  CJ= 20-39%  The severity rating is based on the professional judgement & direct observation of Level of Assistance required for Functional Mobility and ADLs. Eval Complexity: History: MEDIUM Complexity : Expanded review of history including physical, cognitive and psychosocial  history ; Examination: MEDIUM Complexity : 3-5 performance deficits relating to physical, cognitive , or psychosocial skils that result in activity limitations and / or participation restrictions; Decision Making:MEDIUM Complexity : Patient may present with comorbidities that affect occupational performnce. Miniml to moderate modification of tasks or assistance (eg, physical or verbal ) with assesment(s) is necessary to enable patient to complete evaluation     Prior Level of Function/Home Situation: Pt lives with wife. Pt had previous surgery 8 weeks ago and has been using [address] for ambulation. Pt currently off work until recovery complete from surgeries.     Home Situation  Home Environment: Private residence  # Steps to Enter: 4  Rails to [facility]: Yes  One/Two Story Residence: One story  Living Alone: No  Support Systems: Spouse/Significant Other/Partner  Patient Expects to be Discharged to[de-identified] Private residence  Current DME Used/Available at Home: Cane, straight  Tub or Shower Type: Tub/Shower combination  Cognitive/Behavioral Status:  Neurologic State: Alert; Appropriate for age  Orientation Level: Oriented X4  Cognition: Appropriate decision making  Safety/Judgement: Awareness of environment  Skin: site of incision back  Edema: mild LEs  Coordination:  Fine Motor Skills-Upper: Left Intact; Right Intact    Gross Motor Skills-Upper: Left Intact; Right Intact  Balance:  Sitting: Intact  Standing: Intact; With support  Tone & Sensation:  Tone: Normal  Sensation: Impaired (numbness L hand; bilateral feet/toes)  Functional Mobility and Transfers for ADLs:  Transfers:  Sit to Stand: Supervision  Bed to Chair: Supervision    Toilet Transfer : Supervision    Bathroom Mobility: Supervision/set up  ADL Assessment:  Feeding: Independent  Oral Facial Hygiene/Grooming: Contact guard assistance  Upper Body Dressing: Minimum assistance  Lower Body Dressing: Minimum assistance  Toileting: Contact guard assistance   ADL Intervention:    Grooming  Grooming Assistance: Modified independent  Washing Face: Modified independent  Washing Hands: Modified independent    Upper Body Dressing Assistance  Orthotics(Brace): Supervision/set-up    Lower Body Dressing Assistance  Underpants: Supervision/set-up  Pants With Elastic Waist: Supervision/set-up  Socks: Supervision/set-up  Cues: Verbal cues provided    Toileting  Toileting Assistance: Modified independent  Clothing Management: Modified independent  Adaptive Equipment: Elevated seat;Walker    Cognitive Retraining  Safety/Judgement: Awareness of environment    Pain:  Pre-treatment: 5/10; nurse aware  Post-treatment: 5/10  Activity Tolerance:   WFLs; able to perform dressing tasks without rest breaks  Please refer to the flowsheet for vital signs taken during this treatment. After treatment:   [x]  Patient left in no apparent distress sitting up in chair  []  Patient left in no apparent distress in bed  [x]  Call bell left within reach  [x]  Nursing notified  []  Caregiver present  []  Bed alarm activated    COMMUNICATION/EDUCATION:   Communication/Collaboration:  [x]      Home safety education was provided and the patient/caregiver indicated understanding. [x]      Patient/family have participated as able and agree with findings and recommendations. []      Patient is unable to participate in plan of care at this time.     Thank you for this referral.  Erik Koenig, ANGE  Time Calculation: 12 mins

## 2018-06-14 NOTE — PROGRESS NOTES
[address] care of patient at this time. Patient is alert and oriented x 4. Patient denies chest pain, shortness of breath, or numbness or tingling in the upper extremities. Patient does have complaints of numbness and tingling in both feet that was present before surgery. Abdominal pad dressing on the posterior lower back is clean, dry and intact. Dressing over hemovac site is clean, dry and intact. Sequential compression device in place on the patient bilaterally. 20g IV in the left hand is patent and infusing NS @ 75mL/hr. Patient currently experiencing 4/10 pain. Bed is in lowest position with the wheels locked. Siderails x 3 are up. Call bell within reach. Patient is currently resting in bed in no apparent distress. Will continue to monitor. 2040 - Patient is lying in bed resting quietly. Spouse at the bedside as well. Will continue to monitor. 2130 - Patient is lying in bed, resting quietly. Patient's stated pain level is currently a 5/10. Encouraged PCA. Spouse at the bedside. Will continue to monitor. 2218 - Head to toe assessment performed at this time. Patient has no complaints of chest pain or shortness of breath. Denies any numbness or tingling in upper extremities. Lungs are clear to auscultation. Bowel sounds are present in all 4 quadrants, but hypoactive. 20g IV in the left hand is patent and infusing with no signs of phlebitis or infiltration. Enciso catheter is patent and draining clear, yellow urine. Hemovac is charged, patent and draining sanguinous fluid. Patient educated how to  actively manage their pain. Patient encouraged to use the incentive spirometer. Patient educated on the side effects of medications. Explained the importance of ambulation. Instructed the patient not to attempt to get out of bed and/or ambulate without a staff member present. Patient verbalized understanding.  Patient left in bed with call light within reach, bed in lowest position with wheels locked, and siderails x 3 up. Will continue to monitor. 2345 - Patient lying in bed resting quietly. Spouse at the bedside. Will continue to monitor. 0125 - Patient lying in bed resting quietly. Spouse at the bedside. Will continue to monitor. 0400 - Patient lying in bed with eyes closed, breathing regularly and evenly. Will continue to monitor. 0455 - CHG wipes completed at this time. Will continue to monitor. 0600 - Enciso catheter discontinued at this time. Will continue to monitor.

## 2018-06-14 NOTE — WOUND CARE
Patient seen for hospital wide pressure injury prevalence. Full assessment completed, sacrum, heels and bilateral ischium intact, no redness noted. Patient repositioned at this time. Please consult wound care for changes in patient skin assessment during this admission.

## 2018-06-14 NOTE — PROGRESS NOTES
Problem: Dysphagia (Adult)  Goal: *Acute Goals and Plan of Care (Insert Text)  Recommendations:  Diet: Regular/thin  Meds: Per patient preference  Aspiration Precautions  Oral Care TID  Other: MBS this day     Goals:  Patient will:  1. Tolerate PO trials with 0 s/s overt distress in 4/5 trials  2. Utilize compensatory swallow strategies/maneuvers (decrease bite/sip, size/rate, alt. liq/sol) with min cues in 4/5 trials  3. Perform oral-motor/laryngeal exercises to increase oropharyngeal swallow function with min cues  4. Complete an objective swallow study (i.e., MBSS) to assess swallow integrity, r/o aspiration, and determine of safest LRD, min A       Outcome: Progressing Towards Goal  Speech Pathology Modified barium swallow Study    Patient: Jose Kelly (65 y.o. male)  Date: 6/14/2018  Primary Diagnosis: LUMBAR STENOSIS  Lumbar stenosis with neurogenic claudication  Procedure(s) (LRB):  DECOMPRESSIVE LUMBAR LAMINECTOMY (L4-L5-S1),(ALMARAZ PROCEDURE AT L5-S1) AUTOLOGOUS BONE, TLIF CAGE AND PEDICLE SCREWS WITH C-ARM,  (N/A) 1 Day Post-Op   Precautions: Aspiration  Fall, Back, Spinal  PLOF: Independent  ASSESSMENT :  MBS completed without aspiration across all consistency trials, to include thin liquid +/- straw, puree, solid and 13 mm Ba+ tablet with thin liquid wash. Pt with trace penetration on thin liquid +/- straw, however no aspiration occured. Puree and solid trials; pt with premature spillage across both trials, otherwise WFL. 13 mm Ba+ tablet with thin liquid wash; WFL. Pt presents with minimal oropharyngeal dysphagia, as evidenced above, which places pt at risk for aspiration. At this time, safest for regular solid, thin liquid diet with reduced rate and size of intake. SLP utilized video of study for visual feedback, education, and recommendations for pt/caregiver; verbalized comprehension. ST will follow 1-2x to assess diet tolerance and education.      Patient will benefit from skilled intervention to address the above impairments. Patients rehabilitation potential is considered to be Good  Factors which may influence rehabilitation potential include:   [x]              None noted  []              Mental ability/status  []              Medical condition  []              Home/family situation and support systems  []              Safety awareness  []              Pain tolerance/management  []              Other:      PLAN :  Recommendations and Planned Interventions:  Regular/thin  Frequency/Duration: Patient will be followed by speech-language pathology 1-2 times to address goals. Discharge Recommendations: None     SUBJECTIVE:   Patient stated Thank you.     OBJECTIVE:     Past Medical History:   Diagnosis Date    Adverse effect of anesthesia 2018    hoarseness  after recent ACDF surgery, some difficulty swallowing    Arthritis     Cancer ([facility] [address]     Basal cell skin cancers    Chronic low back pain     Diabetic neuropathy (HCC)     DM (diabetes mellitus) ([facility] [address]     GERD (gastroesophageal reflux disease)     Hematuria     HTN (hypertension)     Hyperlipidemia     Pyogenic granuloma of skin and subcutaneous tissue     Thyroid disease 1979    Varicella      Past Surgical History:   Procedure Laterality Date    HX APPENDECTOMY      HX BACK SURGERY  2018    cervical    HX ENDOSCOPY      HX HEENT      partial thyroidectomy    HX UROLOGICAL  2010's    Cystoscopy, ureteroscopy with stent placement    NEUROLOGICAL PROCEDURE UNLISTED      Epidural steroid injections     Prior Level of Function/Home Situation: Independent  Home Situation  Home Environment: Private residence  # Steps to Enter: 4  Rails to Enter: Yes  One/Two Story Residence: One story  Living Alone: No  Support Systems: Spouse/Significant Other/Partner  Patient Expects to be Discharged to[de-identified] Private residence  Current DME Used/Available at Home: Cane, straight  Tub or Shower Type: Tub/Shower combination  Diet prior to admission: Regular/thin  Current Diet:  Regular/thin   Radiologist:   Bairon Limbo: [id number] Swallow Current Status CI= 1-19%   Swallow Goal Status CH= 0%    The severity rating is based on the following outcomes:  DALJIT Noms Swallow Level 6    8-point Penetration-Aspiration Scale: Score 2  Clinical Judgement    PAIN:  Start of Study: 0  End of Study: 0     COMMUNICATION/EDUCATION:   [x]  Aspiration risks/precautions; compensatory swallow techniques  [x]  Patient/family have participated as able in goal setting and plan of care. [x]  Patient/family agree to work toward stated goals and plan of care. []  Patient understands intent and goals of therapy, but is neutral about his/her participation. []  Patient is unable to participate in goal setting and plan of care.     Thank you for this referral.  Jae Arceo, SLP  Time Calculation: 30 mins

## 2018-06-14 NOTE — PROGRESS NOTES
Hospitalist Progress Note    Patient: Radha Conn MRN: [id number]  CSN: [id number]    YOB: 1955  Age: 65 y.o. Sex: male    DOA: 6/13/2018 LOS:  LOS: 1 day          Chief Complaint:    Consult for Medical Management    Assessment/Plan     1. S/P Lumbar Surgery  2. DM2  3. HTN  4. Dyslipidemia    1. Continue postoperative management per primary team - PT/OT, DVT Prophylaxis, pain control, d/c planning. 2. Continue with Lantus 15 units daily, added Humalog 5 units with meals. Continue SSI, ADA diet, hypoglycemia protocols. 3. BP stable, continue regimen. 4. Continue Zocor. Patient experienced some left hand swelling surrounding IV site, advised RN that site may have infiltrated and may require new IV. Patient Active Problem List   Diagnosis Code    Hematuria R31.9    Renal calculus N20.0    Right flank pain R10.9    Cervical spondylosis with radiculopathy M47.22    Pain at surgical incision L76.82    Lumbar stenosis with neurogenic claudication M48.062       Subjective:    Some occasional discomfort but no other concerns or complaints. Review of systems:    Constitutional: denies fevers, chills, myalgias  Respiratory: denies SOB, cough  Cardiovascular: denies chest pain, palpitations  Gastrointestinal: denies nausea, vomiting, diarrhea      Vital signs/Intake and Output:  Visit Vitals    /80 (BP 1 Location: Left arm, BP Patient Position: Post activity; Sitting)    Pulse 82    Temp 98 °F (36.7 °C)    Resp 16    Ht 6' (1.829 m)    Wt 120.7 kg (266 lb 3 oz)    SpO2 93%    BMI 36.1 kg/m2     Current Shift:  06/14 0701 - 06/14 1900  In: -   Out: 700 [Urine:700]  Last three shifts:  06/12 1901 - 06/14 0700  In: 3248.6 [P.O.:300;  I.V.:2948.6]  Out: 4784 [Urine:2825; Drains:400]    Exam:    General: Well developed, alert, NAD, OX3  Head/Neck: NCAT, supple, No masses, No lymphadenopathy  CVS:Regular rate and rhythm, no M/R/G, S1/S2 heard, no thrill  Lungs:Clear to auscultation bilaterally, no wheezes, rhonchi, or rales  Abdomen: Soft, Nontender, No distention, Normal Bowel sounds, No hepatomegaly  Extremities: No C/C/E, pulses palpable 2+  Skin:normal texture and turgor, no rashes, no lesions  Neuro:grossly normal , follows commands  Psych:appropriate                Labs: Results:       Chemistry No results for input(s): GLU, NA, K, CL, CO2, BUN, CREA, CA, AGAP, BUCR, TBIL, GPT, AP, TP, ALB, GLOB, AGRAT in the last 72 hours. CBC w/Diff Recent Labs      06/14/18   0310   WBC  11.0   RBC  4.06*   HGB  11.6*   HCT  35.8*   PLT  150      Cardiac Enzymes No results for input(s): CPK, CKND1, GRECIA in the last 72 hours. No lab exists for component: CKRMB, TROIP   Coagulation No results for input(s): PTP, INR, APTT in the last 72 hours. No lab exists for component: INREXT    Lipid Panel No results found for: CHOL, CHOLPOCT, CHOLX, CHLST, CHOLV, 131123, HDL, LDL, LDLC, DLDLP, 579913, VLDLC, VLDL, TGLX, TRIGL, TRIGP, TGLPOCT, CHHD, CHHDX   BNP No results for input(s): BNPP in the last 72 hours. Liver Enzymes No results for input(s): TP, ALB, TBIL, AP, SGOT, GPT in the last 72 hours.     No lab exists for component: DBIL   Thyroid Studies No results found for: T4, T3U, TSH, TSHEXT     Procedures/imaging: see electronic medical records for all procedures/Xrays and details which were not copied into this note but were reviewed prior to creation of [address]

## 2018-06-14 NOTE — PROGRESS NOTES
Problem: Mobility Impaired (Adult and Pediatric)  Goal: *Acute Goals and Plan of Care (Insert Text)  Goals to be addressed in 1-4 days:  STG  1. Rolling L to R to L modified independent for positioning. 2. Supine to sit to supine S with HR for meals. 3. Sit to stand to sit S with RW in prep for ambulation. LTG  1. Ambulate >150ft S with RW, WBAT, for home/community mobility. 2. Ascend/descend a >4 stair steps CGA with HR for home entry. 3. Tolerate up in chair 1-2 hours for ADLs. 4. Patient/family to be independent with HEP for follow-up care and safe discharge. Outcome: Progressing Towards Goal  physical Therapy TREATMENT    Patient: Ria Davis (65 y.o. male)  Date: 6/14/2018  Diagnosis: LUMBAR STENOSIS  Lumbar stenosis with neurogenic claudication <principal problem not specified>  Procedure(s) (LRB):  DECOMPRESSIVE LUMBAR LAMINECTOMY (L4-L5-S1),(ALMARAZ PROCEDURE AT L5-S1) AUTOLOGOUS BONE, TLIF CAGE AND PEDICLE SCREWS WITH C-ARM,  (N/A) 1 Day Post-Op  Precautions: Fall, Back, Spinal   Chart, physical therapy assessment, plan of care and goals were reviewed. PLOF:independent, ambulatory without AD  ASSESSMENT:  Pt sitting in chair with LSO donned upon entering room. VC for UE placement for sit to stand from low seat. Ambulated 240ft with RW, reciprocal gt pattern, steady pace, no LOB or path deviations. Negotiated 5 steps holding (B)hand rails. No buckling noted during gait this session. Pt returned to sitting in chair. Will follow up tomorrow for one more PT session to practice bed mobility and to ensure continued improvement of (B) LE strength. Education: lumbar precautions  Progression toward goals:  [x]      Improving appropriately and progressing toward goals  []      Improving slowly and progressing toward goals  []      Not making progress toward goals and plan of care will be adjusted     PLAN:  Patient continues to benefit from skilled intervention to address the above impairments. Continue treatment per established plan of care. Discharge Recommendations:  Home Health  Further Equipment Recommendations for Discharge:  rolling walker     SUBJECTIVE:   Patient stated Good, are you ready for me?     OBJECTIVE DATA SUMMARY:   Critical Behavior:  Neurologic State: Alert  Orientation Level: Oriented X4  Cognition: Appropriate decision making, Appropriate for age attention/concentration, Appropriate safety awareness, Follows commands  Safety/Judgement: Awareness of environment  Functional Mobility Training:  Bed Mobility:  Supine to Sit: Contact guard assistance; Additional time  Transfers:  Sit to Stand: Stand-by assistance;Contact guard assistance  Stand to Sit: Stand-by assistance  Bed to Chair: Supervision  Balance:  Sitting: Intact  Standing: With support  Standing - Static: Good  Standing - Dynamic : Fair  Ambulation/Gait Training:  Distance (ft): 240 Feet (ft)  Assistive Device: Brace/Splint;Gait belt;Walker, rolling  Ambulation - Level of Assistance: Contact guard assistance;Stand-by assistance  Gait Abnormalities: Antalgic  Speed/Adriana: Slow  Interventions: Verbal cues  Stairs:  Number of Stairs Trained: 5  Stairs - Level of Assistance: Supervision;Stand-by assistance  Rail Use: Both  GCODE:n/a    Pain:  Pre:2  Post:3  Pain Scale 1: Numeric (0 - 10)    Pain Location 1: Back  Pain Orientation 1: Lower  Pain Description 1: Aching    Activity Tolerance:   Good  Please refer to the flowsheet for vital signs taken during this treatment.   After treatment:   [x] Patient left in no apparent distress sitting up in chair  [] Patient left in no apparent distress in bed  [x] Call bell left within reach  [] Nursing notified  [] Caregiver present  [] Bed alarm activated      Amandeep Delgado PTA   Time Calculation: 14 mins

## 2018-06-14 NOTE — PROGRESS NOTES
5426 - Bedside report received from Saundra Tobar Rn. Patient in chair at this time. Pain 3/10. Plan of care for the day addressed with the patient. 1788 - Patient in chair at this time. A/O x 4. IV to left hand intact and patent. SCDs bilaterally. ABD with tape dressing to lower back CDI. Hemovac intact and draining. Reports ttingling to bilateral lower extremities. Pedal pulses palpable. Lungs clear. Bowel sounds active to all quadrants. Patient able to get to 3000 on the incentive spirometer. Pain 3/10. Notified by SLP that patient will have barium swallow today. 1130 - Dressing to lower back removed. Hemovac removed. 75ml drainage emptied. Incision intact with dermabond. No s/s of infection noted. Mepilex dressing applied. Patient tolerated well. 1237 - Patient returns from barium swallow study. 1806 - PRN pain medication administered. 1915 - Bedside and Verbal shift change report given to Saundra Tobar RN by Manoj Gregory RN. Report included the following information SBAR, Kardex, OR Summary, Intake/Output and MAR.

## 2018-06-14 NOTE — PROGRESS NOTES
Problem: Dysphagia (Adult)  Goal: *Acute Goals and Plan of Care (Insert Text)  Recommendations:  Diet: Regular/thin  Meds: Per patient preference  Aspiration Precautions  Oral Care TID  Other: MBS this day     Goals:  Patient will:  1. Tolerate PO trials with 0 s/s overt distress in 4/5 trials  2. Utilize compensatory swallow strategies/maneuvers (decrease bite/sip, size/rate, alt. liq/sol) with min cues in 4/5 trials  3. Perform oral-motor/laryngeal exercises to increase oropharyngeal swallow function with min cues  4. Complete an objective swallow study (i.e., MBSS) to assess swallow integrity, r/o aspiration, and determine of safest LRD, min A      Outcome: Progressing Towards Goal  Speech LAnguage Pathology bedside swallow evaluation    Patient: Radha Conn (65 y.o. male)  Date: 6/14/2018  Primary Diagnosis: LUMBAR STENOSIS  Lumbar stenosis with neurogenic claudication  Procedure(s) (LRB):  DECOMPRESSIVE LUMBAR LAMINECTOMY (L4-L5-S1),(ALMARAZ PROCEDURE AT L5-S1) AUTOLOGOUS BONE, TLIF CAGE AND PEDICLE SCREWS WITH C-ARM,  (N/A) 1 Day Post-Op   Precautions: Aspiration  Fall, Back, Spinal    PLOF: Independent    ASSESSMENT :  Based on the objective data described below, the patient presents with mod pharyngeal dysphagia. Pt A&Ox4 with wife at bedside. Per pt report; experiencing frequent cough/choking with thin liquid following cervical surgery April 2018. Oral-motor exam revealed structures grossly intact for mastication and deglutition. Basic cognitive-linguistic functioning appears intact. Regular diet at baseline. Pt self-fed thin liquid via straw with serial swallows, puree and solid cracker trials; WFL, no overt s/s of aspiration. Recommend continuation of regular/thin liquid diet with aspiration precautions, MBS this day to further assess swallow integrity and rule out silent aspiration. D/w RN, Severo Flatness. ST will follow as indicated.        Patient will benefit from skilled intervention to address the above impairments. Patients rehabilitation potential is considered to be Good  Factors which may influence rehabilitation potential include:   [x]            None noted  []            Mental ability/status  []            Medical condition  []            Home/family situation and support systems  []            Safety awareness  []            Pain tolerance/management  []            Other:      PLAN :  Recommendations and Planned Interventions:  Regular/thin, MBS  Frequency/Duration: Patient will be followed by speech-language pathology 1-2 times per day/4-7 days per week to address goals. Discharge Recommendations: To Be Determined     SUBJECTIVE:   Patient stated I choke a lot, but only when I drink, not with food.     OBJECTIVE:     Past Medical History:   Diagnosis Date    Adverse effect of anesthesia 2018    hoarseness  after recent ACDF surgery, some difficulty swallowing    Arthritis     Cancer ([address]     Basal cell skin cancers    Chronic low back pain     Diabetic neuropathy (HCC)     DM (diabetes mellitus) ([address]     GERD (gastroesophageal reflux disease)     Hematuria     HTN (hypertension)     Hyperlipidemia     Pyogenic granuloma of skin and subcutaneous tissue     Thyroid disease 1979    Varicella      Past Surgical History:   Procedure Laterality Date    HX APPENDECTOMY      HX BACK SURGERY  2018    cervical    HX ENDOSCOPY      HX HEENT      partial thyroidectomy    HX UROLOGICAL  2010's    Cystoscopy, ureteroscopy with stent placement    NEUROLOGICAL PROCEDURE UNLISTED      Epidural steroid injections     Prior Level of Function/Home Situation: Living with family  Home Situation  Home Environment: Private residence  # Steps to Enter: 4  Rails to Enter: Yes  One/Two Story Residence: One story  Living Alone: No  Support Systems: Spouse/Significant Other/Partner  Patient Expects to be Discharged to[de-identified] Private residence  Current DME Used/Available at Home: Cane, straight  Tub or Shower Type: Tub/Shower combination  Diet prior to admission: Regular/thin  Current Diet:  Regular/thin   Cognitive and Communication Status:  Neurologic State: Alert  Orientation Level: Oriented X4  Cognition: Appropriate decision making, Appropriate for age attention/concentration, Appropriate safety awareness, Follows commands  Perception: Appears intact  Perseveration: No perseveration noted  Safety/Judgement: Awareness of environment  Oral Assessment:  Oral Assessment  Labial: No impairment  Dentition: Natural;Intact  Oral Hygiene: Good  Lingual: No impairment  Velum: No impairment  Mandible: No impairment  P.O. Trials:  Patient Position: HOB 60  Vocal quality prior to P.O.: Hoarse  Consistency Presented: Thin liquid; Solid;Puree  How Presented: Self-fed/presented;Straw     Bolus Acceptance: No impairment  Bolus Formation/Control: No impairment     Propulsion: No impairment  Oral Residue: None  Initiation of Swallow: No impairment  Laryngeal Elevation: Functional  Aspiration Signs/Symptoms: None  Pharyngeal Phase Characteristics: No impairment, issues, or problems   Effective Modifications: Small sips and bites  Cues for Modifications: Minimal       Oral Phase Severity: No impairment  Pharyngeal Phase Severity : Minimal    GCODESwallowing:  Swallow Current Status CJ= 20-39%   Swallow Goal Status CH= 0%    The severity rating is based on the following outcomes:  DALJIT Noms Swallow Level 5    Clinical Judgement    PAIN:  Start of Eval: 0  End of Eval: 0     After treatment:   []            Patient left in no apparent distress sitting up in chair  [x]            Patient left in no apparent distress in bed  [x]            Call bell left within reach  [x]            Nursing notified  [x]            Family present  []            Caregiver present  []            Bed alarm activated    COMMUNICATION/EDUCATION:   [x]            Safe swallowing guidelines; compensatory techniques.   [x]            Patient/family have participated as able in goal setting and plan of care. [x]            Patient/family agree to work toward stated goals and plan of care. []            Patient understands intent and goals of therapy; neutral about participation. []            Patient unable to participate in goal setting/plan of care; educ ongoing with interdisciplinary staff  []         Posted safety precautions in patient's room.     Thank you for this referral.  Víctor Alvarado, SLP  Time Calculation: 23 mins

## 2018-06-14 NOTE — PROGRESS NOTES
Speech Therapy Note:    Modified barium swallow study completed without aspiration across all consistency trials. Pt safe for regular/thin liquid diet. Full report to follow.      Phoebe Lang M.S., CF-SLP  Speech Language Pathologist

## 2018-06-14 NOTE — PROGRESS NOTES
Problem: Falls - Risk of  Goal: *Absence of Falls  Document Carlton Fall Risk and appropriate interventions in the flowsheet.    Outcome: Progressing Towards Goal  Fall Risk Interventions:  Mobility Interventions: Patient to call before getting OOB, PT Consult for mobility concerns, PT Consult for assist device competence, OT consult for ADLs, Utilize walker, cane, or other assitive device    Mentation Interventions: Door open when patient unattended    Medication Interventions: Patient to call before getting OOB, Teach patient to arise slowly    Elimination Interventions: Call light in reach, Patient to call for help with toileting needs, Toileting schedule/hourly rounds

## 2018-06-14 NOTE — ROUTINE PROCESS
Bedside and Verbal shift change report given to Mikal Davis RN (oncoming nurse) by Marry Babinski, RN (offgoing nurse). Report included the following information SBAR, Procedure Summary, Intake/Output, MAR and Recent Results.

## 2018-06-14 NOTE — PROGRESS NOTES
DC Plan:  Discharge home with [facility] tomorrow    Chart reviewed. Met with patient and spouse at bedside. Pt offered foc for home health and provided [place] list.  Pt chose [facility]. Referral will be placed with CMS for assistance. Pt needs RW. CMS will assist in placing [place] referral and RW order. Pt also had questions about toilet riser. John Melton with PT will address questions. No other concerns identified. CM will cont to follow. Per CMS, a RW will be delivered to pts room tomorrow 6/15 by [facility]. Care Management Interventions  Mode of Transport at Discharge:  Other (see comment) (family)  Transition of Care Consult (CM Consult): [address]: Yes  Physical Therapy Consult: Yes  Occupational Therapy Consult: Yes  Speech Therapy Consult: Yes  Current Support Network: Lives with Spouse  Confirm Follow Up Transport: Family  Plan discussed with Pt/Family/Caregiver: Yes  Freedom of Choice Offered: Yes  Discharge Location  Discharge Placement: Home with home health

## 2018-06-14 NOTE — PROGRESS NOTES
Problem: Mobility Impaired (Adult and Pediatric)  Goal: *Acute Goals and Plan of Care (Insert Text)  Goals to be addressed in 1-4 days:  STG  1. Rolling L to R to L modified independent for positioning. 2. Supine to sit to supine S with HR for meals. 3. Sit to stand to sit S with RW in prep for ambulation. LTG  1. Ambulate >150ft S with RW, WBAT, for home/community mobility. 2. Ascend/descend a >4 stair steps CGA with HR for home entry. 3. Tolerate up in chair 1-2 hours for ADLs. 4. Patient/family to be independent with HEP for follow-up care and safe discharge. Outcome: Progressing Towards Goal  physical Therapy TREATMENT    Patient: Daphne Pepe (65 y.o. male)  Date: 6/14/2018  Diagnosis: LUMBAR STENOSIS  Lumbar stenosis with neurogenic claudication <principal problem not specified>  Procedure(s) (LRB):  DECOMPRESSIVE LUMBAR LAMINECTOMY (L4-L5-S1),(ALMARAZ PROCEDURE AT L5-S1) AUTOLOGOUS BONE, TLIF CAGE AND PEDICLE SCREWS WITH C-ARM,  (N/A) 1 Day Post-Op  Precautions: Fall, Back, Spinal   Chart, physical therapy assessment, plan of care and goals were reviewed. PLOF:Independent, ambulatory without AD  ASSESSMENT:  VC for log roll tech. CGA for supine to sit and increased time. Pt donned LSO. Elevated bed to height of bed at home. Reports (B) hamstrings are numb. Mild (B) knee buckling during ambulation. Reciprocal gt pattern, steady pace, no LOB or path deviations. Negotiated 5 steps, VC needed for proper technique. C/o mild dizziness during session then increased severity upon sitting back down EOB. /80, SpO2 93% on RA. Stressed deep breathing tech and incentive spirometer use. Transport arrived to take pt to [facility].     Education: Log roll tech, IS use, stair nego tech  Progression toward goals:  []      Improving appropriately and progressing toward goals  [x]      Improving slowly and progressing toward goals  []      Not making progress toward goals and plan of care will be adjusted PLAN:  Patient continues to benefit from skilled intervention to address the above impairments. Continue treatment per established plan of care. Discharge Recommendations:  Home Health  Further Equipment Recommendations for Discharge:  rolling walker     SUBJECTIVE:   Patient stated I have an x-ray at 12:30.    OBJECTIVE DATA SUMMARY:   Critical Behavior:  Neurologic State: Alert  Orientation Level: Oriented X4  Cognition: Appropriate decision making, Appropriate for age attention/concentration, Appropriate safety awareness, Follows commands  Safety/Judgement: Awareness of environment  Functional Mobility Training:  Bed Mobility:  Supine to Sit: Contact guard assistance; Additional time  Transfers:  Sit to Stand: Contact guard assistance  Stand to Sit: Stand-by assistance  Bed to Chair: Supervision  Balance:  Sitting: Intact  Standing: With support  Standing - Static: Fair (+)  Standing - Dynamic : Fair  Ambulation/Gait Training:  Distance (ft): 160 Feet (ft)  Assistive Device: Gait belt;Walker, rolling  Ambulation - Level of Assistance: Contact guard assistance  Gait Abnormalities: Antalgic;Decreased step clearance  Speed/Adriana: Slow  Interventions: Verbal cues  Stairs:  Number of Stairs Trained: 5  Stairs - Level of Assistance: Stand-by assistance  Rail Use: Both  GCODE:n/a  Pain:  Pre:2  Post:2  Pain Scale 1: Numeric (0 - 10)  Pain Intensity 1: 2  Pain Location 1: Back  Pain Orientation 1: Lower; Posterior  Pain Description 1: Aching  Pain Intervention(s) 1: Encouraged PCA  Activity Tolerance:   Dizzy  Please refer to the flowsheet for vital signs taken during this treatment.   After treatment:   [] Patient left in no apparent distress sitting up in chair  [x] Patient left in no apparent distress sitting EOB with transport   [x] Call bell left within reach  [] Nursing notified  [] Caregiver present  [] Bed alarm activated      Marta Schultz PTA   Time Calculation: 24 mins

## 2018-06-15 ENCOUNTER — APPOINTMENT (OUTPATIENT)
Dept: GENERAL RADIOLOGY | Age: 63
DRG: 455 | End: 2018-06-15
Attending: PHYSICIAN ASSISTANT
Payer: COMMERCIAL

## 2018-06-15 ENCOUNTER — HOME CARE VISIT (OUTPATIENT)
Dept: SCHEDULING | Facility: HOME HEALTH | Age: 63
End: 2018-06-15

## 2018-06-15 ENCOUNTER — HOME CARE VISIT (OUTPATIENT)
Dept: HOME HEALTH SERVICES | Facility: HOME HEALTH | Age: 63
End: 2018-06-15

## 2018-06-15 LAB
ANION GAP SERPL CALC-SCNC: 9 MMOL/L (ref 3–18)
BASOPHILS # BLD: 0 K/UL (ref 0–0.06)
BASOPHILS NFR BLD: 0 % (ref 0–2)
BUN SERPL-MCNC: 17 MG/DL (ref 7–18)
BUN/CREAT SERPL: 15 (ref 12–20)
CALCIUM SERPL-MCNC: 8.3 MG/DL (ref 8.5–10.1)
CHLORIDE SERPL-SCNC: 102 MMOL/L (ref 100–108)
CO2 SERPL-SCNC: 28 MMOL/L (ref 21–32)
CREAT SERPL-MCNC: 1.16 MG/DL (ref 0.6–1.3)
DIFFERENTIAL METHOD BLD: ABNORMAL
EOSINOPHIL # BLD: 0 K/UL (ref 0–0.4)
EOSINOPHIL NFR BLD: 0 % (ref 0–5)
ERYTHROCYTE [DISTWIDTH] IN BLOOD BY AUTOMATED COUNT: 13.3 % (ref 11.6–14.5)
GLUCOSE BLD STRIP.AUTO-MCNC: 108 MG/DL (ref 70–110)
GLUCOSE BLD STRIP.AUTO-MCNC: 172 MG/DL (ref 70–110)
GLUCOSE BLD STRIP.AUTO-MCNC: 216 MG/DL (ref 70–110)
GLUCOSE BLD STRIP.AUTO-MCNC: 243 MG/DL (ref 70–110)
GLUCOSE SERPL-MCNC: 188 MG/DL (ref 74–99)
HCT VFR BLD AUTO: 36.9 % (ref 36–48)
HGB BLD-MCNC: 11.9 G/DL (ref 13–16)
LYMPHOCYTES # BLD: 3 K/UL (ref 0.9–3.6)
LYMPHOCYTES NFR BLD: 22 % (ref 21–52)
MCH RBC QN AUTO: 28.8 PG (ref 24–34)
MCHC RBC AUTO-ENTMCNC: 32.2 G/DL (ref 31–37)
MCV RBC AUTO: 89.3 FL (ref 74–97)
MONOCYTES # BLD: 1.5 K/UL (ref 0.05–1.2)
MONOCYTES NFR BLD: 11 % (ref 3–10)
NEUTS SEG # BLD: 9.4 K/UL (ref 1.8–8)
NEUTS SEG NFR BLD: 67 % (ref 40–73)
PLATELET # BLD AUTO: 161 K/UL (ref 135–420)
PMV BLD AUTO: 12 FL (ref 9.2–11.8)
POTASSIUM SERPL-SCNC: 4 MMOL/L (ref 3.5–5.5)
RBC # BLD AUTO: 4.13 M/UL (ref 4.7–5.5)
SODIUM SERPL-SCNC: 139 MMOL/L (ref 136–145)
WBC # BLD AUTO: 13.9 K/UL (ref 4.6–13.2)

## 2018-06-15 PROCEDURE — 74011250637 HC RX REV CODE- 250/637: Performed by: HOSPITALIST

## 2018-06-15 PROCEDURE — 82962 GLUCOSE BLOOD TEST: CPT

## 2018-06-15 PROCEDURE — 80048 BASIC METABOLIC PNL TOTAL CA: CPT | Performed by: HOSPITALIST

## 2018-06-15 PROCEDURE — 65270000029 HC RM PRIVATE

## 2018-06-15 PROCEDURE — 74011636637 HC RX REV CODE- 636/637: Performed by: HOSPITALIST

## 2018-06-15 PROCEDURE — 74011250637 HC RX REV CODE- 250/637: Performed by: SPECIALIST

## 2018-06-15 PROCEDURE — 74018 RADEX ABDOMEN 1 VIEW: CPT

## 2018-06-15 PROCEDURE — 85025 COMPLETE CBC W/AUTO DIFF WBC: CPT | Performed by: HOSPITALIST

## 2018-06-15 PROCEDURE — 92526 ORAL FUNCTION THERAPY: CPT

## 2018-06-15 PROCEDURE — 36415 COLL VENOUS BLD VENIPUNCTURE: CPT | Performed by: HOSPITALIST

## 2018-06-15 PROCEDURE — 97116 GAIT TRAINING THERAPY: CPT

## 2018-06-15 PROCEDURE — 74011636637 HC RX REV CODE- 636/637: Performed by: INTERNAL MEDICINE

## 2018-06-15 RX ORDER — OXYCODONE AND ACETAMINOPHEN 5; 325 MG/1; MG/1
1 TABLET ORAL
Qty: 60 TAB | Refills: 0 | Status: SHIPPED | OUTPATIENT
Start: 2018-06-15 | End: 2020-05-08

## 2018-06-15 RX ORDER — MORPHINE SULFATE 60 MG/1
60 TABLET, FILM COATED, EXTENDED RELEASE ORAL EVERY 12 HOURS
Qty: 30 TAB | Refills: 0 | Status: SHIPPED | OUTPATIENT
Start: 2018-06-15 | End: 2018-07-02

## 2018-06-15 RX ORDER — FACIAL-BODY WIPES
10 EACH TOPICAL DAILY PRN
Status: DISCONTINUED | OUTPATIENT
Start: 2018-06-15 | End: 2018-06-17 | Stop reason: HOSPADM

## 2018-06-15 RX ORDER — INSULIN LISPRO 100 [IU]/ML
10 INJECTION, SOLUTION INTRAVENOUS; SUBCUTANEOUS
Status: DISCONTINUED | OUTPATIENT
Start: 2018-06-15 | End: 2018-06-17 | Stop reason: HOSPADM

## 2018-06-15 RX ORDER — OXYCODONE AND ACETAMINOPHEN 5; 325 MG/1; MG/1
1-2 TABLET ORAL
Status: DISCONTINUED | OUTPATIENT
Start: 2018-06-15 | End: 2018-06-17 | Stop reason: HOSPADM

## 2018-06-15 RX ORDER — MORPHINE SULFATE 30 MG/1
30 TABLET, FILM COATED, EXTENDED RELEASE ORAL ONCE
Status: ACTIVE | OUTPATIENT
Start: 2018-06-15 | End: 2018-06-15

## 2018-06-15 RX ORDER — MAGNESIUM CITRATE
296 SOLUTION, ORAL ORAL
Status: COMPLETED | OUTPATIENT
Start: 2018-06-15 | End: 2018-06-15

## 2018-06-15 RX ADMIN — INSULIN LISPRO 10 UNITS: 100 INJECTION, SOLUTION INTRAVENOUS; SUBCUTANEOUS at 08:10

## 2018-06-15 RX ADMIN — OXYCODONE HYDROCHLORIDE AND ACETAMINOPHEN 2 TABLET: 5; 325 TABLET ORAL at 20:33

## 2018-06-15 RX ADMIN — INSULIN GLARGINE 15 UNITS: 100 INJECTION, SOLUTION SUBCUTANEOUS at 23:49

## 2018-06-15 RX ADMIN — BISACODYL 10 MG: 10 SUPPOSITORY RECTAL at 08:10

## 2018-06-15 RX ADMIN — INSULIN LISPRO 6 UNITS: 100 INJECTION, SOLUTION INTRAVENOUS; SUBCUTANEOUS at 08:09

## 2018-06-15 RX ADMIN — OXYCODONE HYDROCHLORIDE AND ACETAMINOPHEN 1 TABLET: 5; 325 TABLET ORAL at 06:50

## 2018-06-15 RX ADMIN — OXYCODONE HYDROCHLORIDE AND ACETAMINOPHEN 2 TABLET: 5; 325 TABLET ORAL at 11:24

## 2018-06-15 RX ADMIN — OXYCODONE HYDROCHLORIDE AND ACETAMINOPHEN 1 TABLET: 5; 325 TABLET ORAL at 02:21

## 2018-06-15 RX ADMIN — SIMVASTATIN 20 MG: 20 TABLET, FILM COATED ORAL at 23:11

## 2018-06-15 RX ADMIN — MAGESIUM CITRATE 296 ML: 1.75 LIQUID ORAL at 09:37

## 2018-06-15 RX ADMIN — LISINOPRIL 20 MG: 20 TABLET ORAL at 23:10

## 2018-06-15 RX ADMIN — SODIUM PHOSPHATE, DIBASIC AND SODIUM PHOSPHATE, MONOBASIC 118 ML: 7; 19 ENEMA RECTAL at 12:31

## 2018-06-15 RX ADMIN — MORPHINE SULFATE 30 MG: 30 TABLET, EXTENDED RELEASE ORAL at 08:08

## 2018-06-15 RX ADMIN — MORPHINE SULFATE 30 MG: 30 TABLET, EXTENDED RELEASE ORAL at 23:11

## 2018-06-15 RX ADMIN — DOCUSATE SODIUM 100 MG: 100 CAPSULE, LIQUID FILLED ORAL at 08:08

## 2018-06-15 RX ADMIN — DOCUSATE SODIUM 100 MG: 100 CAPSULE, LIQUID FILLED ORAL at 20:33

## 2018-06-15 RX ADMIN — OXYCODONE HYDROCHLORIDE AND ACETAMINOPHEN 2 TABLET: 5; 325 TABLET ORAL at 15:42

## 2018-06-15 RX ADMIN — INSULIN LISPRO 6 UNITS: 100 INJECTION, SOLUTION INTRAVENOUS; SUBCUTANEOUS at 23:49

## 2018-06-15 NOTE — PROGRESS NOTES
Problem: Dysphagia (Adult)  Goal: *Acute Goals and Plan of Care (Insert Text)  Recommendations:  Diet: Regular/thin  Meds: Per patient preference  Aspiration Precautions  Oral Care TID    Goals:  Patient will:  1. Tolerate PO trials with 0 s/s overt distress in 4/5 trials  2. Utilize compensatory swallow strategies/maneuvers (decrease bite/sip, size/rate, alt. liq/sol) with min cues in 4/5 trials  3. Complete an objective swallow study (i.e., MBSS) to assess swallow integrity, r/o aspiration, and determine of safest LRD, min A - goal met 6/14       Outcome: Progressing Towards Goal  Speech language pathology dysphagia treatment    Patient: Manish Forte (65 y.o. male)  Date: 6/15/2018  Diagnosis: LUMBAR STENOSIS  Lumbar stenosis with neurogenic claudication <principal problem not specified>  Procedure(s) (LRB):  DECOMPRESSIVE LUMBAR LAMINECTOMY (L4-L5-S1),(ALMARAZ PROCEDURE AT L5-S1) AUTOLOGOUS BONE, TLIF CAGE AND PEDICLE SCREWS WITH C-ARM,  (N/A) 2 Days Post-Op  Precautions: Aspiration Fall, Back, Spinal  PLOF: Independent     ASSESSMENT:  Followed up this day with dysphagia tx. Pt A&Ox4 with wife at bedside. Reported significant pain and constipation, nursing aware. Per pt and family report; previous meals tolerated well. Reviewed MBS results \"Completed without aspiration across all consistency trials, to include thin liquid, puree, solid and 13 mm Ba+ tablet with thin liquid wash. .. Recommend regular solid, thin liquid diet\". Further educated pt and wife on aspiration precautions and importance of compensatory swallow techniques to decrease aspiration risk (decrease rate of intake & sip/bite size, upright @HOB for all po intake and ~30 minutes after po); verbalized comprehension. Pt refused PO trials 2/2 pain. ST will follow up 1-2x to assess diet tolerance and education.       Progression toward goals:  [x]         Improving appropriately and progressing toward goals  []         Improving slowly and progressing toward goals  []         Not making progress toward goals and plan of care will be adjusted     PLAN:  Recommendations and Planned Interventions:  Regular/thin   Patient continues to benefit from skilled intervention to address the above impairments. Continue treatment per established plan of care. Discharge Recommendations: To Be Determined     SUBJECTIVE:   Patient stated I'm in too much pain to eat anything right now but I'll try later if I feel better. OBJECTIVE:   Cognitive and Communication Status:  Neurologic State: Alert  Orientation Level: Oriented X4  Cognition: Follows commands  Perception: Appears intact  Perseveration: No perseveration noted  Safety/Judgement: Awareness of environment  Dysphagia Treatment:  Oral Assessment:  Oral Assessment  Labial: No impairment  Dentition: Natural, Intact  Oral Hygiene: Good  Lingual: No impairment  Velum: No impairment  Mandible: No impairment  P.O. Trials:   Patient Position: HOB 60   Vocal quality prior to P.O.: Hoarse   Consistency Presented:  Thin liquid, Solid, Puree   How Presented: Self-fed/presented, Straw       Bolus Acceptance: No impairment   Bolus Formation/Control: No impairment       Propulsion: No impairment   Oral Residue: None   Initiation of Swallow: No impairment   Laryngeal Elevation: Functional   Aspiration Signs/Symptoms: None   Pharyngeal Phase Characteristics: No impairment, issues, or problems    Effective Modifications: Small sips and bites   Cues for Modifications: Minimal         Oral Phase Severity: No impairment   Pharyngeal Phase Severity : Minimal     PAIN:  Start of Tx: 0  End of Tx: 0     GCODESwallowing:  Swallow Current Status CI= 1-19%   Swallow Goal Status CH= 0%    The severity rating is based on the following outcomes:  DALJIT Noms Swallow Level 6    Clinical Judgement    After treatment:   []              Patient left in no apparent distress sitting up in chair  [x]              Patient left in no apparent distress in bed  [x]              Call bell left within reach  []              Nursing notified  [x]              Family present  []              Caregiver present  []              Bed alarm activated      COMMUNICATION/EDUCATION:   [x] Safe swallowing guidelines; compensatory techniques  []        Patient unable to participate in education; education ongoing with staff  []  Posted safety precautions in patient's room.   [] Oral-motor/laryngeal strengthening exercises      Radha Shafer SLP  Time Calculation: 11 mins

## 2018-06-15 NOTE — PROGRESS NOTES
Problem: Falls - Risk of  Goal: *Absence of Falls  Document Carlton Fall Risk and appropriate interventions in the flowsheet.    Outcome: Progressing Towards Goal  Fall Risk Interventions:  Mobility Interventions: Patient to call before getting OOB, Utilize walker, cane, or other assitive device    Mentation Interventions: Adequate sleep, hydration, pain control    Medication Interventions: Assess postural VS orthostatic hypotension, Patient to call before getting OOB, Teach patient to arise slowly    Elimination Interventions: Call light in reach, Patient to call for help with toileting needs, Urinal in reach

## 2018-06-15 NOTE — PROGRESS NOTES
Hospitalist Progress Note    Patient: Jie Aquino MRN: [id number]  CSN: [id number]    YOB: 1955  Age: 65 y.o. Sex: male    DOA: 6/13/2018 LOS:  LOS: 2 days          Chief Complaint:    Cons for Medical Management    Assessment/Plan     1. S/P Lumbar Surgery  2. DM2  3. HTN  4. Dyslipidemia    1. Continue postoperative management per primary team - PT/OT, DVT prophylaxis, pain control, d/c planning. 2. Continue current regimen. If patient is to be discharge, recommend continuing previous outpatient regimen and follow up with his PCP for continued surveillance and management. 3. Continue current regimen. If discharged, resume outpatient regimen and follow up with PCP. 4. Continue Zocor. Follow up with PCP. Patient Active Problem List   Diagnosis Code    Hematuria R31.9    Renal calculus N20.0    Right flank pain R10.9    Cervical spondylosis with radiculopathy M47.22    Pain at surgical incision L76.82    Lumbar stenosis with neurogenic claudication M48.062       Subjective:    Feeling better this AM.    Review of systems:    Constitutional: denies fevers, chills, myalgias  Respiratory: denies SOB, cough  Cardiovascular: denies chest pain, palpitations  Gastrointestinal: denies nausea, vomiting, diarrhea      Vital signs/Intake and Output:  Visit Vitals    /72 (BP 1 Location: Left arm, BP Patient Position: At rest)    Pulse 80    Temp 98.9 °F (37.2 °C)    Resp 16    Ht 6' (1.829 m)    Wt 120.7 kg (266 lb 3 oz)    SpO2 93%    BMI 36.1 kg/m2     Current Shift:     Last three shifts:  06/13 1901 - 06/15 0700  In: 1988.6 [P.O.:1040;  I.V.:948.6]  Out: 4164 [Urine:5800; Drains:475]    Exam:    General: Well developed, alert, NAD, OX3  Head/Neck: NCAT, supple, No masses, No lymphadenopathy  CVS:Regular rate and rhythm, no M/R/G, S1/S2 heard, no thrill  Lungs:Clear to auscultation bilaterally, no wheezes, rhonchi, or rales  Abdomen: Soft, Nontender, No distention, Normal Bowel sounds, No hepatomegaly  Extremities: No C/C/E, pulses palpable 2+  Skin:normal texture and turgor, no rashes, no lesions  Neuro:grossly normal , follows commands  Psych:appropriate                Labs: Results:       Chemistry Recent Labs      06/15/18   0738   GLU  188*   NA  139   K  4.0   CL  102   CO2  28   BUN  17   CREA  1.16   CA  8.3*   AGAP  9   BUCR  15      CBC w/Diff Recent Labs      06/15/18   0738  06/14/18   0310   WBC  13.9*  11.0   RBC  4.13*  4.06*   HGB  11.9*  11.6*   HCT  36.9  35.8*   PLT  161  150   GRANS  67   --    LYMPH  22   --    EOS  0   --       Cardiac Enzymes No results for input(s): CPK, CKND1, GRECIA in the last 72 hours. No lab exists for component: CKRMB, TROIP   Coagulation No results for input(s): PTP, INR, APTT in the last 72 hours. No lab exists for component: INREXT    Lipid Panel No results found for: CHOL, CHOLPOCT, CHOLX, CHLST, CHOLV, 867972, HDL, LDL, LDLC, DLDLP, 538794, VLDLC, VLDL, TGLX, TRIGL, TRIGP, TGLPOCT, CHHD, CHHDX   BNP No results for input(s): BNPP in the last 72 hours. Liver Enzymes No results for input(s): TP, ALB, TBIL, AP, SGOT, GPT in the last 72 hours.     No lab exists for component: DBIL   Thyroid Studies No results found for: T4, T3U, TSH, TSHEXT     Procedures/imaging: see electronic medical records for all procedures/Xrays and details which were not copied into this note but were reviewed prior to creation of [address], [place]

## 2018-06-15 NOTE — PROGRESS NOTES
20:15 Assessment completed. Lungs are clear bilat but are slightly decreased in the bases. Mepilex dsg on back remains C/D/I with + CMS & + PP. Continues to have numbness from the waist down with slight tingling in ft. Resting quietly in bed with TV on. Voiding per urinal w/o difficulty. Wife is @ bedside visiting. 22:45 Shift assessment completed. See nsg flow sheet for details. 02:25 Reassessed with 0 changes noted. Mepilex dsg on back remains C/D/I with + CMS & + PP. Continues to have numbness from waist down to ft but ambulates to BR w/o difficulty & remains steady on his ft. Drank 2 containers of prune juice to constipation. Wife remains @ bedside. 07:20 Bedside and Verbal shift change report given to Ethan Goodwin RN (oncoming nurse) by Niles Vidales RN (offgoing nurse). Report included the following information SBAR.

## 2018-06-15 NOTE — PROGRESS NOTES
Problem: Mobility Impaired (Adult and Pediatric)  Goal: *Acute Goals and Plan of Care (Insert Text)  Goals to be addressed in 1-4 days:  STG  1. Rolling L to R to L modified independent for positioning. 2. Supine to sit to supine S with HR for meals. 3. Sit to stand to sit S with RW in prep for ambulation. LTG  1. Ambulate >150ft S with RW, WBAT, for home/community mobility. 2. Ascend/descend a >4 stair steps CGA with HR for home entry. 3. Tolerate up in chair 1-2 hours for ADLs. 4. Patient/family to be independent with HEP for follow-up care and safe discharge. Outcome: Resolved/Met Date Met: 06/15/18  physical Therapy TREATMENT/DISCHARGE    Patient: Radha Conn (13 y.o. male)  Date: 6/15/2018  Diagnosis: LUMBAR STENOSIS  Lumbar stenosis with neurogenic claudication <principal problem not specified>  Procedure(s) (LRB):  DECOMPRESSIVE LUMBAR LAMINECTOMY (L4-L5-S1),(ALMARAZ PROCEDURE AT L5-S1) AUTOLOGOUS BONE, TLIF CAGE AND PEDICLE SCREWS WITH C-ARM,  (N/A) 2 Days Post-Op  Precautions: Fall, Back, Spinal  Chart, physical therapy assessment, plan of care and goals were reviewed. PLOF: independent, ambulatory without AD, RW delivered  ASSESSMENT:  Increased time needed to carry out log roll and to sit up at EOB. Donned LSO in sitting. Ambulated 280ft with RW, reciprocal gt pattern, steady pace, no LOB or path deviations. Stair negation goal met yesterday. Pt left sitting in chair. Instructed pt in gentle nerve glides for (B)LEs in chair due to reports of tightness in hamstrings. Instructed to ambulate more often to facilitate bowels. EDUCATION log roll, lumbar precautions, ambulate  Progression toward goals:  [x]      Goals met  []      Improving appropriately and progressing toward goals  []      Improving slowly and progressing toward goals  []      Not making progress toward goals and plan of care will be adjusted     PLAN:  Patient will be discharged from physical therapy at this time.   Rationale for discharge:  [x] Goals Achieved  [] [address]  [] Patient not participating in therapy  [] Other:  Discharge Recommendations:  Home Health  Further Equipment Recommendations for Discharge:  RW delivered and adjusted     SUBJECTIVE:   Patient stated My stomach and back hurt.     OBJECTIVE DATA SUMMARY:     G CODES: n/a     Critical Behavior:  Neurologic State: Alert  Orientation Level: Oriented X4  Cognition: Follows commands  Safety/Judgement: Awareness of environment  Functional Mobility Training:  Bed Mobility:  Rolling: Modified independent  Supine to Sit: Contact guard assistance; Additional time  Transfers:  Sit to Stand: Supervision  Stand to Sit: Supervision  Balance:  Sitting: Intact  Standing: Intact; With support  Standing - Static: Good  Standing - Dynamic : Fair (+)  Ambulation/Gait Training:  Distance (ft): 280 Feet (ft)  Assistive Device: Brace/Splint;Gait belt;Walker, rolling  Ambulation - Level of Assistance: Modified independent  Gait Abnormalities: Antalgic  Pain:  Pre treatment pain:  3  Post treatment pain:  3  Pain Scale 1: Numeric (0 - 10)  Pain Intensity 1: 3  Pain Location 1: Back  Pain Orientation 1: Lower  Pain Description 1: Aching    Activity Tolerance:   Good  Please refer to the flowsheet for vital signs taken during this treatment.   After treatment:   [x] Patient left in no apparent distress sitting up in chair  [] Patient left in no apparent distress in bed  [x] Call bell left within reach  [x] Nursing notified  [x] Caregiver present  [] Bed alarm activated  Fausto Robertson PTA   Time Calculation: 19 mins

## 2018-06-15 NOTE — DISCHARGE SUMMARY
Discharge Summary     PATIENT ID:  Vibha Scott  65 y.o.  1955    PHYSICIAN:  Sierra Gillis MD, MD .  ADMIT DATE: 6/13/2018   DISCHARGE DATE:  6=15-18      DISCHARGE DIAGNOSIS:  Lumbar stenosis with neurogenic clsudication    OPERATIVE PROCEDURES:  Lumbar laminectomy with fusion L4-5 L5-S1        HOSPITAL COURSE:  Tolerated the operative procedure well and was taken to PACU and then to the floor. As of d/c the patient is ambulating, tolerating p.o., and voiding without difficulty. PHYSICAL EXAM:  Visit Vitals    /72 (BP 1 Location: Left arm, BP Patient Position: At rest)    Pulse 69    Temp 98.7 °F (37.1 °C)    Resp 16    Ht 6' (1.829 m)    Wt 120.7 kg (266 lb 3 oz)    SpO2 96%    BMI 36.1 kg/m2     Alert and appropriate. Motor and sensory function are grossly intact. The wound is clean/dry/intact and flat. RELAVENT LABS (within last 72 hrs):    Cbc, chem profile      CONDITION AT DISCHARGE:   Neurologically stable, ambulating, taking PO medications. Current Discharge Medication List      START taking these medications    Details   morphine CR (MS CONTIN) 60 mg CR tablet Take 1 Tab by mouth every twelve (12) hours. Max Daily Amount: 120 mg.  Qty: 30 Tab, Refills: 0    Associated Diagnoses: Lumbar stenosis with neurogenic claudication         CONTINUE these medications which have CHANGED    Details   oxyCODONE-acetaminophen (PERCOCET) 5-325 mg per tablet Take 1 Tab by mouth every four (4) hours as needed. Max Daily Amount: 6 Tabs. Qty: 60 Tab, Refills: 0    Associated Diagnoses: Lumbar stenosis with neurogenic claudication         CONTINUE these medications which have NOT CHANGED    Details   lisinopril (PRINIVIL, ZESTRIL) 20 mg tablet Take 20 mg by mouth nightly. metFORMIN (GLUCOPHAGE) 500 mg tablet Take 1,000 mg by mouth two (2) times daily (with meals). simvastatin (ZOCOR) 20 mg tablet Take  by mouth nightly.       SITagliptin (JANUVIA) 50 mg tablet Take 50 mg by mouth daily. STOP taking these medications       traMADol (ULTRAM) 50 mg tablet Comments:   Reason for Stopping:               DISPOSITION:  Home   -F/u in 3 weeks (the patient should call [phone] for the appointment)   -Activity instructions have been given in the office and by nursing.     CONSULTANTS:  [unfilled]        PCP:  Zhang Suarez MD, MD    DISCHARGING PHYSICIAN: Elma Canas MD, MD

## 2018-06-16 ENCOUNTER — APPOINTMENT (OUTPATIENT)
Dept: CT IMAGING | Age: 63
DRG: 455 | End: 2018-06-16
Attending: HOSPITALIST
Payer: COMMERCIAL

## 2018-06-16 ENCOUNTER — APPOINTMENT (OUTPATIENT)
Dept: GENERAL RADIOLOGY | Age: 63
DRG: 455 | End: 2018-06-16
Attending: HOSPITALIST
Payer: COMMERCIAL

## 2018-06-16 PROBLEM — K59.00 CONSTIPATED: Status: ACTIVE | Noted: 2018-06-16

## 2018-06-16 LAB
CREAT SERPL-MCNC: 1.03 MG/DL (ref 0.6–1.3)
GLUCOSE BLD STRIP.AUTO-MCNC: 131 MG/DL (ref 70–110)
GLUCOSE BLD STRIP.AUTO-MCNC: 147 MG/DL (ref 70–110)
GLUCOSE BLD STRIP.AUTO-MCNC: 155 MG/DL (ref 70–110)
GLUCOSE BLD STRIP.AUTO-MCNC: 169 MG/DL (ref 70–110)

## 2018-06-16 PROCEDURE — 74178 CT ABD&PLV WO CNTR FLWD CNTR: CPT

## 2018-06-16 PROCEDURE — 74018 RADEX ABDOMEN 1 VIEW: CPT

## 2018-06-16 PROCEDURE — 82962 GLUCOSE BLOOD TEST: CPT

## 2018-06-16 PROCEDURE — 36415 COLL VENOUS BLD VENIPUNCTURE: CPT | Performed by: HOSPITALIST

## 2018-06-16 PROCEDURE — 74011250637 HC RX REV CODE- 250/637: Performed by: HOSPITALIST

## 2018-06-16 PROCEDURE — 82565 ASSAY OF CREATININE: CPT | Performed by: HOSPITALIST

## 2018-06-16 PROCEDURE — 74011636320 HC RX REV CODE- 636/320: Performed by: HOSPITALIST

## 2018-06-16 PROCEDURE — 74011636637 HC RX REV CODE- 636/637: Performed by: HOSPITALIST

## 2018-06-16 PROCEDURE — 74011250637 HC RX REV CODE- 250/637: Performed by: SPECIALIST

## 2018-06-16 PROCEDURE — 74011636637 HC RX REV CODE- 636/637: Performed by: INTERNAL MEDICINE

## 2018-06-16 PROCEDURE — 74011636320 HC RX REV CODE- 636/320: Performed by: SPECIALIST

## 2018-06-16 PROCEDURE — 65270000029 HC RM PRIVATE

## 2018-06-16 RX ORDER — LUBIPROSTONE 8 UG/1
8 CAPSULE, GELATIN COATED ORAL 2 TIMES DAILY WITH MEALS
Status: DISCONTINUED | OUTPATIENT
Start: 2018-06-16 | End: 2018-06-17 | Stop reason: HOSPADM

## 2018-06-16 RX ORDER — LUBIPROSTONE 8 UG/1
8 CAPSULE, GELATIN COATED ORAL 2 TIMES DAILY WITH MEALS
Status: DISCONTINUED | OUTPATIENT
Start: 2018-06-16 | End: 2018-06-16

## 2018-06-16 RX ORDER — LUBIPROSTONE 8 UG/1
8 CAPSULE, GELATIN COATED ORAL 2 TIMES DAILY WITH MEALS
Qty: 10 CAP | Refills: 0 | Status: SHIPPED | OUTPATIENT
Start: 2018-06-16 | End: 2020-05-08

## 2018-06-16 RX ORDER — POLYETHYLENE GLYCOL 3350 17 G/17G
17 POWDER, FOR SOLUTION ORAL ONCE
Status: COMPLETED | OUTPATIENT
Start: 2018-06-16 | End: 2018-06-16

## 2018-06-16 RX ADMIN — MORPHINE SULFATE 30 MG: 30 TABLET, EXTENDED RELEASE ORAL at 09:52

## 2018-06-16 RX ADMIN — INSULIN LISPRO 3 UNITS: 100 INJECTION, SOLUTION INTRAVENOUS; SUBCUTANEOUS at 17:02

## 2018-06-16 RX ADMIN — DOCUSATE SODIUM 100 MG: 100 CAPSULE, LIQUID FILLED ORAL at 09:52

## 2018-06-16 RX ADMIN — INSULIN LISPRO 2 UNITS: 100 INJECTION, SOLUTION INTRAVENOUS; SUBCUTANEOUS at 08:25

## 2018-06-16 RX ADMIN — IOPAMIDOL 100 ML: 612 INJECTION, SOLUTION INTRAVENOUS at 22:34

## 2018-06-16 RX ADMIN — INSULIN GLARGINE 15 UNITS: 100 INJECTION, SOLUTION SUBCUTANEOUS at 23:08

## 2018-06-16 RX ADMIN — BISACODYL 10 MG: 10 SUPPOSITORY RECTAL at 07:19

## 2018-06-16 RX ADMIN — OXYCODONE HYDROCHLORIDE AND ACETAMINOPHEN 2 TABLET: 5; 325 TABLET ORAL at 17:06

## 2018-06-16 RX ADMIN — DOCUSATE SODIUM 100 MG: 100 CAPSULE, LIQUID FILLED ORAL at 23:08

## 2018-06-16 RX ADMIN — LUBIPROSTONE 8 MCG: 8 CAPSULE, GELATIN COATED ORAL at 17:03

## 2018-06-16 RX ADMIN — INSULIN LISPRO 10 UNITS: 100 INJECTION, SOLUTION INTRAVENOUS; SUBCUTANEOUS at 08:25

## 2018-06-16 RX ADMIN — DIATRIZOATE MEGLUMINE AND DIATRIZOATE SODIUM 30 ML: 600; 100 SOLUTION ORAL; RECTAL at 20:16

## 2018-06-16 RX ADMIN — LISINOPRIL 20 MG: 20 TABLET ORAL at 23:08

## 2018-06-16 RX ADMIN — LACTULOSE 30 G: 20 SOLUTION ORAL at 13:35

## 2018-06-16 RX ADMIN — SIMVASTATIN 20 MG: 20 TABLET, FILM COATED ORAL at 23:08

## 2018-06-16 RX ADMIN — POLYETHYLENE GLYCOL 3350 17 G: 17 POWDER, FOR SOLUTION ORAL at 13:36

## 2018-06-16 RX ADMIN — OXYCODONE HYDROCHLORIDE AND ACETAMINOPHEN 2 TABLET: 5; 325 TABLET ORAL at 21:43

## 2018-06-16 RX ADMIN — LACTULOSE 30 G: 20 SOLUTION ORAL at 23:09

## 2018-06-16 RX ADMIN — LACTULOSE 30 G: 20 SOLUTION ORAL at 09:52

## 2018-06-16 RX ADMIN — MORPHINE SULFATE 30 MG: 30 TABLET, EXTENDED RELEASE ORAL at 23:08

## 2018-06-16 RX ADMIN — LUBIPROSTONE 8 MCG: 8 CAPSULE, GELATIN COATED ORAL at 09:52

## 2018-06-16 RX ADMIN — OXYCODONE HYDROCHLORIDE AND ACETAMINOPHEN 2 TABLET: 5; 325 TABLET ORAL at 11:08

## 2018-06-16 RX ADMIN — OXYCODONE HYDROCHLORIDE AND ACETAMINOPHEN 2 TABLET: 5; 325 TABLET ORAL at 07:16

## 2018-06-16 NOTE — PROGRESS NOTES
2004  Bedside and Verbal shift change report given to JAZMIN Benites RN by Jono Almendarez RN. Report included the following information SBAR, Kardex, OR Summary, Intake/Output and MAR.

## 2018-06-16 NOTE — ROUTINE PROCESS
Bedside and Verbal shift change report given to Andi Arevalo RN (oncoming nurse) by JAZMIN Puente RN (offgoing nurse). Report included the following information SBAR, OR Summary, Intake/Output and MAR.

## 2018-06-16 NOTE — PROGRESS NOTES
Problem: Falls - Risk of  Goal: *Absence of Falls  Document Carlton Fall Risk and appropriate interventions in the flowsheet.    Outcome: Progressing Towards Goal  Fall Risk Interventions:  Mobility Interventions: Patient to call before getting OOB    Mentation Interventions: Adequate sleep, hydration, pain control    Medication Interventions: Patient to call before getting OOB    Elimination Interventions: Patient to call for help with toileting needs

## 2018-06-16 NOTE — ROUTINE PROCESS
2486 Bedside report received from [address] [place] [address] Avenue [address]., RN. Patient in bathroom at this time. Care of pt assumed.

## 2018-06-16 NOTE — PROGRESS NOTES
5399  Pt is awake, alert, oriented x4. Sitting on chair with back brace on. Dulcolax supp 1 VT given by Fabrice Olivas, RN this morning. No result yet. Pt able to eat 50% of his food and drank coffee and warm prune juice this morning. 9582   Pt was seen by dr Edgar Salazar this morning. Ordered lactulose, Amitiza and abd xray . 0845  Pt ambulated with wife in the hallway then sat on chair. 8765  Lungs are clear. Abdomen obese with hyperactive BS. 100  Pt had Portable abd Xray done and took Amitiza and Lactulose as ordered. 11:10 AM.   Medicated with Percocet 2 tabs po for pain level 4/10.l Pt ambulated to hallway with wife then sat in toilet bowl. 11:46 AM  Pt had scant amt of stools and passed out gas. Pt wants to go home. Dr Edgar Salazar is aware. As per MD, pt can go home. MD will prescribe amitiza  And send it to his Pharmacy on file. 1215  Pt had another scant amt of stools, yellow fluids and small specks of stools. 1:40 PM   Dr Edgar Salazar in to see pt. Will hold off d/c for now. Lactulose 30 gm po and Miralax 1 packet given. 4:04 PM   No further bowel movement noted. Dr Edgar Salazar is aware. As per MD, pt not going home. Ordered CT of abdomen/Pelvis with and without contrast. IV INT started on LFA g18.   4:45 PM Ct scan tech was made aware of order. Requested lab to be drwn for creatinine and GRF. Order placed and lab was notified.

## 2018-06-16 NOTE — PROGRESS NOTES
2003 - Assumed care of pt at this time. Pt in on bsc with no signs of distress. Pt left with call light within reach and encouraged to call for assistance. 2015 -  Soap suds enema applied and patient had a bm that was very watery and brown in color. 2115 - Soap suds enema applied and patient had a bm that was liquid but less watery than the last output. 2230 - Soap suds enema applied. Pt still has liquid stool. Pt asked to rest. Bowel sounds are hyperactive in all quadrants. Pt still complained of feeling pressure to the right side of abdomen. Will continue to monitor patient. Shift summary - Pt pain managed with prn medication per MAR. Pt informed about all medications and side effects and encouraged to ask questions about medication. Pt encouraged throughout the night to use incentive spirometer and the purpose of the incentive spirometer. Pt continued to have difficulty having bowel movements. Pt left with no signs of distress and any concerns of pt addressed.

## 2018-06-16 NOTE — ROUTINE PROCESS
5:25 PM   Bedside and Verbal shift change report given to Rex Hester RN (oncoming nurse) by Ashley Khoury RN (offgoing nurse). Report included the following information SBAR, Kardex and MAR.

## 2018-06-16 NOTE — PROGRESS NOTES
1940 Assumed care of patient from Isabelle Bloch, RN. Shift assessment initiated. Pain voiced at 3/10, will medicated per [facility] at appropriate time. All questions and concerns answered. All needs met. Bed in the lowest position. Call bell/phone within reach. No signs of distress noted. IS encouraged. No further needs at this time. Will continue to monitor for changes. 2022 Assessment complete. No needs. Patient drinking Gastroview for ordered CT scan. 2143 Pain voiced at 4/10, medicated per MAR. No needs at this time. 2210 Patient off the floor for CT scan of abdomen. 2308 Scheduled medications given. No further needs at this time. Will continue to monitor for changes. 0145 Pain voiced at 2/10, medicated per MAR to maintain pain control and assist w/ ambulation throughout the night. Bedside and Verbal shift change report given to NILA Prieto RN (oncoming nurse) by BETTY Alexandra Rn (offgoing nurse). Report included the following information SBAR, Kardex, MAR, Recent Results and Med Rec Status.

## 2018-06-16 NOTE — PROGRESS NOTES
Hospitalist Progress Note    Patient: Eleonora Santiago MRN: [id number]  CSN: [id number]    YOB: 1955  Age: 65 y.o.   Sex: male    DOA: 6/13/2018 LOS:  LOS: 3 days          Repeat xray shows improved right colon distention and constipation  He can discharge if he prefers to go home, I will call in [address] [place] [address] for him for a few days and he needs to continue stool softeners and miralax OTC    Discussed with nurse    Addendum: 1 pm:He still feels uncomfortable  I told him we could dose the lactulose and amitiza now, he should walk again, and we can reassess his comfort on going home later today    The xray did look much better-he may pass liquid stool for a few days    4 pm:no BM, ate nothing for lunch  Im afraid he will not last long at home with his discomfort and thus I want to make sure there is nothing else going on, so will order a CT scan abd stat    Discussed with wife and pt again    Yuli Fisher MD

## 2018-06-16 NOTE — PROGRESS NOTES
1809  Jon-PA verbal ordered soap edema every hour until pt has a solid stool   1915  First soap edema admit, pt in bed lying on left side   1940  Pt had greenish liquid output with few pieces of BM  2015  Second soap edema admit, pt in bed lying on left side

## 2018-06-16 NOTE — PROGRESS NOTES
Hospitalist Progress Note    Patient: Manish Forte MRN: [id number]  CSN: [id number]    YOB: 1955  Age: 65 y.o.   Sex: male    DOA: 6/13/2018 LOS:  LOS: 3 days          Chief Complaint:    constipation      Assessment/Plan     Constipation-large volume formed stool right colon on xray-despite soap suds, miralax, and mag citrate he has not had notable movement-he has good normal BS, no abd pain on palpation and no nausea or vomiting    Recommend start amitiza, lactulose, he will walk in halls, and we can re-xray to see if stool moving-if he continues with this issue, and cannot move bowels we may have to try another angle    I do not believe he is in acute distress nor having an obstruction-we will follow his course today    IDDM  S/p back surgery    BG are stable      Disposition :  Patient Active Problem List   Diagnosis Code    Hematuria R31.9    Renal calculus N20.0    Right flank pain R10.9    Cervical spondylosis with radiculopathy M47.22    Pain at surgical incision L76.82    Lumbar stenosis with neurogenic claudication M48.062       Subjective:  Still constipated  Small liquisd stool with enema last night, but no formed stool  abd discomfort level 2, no nausea or vomiting      Review of systems:    Constitutional: denies fevers, chills, myalgias  Respiratory: denies SOB, cough  Cardiovascular: denies chest pain, palpitations        Vital signs/Intake and Output:  Visit Vitals    /75    Pulse 82    Temp 99.1 °F (37.3 °C)    Resp 17    Ht 6' (1.829 m)    Wt 120.7 kg (266 lb 3 oz)    SpO2 96%    BMI 36.1 kg/m2     Current Shift:  06/16 0701 - 06/16 1900  In: -   Out: 450 [Urine:450]  Last three shifts:  06/14 1901 - 06/16 0700  In: 1270 [P.O.:1270]  Out: 2225 [Urine:2225]    Exam:    General: Well developed, alert, NAD, OX3  Head/Neck: NCAT, supple, No masses, No lymphadenopathy  CVS:Regular rate and rhythm, no M/R/G, S1/S2 heard, no thrill  Lungs:Clear to auscultation bilaterally, no wheezes, rhonchi, or rales  Abdomen: Soft, Nontender, No distention, Normal very active Bowel sounds, No hepatomegaly  Extremities: No C/C/E, pulses palpable 2+  Neuro:grossly normal , follows commands  Psych:appropriate                Labs: Results:       Chemistry Recent Labs      06/15/18   0738   GLU  188*   NA  139   K  4.0   CL  102   CO2  28   BUN  17   CREA  1.16   CA  8.3*   AGAP  9   BUCR  15      CBC w/Diff Recent Labs      06/15/18   0738  06/14/18   0310   WBC  13.9*  11.0   RBC  4.13*  4.06*   HGB  11.9*  11.6*   HCT  36.9  35.8*   PLT  161  150   GRANS  67   --    LYMPH  22   --    EOS  0   --       Cardiac Enzymes No results for input(s): CPK, CKND1, GRECIA in the last 72 hours. No lab exists for component: CKRMB, TROIP   Coagulation No results for input(s): PTP, INR, APTT in the last 72 hours. No lab exists for component: INREXT    Lipid Panel No results found for: CHOL, CHOLPOCT, CHOLX, CHLST, CHOLV, 003739, HDL, LDL, LDLC, DLDLP, 287169, VLDLC, VLDL, TGLX, TRIGL, TRIGP, TGLPOCT, CHHD, CHHDX   BNP No results for input(s): BNPP in the last 72 hours. Liver Enzymes No results for input(s): TP, ALB, TBIL, AP, SGOT, GPT in the last 72 hours.     No lab exists for component: DBIL   Thyroid Studies No results found for: T4, T3U, TSH, TSHEXT     Procedures/imaging: see electronic medical records for all procedures/Xrays and details which were not copied into this note but were reviewed prior to creation of Ford Ramos MD

## 2018-06-17 ENCOUNTER — HOME CARE VISIT (OUTPATIENT)
Dept: HOME HEALTH SERVICES | Facility: HOME HEALTH | Age: 63
End: 2018-06-17

## 2018-06-17 VITALS
BODY MASS INDEX: 36.05 KG/M2 | HEART RATE: 76 BPM | SYSTOLIC BLOOD PRESSURE: 141 MMHG | OXYGEN SATURATION: 96 % | HEIGHT: 72 IN | TEMPERATURE: 98 F | RESPIRATION RATE: 17 BRPM | DIASTOLIC BLOOD PRESSURE: 82 MMHG | WEIGHT: 266.19 LBS

## 2018-06-17 LAB — GLUCOSE BLD STRIP.AUTO-MCNC: 143 MG/DL (ref 70–110)

## 2018-06-17 PROCEDURE — 74011250637 HC RX REV CODE- 250/637: Performed by: HOSPITALIST

## 2018-06-17 PROCEDURE — 74011636637 HC RX REV CODE- 636/637: Performed by: HOSPITALIST

## 2018-06-17 PROCEDURE — 77030037875 HC DRSG MEPILEX <16IN BORD MOLN -A

## 2018-06-17 PROCEDURE — 82962 GLUCOSE BLOOD TEST: CPT

## 2018-06-17 PROCEDURE — 74011250637 HC RX REV CODE- 250/637: Performed by: SPECIALIST

## 2018-06-17 RX ADMIN — LACTULOSE 30 G: 20 SOLUTION ORAL at 09:42

## 2018-06-17 RX ADMIN — MORPHINE SULFATE 30 MG: 30 TABLET, EXTENDED RELEASE ORAL at 08:24

## 2018-06-17 RX ADMIN — OXYCODONE HYDROCHLORIDE AND ACETAMINOPHEN 2 TABLET: 5; 325 TABLET ORAL at 09:55

## 2018-06-17 RX ADMIN — LUBIPROSTONE 8 MCG: 8 CAPSULE, GELATIN COATED ORAL at 08:28

## 2018-06-17 RX ADMIN — INSULIN LISPRO 10 UNITS: 100 INJECTION, SOLUTION INTRAVENOUS; SUBCUTANEOUS at 08:24

## 2018-06-17 RX ADMIN — OXYCODONE HYDROCHLORIDE AND ACETAMINOPHEN 2 TABLET: 5; 325 TABLET ORAL at 01:51

## 2018-06-17 RX ADMIN — OXYCODONE HYDROCHLORIDE AND ACETAMINOPHEN 2 TABLET: 5; 325 TABLET ORAL at 06:15

## 2018-06-17 RX ADMIN — DOCUSATE SODIUM 100 MG: 100 CAPSULE, LIQUID FILLED ORAL at 08:24

## 2018-06-17 NOTE — PROGRESS NOTES
Clear lung sounds, no signs of distress. No complaints of pain, pt awaiting CT scan and expecting possible discharge home tonight. Call light in reach.

## 2018-06-17 NOTE — DISCHARGE INSTRUCTIONS
DISCHARGE SUMMARY from Nurse    PATIENT INSTRUCTIONS:    After general anesthesia or intravenous sedation, for 24 hours or while taking prescription Narcotics:  · Limit your activities  · Do not drive and operate hazardous machinery  · Do not make important personal or business decisions  · Do  not drink alcoholic beverages  · If you have not urinated within 8 hours after discharge, please contact your surgeon on call. Report the following to your surgeon:  · Excessive pain, swelling, redness or odor of or around the surgical area  · Temperature over 100.5  · Nausea and vomiting lasting longer than 4 hours or if unable to take medications  · Any signs of decreased circulation or nerve impairment to extremity: change in color, persistent  numbness, tingling, coldness or increase pain  · Any questions    What to do at Home:  Recommended activity: Activity as tolerated    *  Please give a list of your current medications to your Primary Care Provider. *  Please update this list whenever your medications are discontinued, doses are      changed, or new medications (including over-the-counter products) are added. *  Please carry medication information at all times in case of emergency situations. These are general instructions for a healthy lifestyle:    No smoking/ No tobacco products/ Avoid exposure to second hand smoke  Surgeon General's Warning:  Quitting smoking now greatly reduces serious risk to your health. Obesity, smoking, and sedentary lifestyle greatly increases your risk for illness    A healthy diet, regular physical exercise & weight monitoring are important for maintaining a healthy lifestyle    You may be retaining fluid if you have a history of heart failure or if you experience any of the following symptoms:  Weight gain of 3 pounds or more overnight or 5 pounds in a week, increased swelling in our hands or feet or shortness of breath while lying flat in bed.   Please call your doctor as soon as you notice any of these symptoms; do not wait until your next office visit. Recognize signs and symptoms of STROKE:    F-face looks uneven    A-arms unable to move or move unevenly    S-speech slurred or non-existent    T-time-call 911 as soon as signs and symptoms begin-DO NOT go       Back to bed or wait to see if you get better-TIME IS BRAIN. Warning Signs of HEART ATTACK     Call 911 if you have these symptoms:   Chest discomfort. Most heart attacks involve discomfort in the center of the chest that lasts more than a few minutes, or that goes away and comes back. It can feel like uncomfortable pressure, squeezing, fullness, or pain.  Discomfort in other areas of the upper body. Symptoms can include pain or discomfort in one or both arms, the back, neck, jaw, or stomach.  Shortness of breath with or without chest discomfort.  Other signs may include breaking out in a cold sweat, nausea, or lightheadedness. Don't wait more than five minutes to call 911 - MINUTES MATTER! Fast action can save your life. Calling 911 is almost always the fastest way to get lifesaving treatment. Emergency Medical Services staff can begin treatment when they arrive -- up to an hour sooner than if someone gets to the hospital by car. The discharge information has been reviewed with the patient. The patient verbalized understanding. Discharge medications reviewed with the patient and appropriate educational materials and side effects teaching were provided. ___________________________________________________________________________________________________________________________________            Lab Results   Component Value Date/Time    Hemoglobin A1c 9.3 (H) 06/11/2018 09:13 AM       This lab test reflects that your blood sugar has been slightly elevated over the past 3 months and should be evaluated by your primary care provider.  An A1C of 5.7-6.4% meets the criteria for pre-diabetes; an A1C of 6.5% or higher meets the criteria for diabetes. Steroids can increase your blood sugar so it is important to follow up with your provider to determine if your blood sugar has returned to normal or needs further treatment. This lab test reflects that your blood sugar averaged 220 mg/dL over the past 3 months. It is important to follow up with your provider on a routine basis to continue to evaluate your blood sugar and discuss any necessary changes in treatment. Constipation: Care Instructions  Your Care Instructions    Constipation means that you have a hard time passing stools (bowel movements). People pass stools from 3 times a day to once every 3 days. What is normal for you may be different. Constipation may occur with pain in the rectum and cramping. The pain may get worse when you try to pass stools. Sometimes there are small amounts of bright red blood on toilet paper or the surface of stools. This is because of enlarged veins near the rectum (hemorrhoids). A few changes in your diet and lifestyle may help you avoid ongoing constipation. Your doctor may also prescribe medicine to help loosen your stool. Some medicines can cause constipation. These include pain medicines and antidepressants. Tell your doctor about all the medicines you take. Your doctor may want to make a medicine change to ease your symptoms. Follow-up care is a key part of your treatment and safety. Be sure to make and go to all appointments, and call your doctor if you are having problems. It's also a good idea to know your test results and keep a list of the medicines you take. How can you care for yourself at home? · Drink plenty of fluids, enough so that your urine is light yellow or clear like water. If you have kidney, heart, or liver disease and have to limit fluids, talk with your doctor before you increase the amount of fluids you drink. · Include high-fiber foods in your diet each day.  These include fruits, vegetables, beans, and whole grains. · Get at least 30 minutes of exercise on most days of the week. Walking is a good choice. You also may want to do other activities, such as running, swimming, cycling, or playing tennis or team sports. · Take a fiber supplement, such as Citrucel or Metamucil, every day. Read and follow all instructions on the label. · Schedule time each day for a bowel movement. A daily routine may help. Take your time having your bowel movement. · Support your feet with a small step stool when you sit on the toilet. This helps flex your hips and places your pelvis in a squatting position. · Your doctor may recommend an over-the-counter laxative to relieve your constipation. Examples are Milk of Magnesia and MiraLax. Read and follow all instructions on the label. Do not use laxatives on a long-term basis. When should you call for help? Call your doctor now or seek immediate medical care if:  ? · You have new or worse belly pain. ? · You have new or worse nausea or vomiting. ? · You have blood in your stools. ? Watch closely for changes in your health, and be sure to contact your doctor if:  ? · Your constipation is getting worse. ? · You do not get better as expected. Where can you learn more? Go to http://neida-maryuri.info/. Enter 21 [phone] in the search box to learn more about \"Constipation: Care Instructions. \"  Current as of: March 20, 2017  Content Version: 11.4  © 0491-1261 [employer], [employer]. Care instructions adapted under license by [employer] (which disclaims liability or warranty for this information). If you have questions about a medical condition or this instruction, always ask your healthcare professional. [place] [address] any warranty or liability for your use of this information.

## 2018-06-17 NOTE — PROGRESS NOTES
Hospitalist Progress Note    Patient: Shahid Hanley MRN: [id number]  CSN: [id number]    YOB: 1955  Age: 65 y.o.   Sex: male    DOA: 6/13/2018 LOS:  LOS: 4 days          Chief Complaint:    Constipation and abd pain      Assessment/Plan     Constipation-he has had liquid stol-the CT abd revealed NO acute abnormality nor constipation    S/p back surgery for lumbar stenosis with claudication  IDDM    He can go home this am  He  Is otherwise doing well  I encouraged walking and to pass gas if possible, and to stay on a stool sofetenr and hi fiber diet    He is nontoxic, trying to eat some eggs this am, he has had no vomiting    VS are stable    Discussed d/c wit nurse  Disposition :  Patient Active Problem List   Diagnosis Code    Hematuria R31.9    Renal calculus N20.0    Right flank pain R10.9    Cervical spondylosis with radiculopathy M47.22    Pain at surgical incision L76.82    Lumbar stenosis with neurogenic claudication M48.062    Constipated K59.00    Insulin dependent diabetes mellitus ([facility] [address] E11.9, Z79.4       Subjective:    Liquid stool, no bleeding  Some gas like pain right abdomen  No N/V    Review of systems:    Constitutional: denies fevers, chills, myalgias  Respiratory: denies SOB, cough  Cardiovascular: denies chest pain, palpitations  Gastrointestinal: denies nausea, vomiting      Vital signs/Intake and Output:  Visit Vitals    /82    Pulse 76    Temp 98 °F (36.7 °C)    Resp 17    Ht 6' (1.829 m)    Wt 120.7 kg (266 lb 3 oz)    SpO2 96%    BMI 36.1 kg/m2     Current Shift:  06/17 0701 - 06/17 1900  In: 180 [P.O.:180]  Out: -   Last three shifts:  06/15 1901 - 06/17 0700  In: 550 [P.O.:550]  Out: 1375 [Urine:1375]    Exam:    General: Well developed, alert, NAD, OX3, up in chair with back brace  Head/Neck: NCAT, supple, No masses, No lymphadenopathy  Abdomen: Soft, Nontender, No distention  Extremities: No C/C/E, pulses palpable 2+  Neuro:grossly normal , follows commands  Psych:appropriate                Labs: Results:       Chemistry Recent Labs      06/16/18   1726  06/15/18   0738   GLU   --   188*   NA   --   139   K   --   4.0   CL   --   102   CO2   --   28   BUN   --   17   CREA  1.03  1.16   CA   --   8.3*   AGAP   --   9   BUCR   --   15      CBC w/Diff Recent Labs      06/15/18   0738   WBC  13.9*   RBC  4.13*   HGB  11.9*   HCT  36.9   PLT  161   GRANS  67   LYMPH  22   EOS  0      Cardiac Enzymes No results for input(s): CPK, CKND1, GRECIA in the last 72 hours. No lab exists for component: CKRMB, TROIP   Coagulation No results for input(s): PTP, INR, APTT in the last 72 hours. No lab exists for component: INREXT    Lipid Panel No results found for: CHOL, CHOLPOCT, CHOLX, CHLST, CHOLV, 577086, HDL, LDL, LDLC, DLDLP, 757499, VLDLC, VLDL, TGLX, TRIGL, TRIGP, TGLPOCT, CHHD, CHHDX   BNP No results for input(s): BNPP in the last 72 hours. Liver Enzymes No results for input(s): TP, ALB, TBIL, AP, SGOT, GPT in the last 72 hours.     No lab exists for component: DBIL   Thyroid Studies No results found for: T4, T3U, TSH, TSHEXT     Procedures/imaging: see electronic medical records for all procedures/Xrays and details which were not copied into this note but were reviewed prior to creation of Haim Magana MD

## 2018-06-17 NOTE — PROGRESS NOTES
9216-  - Patient sitting on side of bed at this time. A/O x 4. Lungs clear, radial pulses present , pedal pulses present , abdomen soft and non-distended. Bowel sounds active, slightly distended d/t constipation. 18 G IV to left arm  Intact, patent and capped at this time. No signs of phlebitis or infiltration noted. SCDs applied bilaterally. Skin warm and dry  with  mepilex dressing to lower back CDI, lifting up slightly on edges, new mepilex applied per wife request, incision has no drainage noted, surgical glue in place. Patient denies numbness/tingling. Pain 3/10. Bed placed in lowest position, call bell within reach. 3238- Pain 3/10, patient requested pain medication before discharge. PRN percocet 10 mg PO pain medication administered at this time. Patient has been educated on side effects. Side effect education sheets have been provided. 1000- This writer has reviewed discharge instructions with patient at this time. Patient has verbalized understanding. Patient was provided with care notes to include side effects of RX's. IV removed, patient tolerated well. Arm bands removed and shredded.

## 2018-06-17 NOTE — ROUTINE PROCESS
Bedside and Verbal shift change report given to Jennifer HERNÁNDEZ RN by Denise Chávez RN. Report included the following information SBAR, Kardex, OR Summary, Intake/Output and MAR.

## 2018-06-20 ENCOUNTER — HOME CARE VISIT (OUTPATIENT)
Dept: HOME HEALTH SERVICES | Facility: HOME HEALTH | Age: 63
End: 2018-06-20

## 2018-06-25 ENCOUNTER — HOME HEALTH ADMISSION (OUTPATIENT)
Dept: HOME HEALTH SERVICES | Facility: HOME HEALTH | Age: 63
End: 2018-06-25
Payer: COMMERCIAL

## 2018-06-26 ENCOUNTER — HOME CARE VISIT (OUTPATIENT)
Dept: SCHEDULING | Facility: HOME HEALTH | Age: 63
End: 2018-06-26
Payer: COMMERCIAL

## 2018-06-26 PROCEDURE — 400013 HH SOC

## 2018-06-26 PROCEDURE — G0299 HHS/HOSPICE OF RN EA 15 MIN: HCPCS

## 2018-06-27 ENCOUNTER — HOME CARE VISIT (OUTPATIENT)
Dept: SCHEDULING | Facility: HOME HEALTH | Age: 63
End: 2018-06-27
Payer: COMMERCIAL

## 2018-06-27 VITALS
HEART RATE: 84 BPM | TEMPERATURE: 97.6 F | DIASTOLIC BLOOD PRESSURE: 88 MMHG | SYSTOLIC BLOOD PRESSURE: 142 MMHG | OXYGEN SATURATION: 98 % | RESPIRATION RATE: 16 BRPM

## 2018-06-27 PROCEDURE — G0151 HHCP-SERV OF PT,EA 15 MIN: HCPCS

## 2018-06-28 ENCOUNTER — HOME CARE VISIT (OUTPATIENT)
Dept: HOME HEALTH SERVICES | Facility: HOME HEALTH | Age: 63
End: 2018-06-28
Payer: COMMERCIAL

## 2018-06-28 VITALS
HEART RATE: 91 BPM | OXYGEN SATURATION: 98 % | SYSTOLIC BLOOD PRESSURE: 143 MMHG | RESPIRATION RATE: 17 BRPM | DIASTOLIC BLOOD PRESSURE: 93 MMHG | TEMPERATURE: 97 F

## 2018-06-29 ENCOUNTER — APPOINTMENT (OUTPATIENT)
Dept: GENERAL RADIOLOGY | Age: 63
DRG: 690 | End: 2018-06-29
Attending: PHYSICIAN ASSISTANT
Payer: COMMERCIAL

## 2018-06-29 ENCOUNTER — HOSPITAL ENCOUNTER (INPATIENT)
Age: 63
LOS: 3 days | Discharge: HOME HEALTH | DRG: 690 | End: 2018-07-02
Attending: EMERGENCY MEDICINE | Admitting: INTERNAL MEDICINE
Payer: COMMERCIAL

## 2018-06-29 ENCOUNTER — APPOINTMENT (OUTPATIENT)
Dept: GENERAL RADIOLOGY | Age: 63
DRG: 690 | End: 2018-06-29
Attending: INTERNAL MEDICINE
Payer: COMMERCIAL

## 2018-06-29 ENCOUNTER — HOME CARE VISIT (OUTPATIENT)
Dept: HOME HEALTH SERVICES | Facility: HOME HEALTH | Age: 63
End: 2018-06-29
Payer: COMMERCIAL

## 2018-06-29 DIAGNOSIS — Z98.890 HISTORY OF SPINAL SURGERY: ICD-10-CM

## 2018-06-29 DIAGNOSIS — R50.9 FEVER IN ADULT: Primary | ICD-10-CM

## 2018-06-29 DIAGNOSIS — N12 PYELONEPHRITIS: ICD-10-CM

## 2018-06-29 PROBLEM — R33.9 URINARY RETENTION: Status: ACTIVE | Noted: 2018-06-29

## 2018-06-29 PROBLEM — M96.1 POST LAMINECTOMY SYNDROME: Status: ACTIVE | Noted: 2018-06-29

## 2018-06-29 PROBLEM — K59.00 CONSTIPATION: Status: ACTIVE | Noted: 2018-06-29

## 2018-06-29 PROBLEM — N39.0 UTI (URINARY TRACT INFECTION): Status: ACTIVE | Noted: 2018-06-29

## 2018-06-29 PROBLEM — E11.9 DM (DIABETES MELLITUS) (HCC): Status: ACTIVE | Noted: 2018-06-29

## 2018-06-29 LAB
ALBUMIN SERPL-MCNC: 3.2 G/DL (ref 3.4–5)
ALBUMIN/GLOB SERPL: 0.7 {RATIO} (ref 0.8–1.7)
ALP SERPL-CCNC: 75 U/L (ref 45–117)
ALT SERPL-CCNC: 17 U/L (ref 16–61)
ANION GAP SERPL CALC-SCNC: 9 MMOL/L (ref 3–18)
APPEARANCE UR: ABNORMAL
AST SERPL-CCNC: 13 U/L (ref 15–37)
BACTERIA URNS QL MICRO: ABNORMAL /HPF
BASOPHILS # BLD: 0 K/UL (ref 0–0.06)
BASOPHILS NFR BLD: 0 % (ref 0–2)
BILIRUB SERPL-MCNC: 1.1 MG/DL (ref 0.2–1)
BILIRUB UR QL: NEGATIVE
BUN SERPL-MCNC: 18 MG/DL (ref 7–18)
BUN/CREAT SERPL: 15 (ref 12–20)
CALCIUM SERPL-MCNC: 8.8 MG/DL (ref 8.5–10.1)
CHLORIDE SERPL-SCNC: 98 MMOL/L (ref 100–108)
CO2 SERPL-SCNC: 28 MMOL/L (ref 21–32)
COLOR UR: ABNORMAL
CREAT SERPL-MCNC: 1.19 MG/DL (ref 0.6–1.3)
DIFFERENTIAL METHOD BLD: ABNORMAL
EOSINOPHIL # BLD: 0 K/UL (ref 0–0.4)
EOSINOPHIL NFR BLD: 0 % (ref 0–5)
EPITH CASTS URNS QL MICRO: ABNORMAL /LPF (ref 0–5)
ERYTHROCYTE [DISTWIDTH] IN BLOOD BY AUTOMATED COUNT: 13 % (ref 11.6–14.5)
EST. AVERAGE GLUCOSE BLD GHB EST-MCNC: 206 MG/DL
GLOBULIN SER CALC-MCNC: 4.4 G/DL (ref 2–4)
GLUCOSE BLD STRIP.AUTO-MCNC: 135 MG/DL (ref 70–110)
GLUCOSE SERPL-MCNC: 169 MG/DL (ref 74–99)
GLUCOSE UR STRIP.AUTO-MCNC: NEGATIVE MG/DL
HBA1C MFR BLD: 8.8 % (ref 4.5–5.6)
HCT VFR BLD AUTO: 37.5 % (ref 36–48)
HGB BLD-MCNC: 12.2 G/DL (ref 13–16)
HGB UR QL STRIP: ABNORMAL
KETONES UR QL STRIP.AUTO: ABNORMAL MG/DL
LACTATE SERPL-SCNC: 1.3 MMOL/L (ref 0.4–2)
LEUKOCYTE ESTERASE UR QL STRIP.AUTO: ABNORMAL
LYMPHOCYTES # BLD: 1.8 K/UL (ref 0.9–3.6)
LYMPHOCYTES NFR BLD: 12 % (ref 21–52)
MCH RBC QN AUTO: 28.4 PG (ref 24–34)
MCHC RBC AUTO-ENTMCNC: 32.5 G/DL (ref 31–37)
MCV RBC AUTO: 87.2 FL (ref 74–97)
MONOCYTES # BLD: 1.4 K/UL (ref 0.05–1.2)
MONOCYTES NFR BLD: 9 % (ref 3–10)
NEUTS SEG # BLD: 11.2 K/UL (ref 1.8–8)
NEUTS SEG NFR BLD: 79 % (ref 40–73)
NITRITE UR QL STRIP.AUTO: POSITIVE
PH UR STRIP: 6 [PH] (ref 5–8)
PLATELET # BLD AUTO: 187 K/UL (ref 135–420)
PMV BLD AUTO: 11.1 FL (ref 9.2–11.8)
POTASSIUM SERPL-SCNC: 4.3 MMOL/L (ref 3.5–5.5)
PROT SERPL-MCNC: 7.6 G/DL (ref 6.4–8.2)
PROT UR STRIP-MCNC: 30 MG/DL
RBC # BLD AUTO: 4.3 M/UL (ref 4.7–5.5)
RBC #/AREA URNS HPF: ABNORMAL /HPF (ref 0–5)
SODIUM SERPL-SCNC: 135 MMOL/L (ref 136–145)
SP GR UR REFRACTOMETRY: 1.02 (ref 1–1.03)
UROBILINOGEN UR QL STRIP.AUTO: 1 EU/DL (ref 0.2–1)
WBC # BLD AUTO: 14.4 K/UL (ref 4.6–13.2)
WBC URNS QL MICRO: ABNORMAL /HPF (ref 0–5)

## 2018-06-29 PROCEDURE — 74011250636 HC RX REV CODE- 250/636: Performed by: PHYSICIAN ASSISTANT

## 2018-06-29 PROCEDURE — 87077 CULTURE AEROBIC IDENTIFY: CPT | Performed by: PHYSICIAN ASSISTANT

## 2018-06-29 PROCEDURE — 82962 GLUCOSE BLOOD TEST: CPT

## 2018-06-29 PROCEDURE — 87086 URINE CULTURE/COLONY COUNT: CPT | Performed by: PHYSICIAN ASSISTANT

## 2018-06-29 PROCEDURE — 87186 SC STD MICRODIL/AGAR DIL: CPT | Performed by: PHYSICIAN ASSISTANT

## 2018-06-29 PROCEDURE — 74022 RADEX COMPL AQT ABD SERIES: CPT

## 2018-06-29 PROCEDURE — 65270000029 HC RM PRIVATE

## 2018-06-29 PROCEDURE — 83036 HEMOGLOBIN GLYCOSYLATED A1C: CPT | Performed by: INTERNAL MEDICINE

## 2018-06-29 PROCEDURE — 74011250636 HC RX REV CODE- 250/636: Performed by: INTERNAL MEDICINE

## 2018-06-29 PROCEDURE — 87040 BLOOD CULTURE FOR BACTERIA: CPT | Performed by: PHYSICIAN ASSISTANT

## 2018-06-29 PROCEDURE — 81001 URINALYSIS AUTO W/SCOPE: CPT | Performed by: PHYSICIAN ASSISTANT

## 2018-06-29 PROCEDURE — 83605 ASSAY OF LACTIC ACID: CPT | Performed by: INTERNAL MEDICINE

## 2018-06-29 PROCEDURE — 93005 ELECTROCARDIOGRAM TRACING: CPT

## 2018-06-29 PROCEDURE — 99285 EMERGENCY DEPT VISIT HI MDM: CPT

## 2018-06-29 PROCEDURE — 71045 X-RAY EXAM CHEST 1 VIEW: CPT

## 2018-06-29 PROCEDURE — 74011250637 HC RX REV CODE- 250/637: Performed by: INTERNAL MEDICINE

## 2018-06-29 PROCEDURE — 74011250637 HC RX REV CODE- 250/637: Performed by: PHYSICIAN ASSISTANT

## 2018-06-29 PROCEDURE — 83605 ASSAY OF LACTIC ACID: CPT | Performed by: PHYSICIAN ASSISTANT

## 2018-06-29 PROCEDURE — 85025 COMPLETE CBC W/AUTO DIFF WBC: CPT | Performed by: PHYSICIAN ASSISTANT

## 2018-06-29 PROCEDURE — 80053 COMPREHEN METABOLIC PANEL: CPT | Performed by: PHYSICIAN ASSISTANT

## 2018-06-29 PROCEDURE — 96360 HYDRATION IV INFUSION INIT: CPT

## 2018-06-29 RX ORDER — ONDANSETRON 2 MG/ML
4 INJECTION INTRAMUSCULAR; INTRAVENOUS
Status: DISCONTINUED | OUTPATIENT
Start: 2018-06-29 | End: 2018-07-02 | Stop reason: HOSPADM

## 2018-06-29 RX ORDER — NALOXONE HYDROCHLORIDE 0.4 MG/ML
0.4 INJECTION, SOLUTION INTRAMUSCULAR; INTRAVENOUS; SUBCUTANEOUS AS NEEDED
Status: DISCONTINUED | OUTPATIENT
Start: 2018-06-29 | End: 2018-07-02 | Stop reason: HOSPADM

## 2018-06-29 RX ORDER — SIMETHICONE 80 MG
80 TABLET,CHEWABLE ORAL
COMMUNITY
End: 2020-05-08

## 2018-06-29 RX ORDER — DIPHENHYDRAMINE HYDROCHLORIDE 50 MG/ML
12.5 INJECTION, SOLUTION INTRAMUSCULAR; INTRAVENOUS
Status: DISCONTINUED | OUTPATIENT
Start: 2018-06-29 | End: 2018-07-02 | Stop reason: HOSPADM

## 2018-06-29 RX ORDER — SIMVASTATIN 20 MG/1
20 TABLET, FILM COATED ORAL
Status: DISCONTINUED | OUTPATIENT
Start: 2018-06-30 | End: 2018-07-02 | Stop reason: HOSPADM

## 2018-06-29 RX ORDER — DOCUSATE SODIUM 100 MG/1
100 CAPSULE, LIQUID FILLED ORAL 2 TIMES DAILY
Status: DISCONTINUED | OUTPATIENT
Start: 2018-06-30 | End: 2018-07-02 | Stop reason: HOSPADM

## 2018-06-29 RX ORDER — MAGNESIUM SULFATE 100 %
4 CRYSTALS MISCELLANEOUS AS NEEDED
Status: DISCONTINUED | OUTPATIENT
Start: 2018-06-29 | End: 2018-07-02 | Stop reason: HOSPADM

## 2018-06-29 RX ORDER — LUBIPROSTONE 24 UG/1
24 CAPSULE, GELATIN COATED ORAL 2 TIMES DAILY WITH MEALS
Status: DISCONTINUED | OUTPATIENT
Start: 2018-06-30 | End: 2018-07-02 | Stop reason: HOSPADM

## 2018-06-29 RX ORDER — DEXTROSE 50 % IN WATER (D50W) INTRAVENOUS SYRINGE
25-50 AS NEEDED
Status: DISCONTINUED | OUTPATIENT
Start: 2018-06-29 | End: 2018-07-02 | Stop reason: HOSPADM

## 2018-06-29 RX ORDER — SIMETHICONE 80 MG
80 TABLET,CHEWABLE ORAL
Status: DISCONTINUED | OUTPATIENT
Start: 2018-06-29 | End: 2018-07-02 | Stop reason: HOSPADM

## 2018-06-29 RX ORDER — PREGABALIN 100 MG/1
25 CAPSULE ORAL 2 TIMES DAILY
COMMUNITY
End: 2020-05-08

## 2018-06-29 RX ORDER — POLYETHYLENE GLYCOL 3350 17 G/17G
17 POWDER, FOR SOLUTION ORAL 2 TIMES DAILY
Status: DISCONTINUED | OUTPATIENT
Start: 2018-06-30 | End: 2018-07-02 | Stop reason: HOSPADM

## 2018-06-29 RX ORDER — OXYCODONE AND ACETAMINOPHEN 5; 325 MG/1; MG/1
2 TABLET ORAL
Status: COMPLETED | OUTPATIENT
Start: 2018-06-29 | End: 2018-06-29

## 2018-06-29 RX ORDER — LEVOFLOXACIN 5 MG/ML
500 INJECTION, SOLUTION INTRAVENOUS EVERY 24 HOURS
Status: DISCONTINUED | OUTPATIENT
Start: 2018-06-30 | End: 2018-07-02 | Stop reason: HOSPADM

## 2018-06-29 RX ORDER — DOCUSATE SODIUM 100 MG/1
100 CAPSULE, LIQUID FILLED ORAL 2 TIMES DAILY
COMMUNITY
End: 2020-05-08

## 2018-06-29 RX ORDER — INSULIN LISPRO 100 [IU]/ML
INJECTION, SOLUTION INTRAVENOUS; SUBCUTANEOUS
Status: DISCONTINUED | OUTPATIENT
Start: 2018-06-29 | End: 2018-07-02 | Stop reason: HOSPADM

## 2018-06-29 RX ORDER — HEPARIN SODIUM 5000 [USP'U]/ML
5000 INJECTION, SOLUTION INTRAVENOUS; SUBCUTANEOUS EVERY 8 HOURS
Status: DISCONTINUED | OUTPATIENT
Start: 2018-06-29 | End: 2018-07-02 | Stop reason: HOSPADM

## 2018-06-29 RX ORDER — LEVOFLOXACIN 5 MG/ML
500 INJECTION, SOLUTION INTRAVENOUS
Status: COMPLETED | OUTPATIENT
Start: 2018-06-29 | End: 2018-06-30

## 2018-06-29 RX ORDER — TAMSULOSIN HYDROCHLORIDE 0.4 MG/1
0.4 CAPSULE ORAL
Status: DISCONTINUED | OUTPATIENT
Start: 2018-06-29 | End: 2018-07-02 | Stop reason: HOSPADM

## 2018-06-29 RX ORDER — SODIUM CHLORIDE 0.9 % (FLUSH) 0.9 %
5-10 SYRINGE (ML) INJECTION AS NEEDED
Status: DISCONTINUED | OUTPATIENT
Start: 2018-06-29 | End: 2018-07-02 | Stop reason: HOSPADM

## 2018-06-29 RX ORDER — LISINOPRIL 20 MG/1
20 TABLET ORAL
Status: DISCONTINUED | OUTPATIENT
Start: 2018-06-30 | End: 2018-07-02 | Stop reason: HOSPADM

## 2018-06-29 RX ORDER — PREGABALIN 25 MG/1
25 CAPSULE ORAL 2 TIMES DAILY
Status: DISCONTINUED | OUTPATIENT
Start: 2018-06-30 | End: 2018-07-02 | Stop reason: HOSPADM

## 2018-06-29 RX ORDER — LEVOFLOXACIN 500 MG/1
500 TABLET, FILM COATED ORAL
Status: DISCONTINUED | OUTPATIENT
Start: 2018-06-29 | End: 2018-06-29

## 2018-06-29 RX ORDER — POLYETHYLENE GLYCOL 3350 17 G/17G
17 POWDER, FOR SOLUTION ORAL DAILY
COMMUNITY
End: 2020-05-08

## 2018-06-29 RX ORDER — OXYCODONE AND ACETAMINOPHEN 5; 325 MG/1; MG/1
2 TABLET ORAL
Status: DISCONTINUED | OUTPATIENT
Start: 2018-06-29 | End: 2018-07-02 | Stop reason: HOSPADM

## 2018-06-29 RX ADMIN — OXYCODONE HYDROCHLORIDE AND ACETAMINOPHEN 2 TABLET: 5; 325 TABLET ORAL at 18:18

## 2018-06-29 RX ADMIN — SODIUM CHLORIDE 1000 ML: 900 INJECTION, SOLUTION INTRAVENOUS at 16:46

## 2018-06-29 RX ADMIN — HEPARIN SODIUM 5000 UNITS: 5000 INJECTION, SOLUTION INTRAVENOUS; SUBCUTANEOUS at 23:28

## 2018-06-29 RX ADMIN — SODIUM CHLORIDE 1000 ML: 900 INJECTION, SOLUTION INTRAVENOUS at 17:14

## 2018-06-29 RX ADMIN — LEVOFLOXACIN 500 MG: 5 INJECTION, SOLUTION INTRAVENOUS at 19:25

## 2018-06-29 RX ADMIN — SODIUM CHLORIDE 466 ML: 900 INJECTION, SOLUTION INTRAVENOUS at 17:51

## 2018-06-29 RX ADMIN — TAMSULOSIN HYDROCHLORIDE 0.4 MG: 0.4 CAPSULE ORAL at 23:29

## 2018-06-29 NOTE — IP AVS SNAPSHOT
Lawrence Talaveran 
 
 
 [address] [facility] [address] 
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 
[address] [facility] Internal [address] 
[phone] [address] [facility] to continue managing your healthcare needs [facility] [phone] Care Mgmt contacted MD's office (Dr. Hope Hanley) on 7/2/18 to assist in scheduling f/u appointment for patient. MD's office will contact patient with f/u appointment date/time. Your Scheduled Appointments Tuesday July 03, 2018 To Be Determined PTA HOME VISIT with TWAN Wells [address] [place] [address] CARE SCHEDULING/INTAKE ([facility] [address] [facility] CARE SCHEDULING/INTAKE ([facility]) Tuesday July 03, 2018 To Be Determined SN DISCIPLINE DISCHARGE with ANURAG Ricci [facility] [employer] [facility] HOME CARE SCHEDULING/INTAKE ([facility] [address] [facility] CARE SCHEDULING/INTAKE ([facility]) Thursday July 05, 2018 To Be Determined PTA HOME VISIT with TWAN Wells [employer] [facility] HOME CARE SCHEDULING/INTAKE ([facility]) 325 [facility] SCHEDULING/INTAKE ([facility]) Discharge Orders None A check michelle indicates which time of day the medication should be taken. My Medications START taking these medications Instructions Each Dose to Equal  
 Morning Noon Evening Bedtime  
 levoFLOXacin 500 mg tablet Commonly known as:  Bianka Armor Your last dose was: Your next dose is: Take 1 Tab by mouth daily. 500 mg Saccharomyces boulardii 250 mg capsule Commonly known as:  Fratnz Controls Your last dose was: Your next dose is: Take 2 Caps by mouth two (2) times a day for 7 days. 500 mg  
    
   
   
   
  
 tamsulosin 0.4 mg capsule Commonly known as:  FLOMAX Your last dose was: Your next dose is: Take 1 Cap by mouth nightly. 0.4 mg  
    
   
   
   
  
  
CONTINUE taking these medications Instructions Each Dose to Equal  
 Morning Noon Evening Bedtime COLACE 100 mg capsule Generic drug:  docusate sodium Your last dose was: Your next dose is: Take 100 mg by mouth two (2) times a day. 100 mg  
    
   
   
   
  
 GAS-X 80 mg chewable tablet Generic drug:  simethicone Your last dose was: Your next dose is: Take 80 mg by mouth every six (6) hours as needed for Flatulence. 80 mg  
    
   
   
   
  
 JANUVIA 50 mg tablet Generic drug:  SITagliptin Your last dose was: Your next dose is: Take 50 mg by mouth daily. 50 mg  
    
   
   
   
  
 lactulose 10 gram/15 mL solution Commonly known as:  Elenor Spatz Your last dose was: Your next dose is: Take 45 mL by mouth two (2) times a day. 30 g  
    
   
   
   
  
 lisinopril 20 mg tablet Commonly known as:  Val Garza Your last dose was: Your next dose is: Take 20 mg by mouth nightly. 20 mg  
    
   
   
   
  
 lubiPROStone 8 mcg capsule Commonly known as:  Phoebe Conch Your last dose was: Your next dose is: Take 1 Cap by mouth two (2) times daily (with meals). Indications: OPIOID-INDUCED CONSTIPATION  
 8 mcg LYRICA 100 mg capsule Generic drug:  pregabalin Your last dose was: Your next dose is: Take 25 mg by mouth two (2) times a day. 25 mg  
    
   
   
   
  
 metFORMIN 500 mg tablet Commonly known as:  GLUCOPHAGE Your last dose was: Your next dose is: Take 1,000 mg by mouth two (2) times daily (with meals). 1000 mg MIRALAX 17 gram packet Generic drug:  polyethylene glycol Your last dose was: Your next dose is: Take 17 g by mouth daily. 17 g  
    
   
   
   
  
 oxyCODONE-acetaminophen 5-325 mg per tablet Commonly known as:  PERCOCET Your last dose was: Your next dose is: Take 1 Tab by mouth every four (4) hours as needed. Max Daily Amount: 6 Tabs. 1 Tab  
    
   
   
   
  
 simvastatin 20 mg tablet Commonly known as:  ZOCOR Your last dose was: Your next dose is: Take  by mouth nightly. STOP taking these medications   
 morphine CR 60 mg CR tablet Commonly known as:  MS CONTIN Where to Get Your Medications These medications were sent to [facility] [address], [facility] [address]  [address], [facility] [address] [facility] Phone:  [phone]  
  levoFLOXacin 500 mg tablet Saccharomyces boulardii 250 mg capsule  
 tamsulosin 0.4 mg capsule Opioid Education  Prescription Opioids: What You Need to Know: 
 
Prescription opioids can be used to help relieve moderate-to-severe pain and are often prescribed following a surgery or injury, or for certain health conditions. These medications can be an important part of treatment but also come with serious risks. Opioids are strong pain medicines. Examples include hydrocodone, oxycodone, fentanyl, and morphine. Heroin is an example of an illegal opioid. It is important to work with your health care provider to make sure you are getting the safest, most effective care. WHAT ARE THE RISKS AND SIDE EFFECTS OF OPIOID USE? Prescription opioids carry serious risks of addiction and overdose, especially with prolonged use. An opioid overdose, often marked by slow breathing, can cause sudden death. The use of prescription opioids can have a number of side effects as well, even when taken as directed. · Tolerance-meaning you might need to take more of a medication for the same pain relief · Physical dependence-meaning you have symptoms of withdrawal when the medication is stopped. Withdrawal symptoms can include nausea, sweating, chills, diarrhea, stomach cramps, and muscle aches. Withdrawal can last up to several weeks, depending on which drug you took and how long you took it. · Increased sensitivity to pain · Constipation · Nausea, vomiting, and dry mouth · Sleepiness and dizziness · Confusion · Depression · Low levels of testosterone that can result in lower sex drive, energy, and strength · Itching and sweating RISKS ARE GREATER WITH:      
· History of drug misuse, substance use disorder, or overdose · Mental health conditions (such as depression or anxiety) · Sleep apnea · Older age (72 years or older) · Pregnancy Avoid alcohol while taking prescription opioids. Also, unless specifically advised by your health care provider, medications to avoid include: · Benzodiazepines (such as Xanax or Valium) · Muscle relaxants (such as Soma or Flexeril) · Hypnotics (such as Ambien or Lunesta) · Other prescription opioids KNOW YOUR OPTIONS Talk to your health care provider about ways to manage your pain that don't involve prescription opioids. Some of these options may actually work better and have fewer risks and side effects. Options may include: 
· Pain relievers such as acetaminophen, ibuprofen, and naproxen · Some medications that are also used for depression or seizures · Physical therapy and exercise · Counseling to help patients learn how to cope better with triggers of pain and stress. · Application of heat or cold compress · Massage therapy · Relaxation techniques Be Informed Make sure you know the name of your medication, how much and how often to take it, and its potential risks & side effects. IF YOU ARE PRESCRIBED OPIOIDS FOR PAIN: 
· Never take opioids in greater amounts or more often than prescribed. Remember the goal is not to be pain-free but to manage your pain at a tolerable level. · Follow up with your primary care provider to: · Work together to create a plan on how to manage your pain. · Talk about ways to help manage your pain that don't involve prescription opioids. · Talk about any and all concerns and side effects. · Help prevent misuse and abuse. · Never sell or share prescription opioids · Help prevent misuse and abuse. · Store prescription opioids in a secure place and out of reach of others (this may include visitors, children, friends, and family). · Safely dispose of unused/unwanted prescription opioids: Find your community drug take-back program or your pharmacy mail-back program, or flush them down the toilet, following guidance from the Food and Drug Administration (www.fda.gov/Drugs/ResourcesForYou). · Visit www.cdc.gov/drugoverdose to learn about the risks of opioid abuse and overdose. · If you believe you may be struggling with addiction, tell your health care provider and ask for guidance or call [employer] at 4-277-979-ETSI. Discharge Instructions Constipation: Care Instructions Your Care Instructions Constipation means that you have a hard time passing stools (bowel movements). People pass stools from 3 times a day to once every 3 days. What is normal for you may be different. Constipation may occur with pain in the rectum and cramping. The pain may get worse when you try to pass stools. Sometimes there are small amounts of bright red blood on toilet paper or the surface of stools. This is because of enlarged veins near the rectum (hemorrhoids). A few changes in your diet and lifestyle may help you avoid ongoing constipation. Your doctor may also prescribe medicine to help loosen your stool. Some medicines can cause constipation. These include pain medicines and antidepressants. Tell your doctor about all the medicines you take. Your doctor may want to make a medicine change to ease your symptoms. Follow-up care is a key part of your treatment and safety. Be sure to make and go to all appointments, and call your doctor if you are having problems. It's also a good idea to know your test results and keep a list of the medicines you take. How can you care for yourself at home? · Drink plenty of fluids, enough so that your urine is light yellow or clear like water. If you have kidney, heart, or liver disease and have to limit fluids, talk with your doctor before you increase the amount of fluids you drink. · Include high-fiber foods in your diet each day. These include fruits, vegetables, beans, and whole grains. · Get at least 30 minutes of exercise on most days of the week. Walking is a good choice. You also may want to do other activities, such as running, swimming, cycling, or playing tennis or team sports. · Take a fiber supplement, such as Citrucel or Metamucil, every day. Read and follow all instructions on the label. · Schedule time each day for a bowel movement. A daily routine may help. Take your time having your bowel movement. · Support your feet with a small step stool when you sit on the toilet. This helps flex your hips and places your pelvis in a squatting position. · Your doctor may recommend an over-the-counter laxative to relieve your constipation. Examples are Milk of Magnesia and MiraLax. Read and follow all instructions on the label. Do not use laxatives on a long-term basis. When should you call for help? Call your doctor now or seek immediate medical care if: 
? · You have new or worse belly pain. ? · You have new or worse nausea or vomiting. ? · You have blood in your stools. ? Watch closely for changes in your health, and be sure to contact your doctor if: 
? · Your constipation is getting worse. ? · You do not get better as expected. Where can you learn more? Go to http://neidaOuternetmaryuri.info/. Enter 21 [phone] in the search box to learn more about \"Constipation: Care Instructions. \" Current as of: March 20, 2017 Content Version: 11.4 © 7584-5771 [employer]. Care instructions adapted under license by [employer] (which disclaims liability or warranty for this information). If you have questions about a medical condition or this instruction, always ask your healthcare professional. [address] any warranty or liability for your use of this information. Constipation: Care Instructions Your Care Instructions Constipation means that you have a hard time passing stools (bowel movements). People pass stools from 3 times a day to once every 3 days. What is normal for you may be different. Constipation may occur with pain in the rectum and cramping. The pain may get worse when you try to pass stools.  Sometimes there are small amounts of bright red blood on toilet paper or the surface of stools. This is because of enlarged veins near the rectum (hemorrhoids). A few changes in your diet and lifestyle may help you avoid ongoing constipation. Your doctor may also prescribe medicine to help loosen your stool. Some medicines can cause constipation. These include pain medicines and antidepressants. Tell your doctor about all the medicines you take. Your doctor may want to make a medicine change to ease your symptoms. Follow-up care is a key part of your treatment and safety. Be sure to make and go to all appointments, and call your doctor if you are having problems. It's also a good idea to know your test results and keep a list of the medicines you take. How can you care for yourself at home? · Drink plenty of fluids, enough so that your urine is light yellow or clear like water. If you have kidney, heart, or liver disease and have to limit fluids, talk with your doctor before you increase the amount of fluids you drink. · Include high-fiber foods in your diet each day. These include fruits, vegetables, beans, and whole grains. · Get at least 30 minutes of exercise on most days of the week. Walking is a good choice. You also may want to do other activities, such as running, swimming, cycling, or playing tennis or team sports. · Take a fiber supplement, such as Citrucel or Metamucil, every day. Read and follow all instructions on the label. · Schedule time each day for a bowel movement. A daily routine may help. Take your time having your bowel movement. · Support your feet with a small step stool when you sit on the toilet. This helps flex your hips and places your pelvis in a squatting position. · Your doctor may recommend an over-the-counter laxative to relieve your constipation. Examples are Milk of Magnesia and MiraLax. Read and follow all instructions on the label. Do not use laxatives on a long-term basis. When should you call for help? Call your doctor now or seek immediate medical care if: 
? · You have new or worse belly pain. ? · You have new or worse nausea or vomiting. ? · You have blood in your stools. ? Watch closely for changes in your health, and be sure to contact your doctor if: 
? · Your constipation is getting worse. ? · You do not get better as expected. Where can you learn more? Go to http://neida-maryuri.info/. Enter 21 [phone] in the search box to learn more about \"Constipation: Care Instructions. \" Current as of: March 20, 2017 Content Version: 11.4 © 2453-7652 [employer]. Care instructions adapted under license by [employer] (which disclaims liability or warranty for this information). If you have questions about a medical condition or this instruction, always ask your healthcare professional. [address] any warranty or liability for your use of this information. Counting Carbohydrates When You Take Insulin: Care Instructions Your Care Instructions You don't have to eat special foods when you take insulin. You just have to be careful to eat healthy foods. And you have to spread throughout the day the carbohydrates you eat. Carbohydrates raise blood sugar higher and more quickly than any other nutrient. It is found in desserts, breads and cereals, and fruit. It's also found in starchy vegetables such as potatoes and corn, grains such as rice and pasta, and milk and yogurt. The more carbohydrates, or carbs, you eat at one time, the higher your blood sugar will rise. Spreading carbs throughout the day helps keep your blood sugar levels within your target range. Counting carbs is one of the best ways to keep your blood sugar under control when you use insulin.  It helps you match the right amount of insulin to the number of grams of carbohydrates in a meal. You need to test your blood sugar several times a day to learn how carbs affect you. Then you can change your diet and insulin dose as needed. A registered dietitian or certified diabetes educator can help you plan meals and snacks. Follow-up care is a key part of your treatment and safety. Be sure to make and go to all appointments, and call your doctor if you are having problems. It's also a good idea to know your test results and keep a list of the medicines you take. How can you care for yourself at home? Know your daily amount of carbohydrates Your daily amount depends on several things, including your weight, how active you are, which diabetes medicines you take, and what your goals are for your blood sugar levels. A registered dietitian or certified diabetes educator can help you plan how many carbohydrates to include in each meal and snack. For most adults, a guideline for the daily amount of carbohydrates is: · 45 to 60 grams at each meal. That's about the same as 3 to 4 carbohydrate servings. · 15 to 20 grams at each snack. That's about the same as 1 carbohydrate serving. Count carbs If you take insulin, you need to know how many grams of carbohydrates are in a meal. This lets you know how much rapid-acting insulin to take before you eat. If you use an insulin pump, you get a constant rate of insulin during the day. So the pump must be programmed at meals to give you extra insulin to cover the rise in blood sugar after meals. When you know how many carbohydrates you will eat, you can take the right amount of insulin. Or, if you always use the same amount of insulin, you need to make sure that you eat the same amount of carbs at meals. · Learn your own insulin-to-carbohydrates ratio. You and your diabetes health professional will figure out the ratio. You can do this by testing your blood sugar after meals. For example, you may need a certain amount of insulin for every 15 grams of carbohydrates. · Add up the carbohydrate grams in a meal. Then you can figure out how many units of insulin to take based on your insulin-to-carbohydrates ratio. · Look at labels on packaged foods. This can tell you how many carbohydrates are in a serving. You can also use guides from the American Diabetes Association. · Be aware of portions, or serving sizes. If a package has two servings and you eat the whole package, you need to double the number of grams of carbohydrates listed for one serving. · Protein, fat, and fiber do not raise blood sugar as much as carbs do. If you eat a lot of these nutrients in a meal, your blood sugar will rise more slowly than it would otherwise. · Exercise lowers blood sugar. You can use less insulin than you would if you were not doing exercise. Keep in mind that timing matters. If you exercise within 1 hour after a meal, your body may need less insulin for that meal than it would if you exercised 3 hours after the meal. Test your blood sugar to find out how exercise affects your need for insulin. Eat from all food groups · Eat at least three meals a day. · Plan meals to include food from all the food groups. ¨ Grains: 1 slice of bread (1 ounce), ½ cup of cooked cereal, and 1/3 cup of cooked pasta or rice. These have about 15 grams of carbohydrates in a serving. Choose whole grains. These include whole wheat bread or crackers, oatmeal, and brown rice. Have them more often than refined grains. ¨ Fruit: 1 small fresh fruit, such as an apple or orange; ½ of a banana; ½ cup of chopped, cooked, or canned fruit; ½ cup of fruit juice; 1 cup of melon or raspberries; and 2 tablespoons of dried fruit. These have about 15 grams of carbohydrates in a serving. ¨ Dairy: 1 cup of nonfat or low-fat milk and 2/3 cup of plain yogurt. These have about 15 grams of carbohydrates in a serving.  
¨ Protein foods: Beef, chicken, turkey, fish, eggs, tofu, cheese, cottage cheese, and peanut butter. A serving size of meat is 3 ounces. This is about the size of a deck of cards. Examples of meat substitute serving sizes (equal to 1 ounce of meat) are 1/4 cup of cottage cheese, 1 egg, 1 tablespoon of peanut butter, and ½ cup of tofu. These have very little or no carbohydrates in a serving. ¨ Vegetables: Starchy vegetables such as ½ cup of cooked beans, peas, potatoes, or corn have about 15 grams of carbohydrates. Nonstarchy vegetables have very little carbohydrates. These include 1 cup of raw leafy vegetables (such as spinach), ½ cup of other vegetables (cooked or chopped), and 3/4 cup of vegetable juice. · Talk to your dietitian or diabetes educator about ways to add limited amounts of sweets into your meal plan. · If you drink alcohol: ¨ Limit it to no more than 1 drink a day for women and 2 drinks a day for men. (One drink is 12 fl oz of beer, 5 fl oz of wine, or 1.5 fl oz liquor.) ¨ Make sure to count drink mixers that have sugar in your total carbohydrate count. These include cola, tonic water, ancelmo mix, and fruit juice. ¨ Eat a carbohydrate food along with your alcoholic drink. ¨ Check your blood sugar more often. This is because alcohol can lower your blood sugar too much. This may happen even hours later while you sleep. You may want to eat and adjust your insulin dose when you drink alcohol to prevent severe low blood sugar. ¨ Talk to your doctor. Alcohol may not be recommended when you are taking certain diabetes medicines. Where can you learn more? Go to http://neida-maryuri.info/. Enter P087 in the search box to learn more about \"Counting Carbohydrates When You Take Insulin: Care Instructions. \" Current as of: March 13, 2017 Content Version: 11.4 © 2389-6437 [employer].  Care instructions adapted under license by [employer]) (which disclaims liability or warranty for this information). If you have questions about a medical condition or this instruction, always ask your healthcare professional. [address] any warranty or liability for your use of this information. Learning About Diabetes Food Guidelines Your Care Instructions Meal planning is important to manage diabetes. It helps keep your blood sugar at a target level (which you set with your doctor). You don't have to eat special foods. You can eat what your family eats, including sweets once in a while. But you do have to pay attention to how often you eat and how much you eat of certain foods. You may want to work with a dietitian or a certified diabetes educator (CDE) to help you plan meals and snacks. A dietitian or CDE can also help you lose weight if that is one of your goals. What should you know about eating carbs? Managing the amount of carbohydrate (carbs) you eat is an important part of healthy meals when you have diabetes. Carbohydrate is found in many foods. · Learn which foods have carbs. And learn the amounts of carbs in different foods. ¨ Bread, cereal, pasta, and rice have about 15 grams of carbs in a serving. A serving is 1 slice of bread (1 ounce), ½ cup of cooked cereal, or 1/3 cup of cooked pasta or rice. ¨ Fruits have 15 grams of carbs in a serving. A serving is 1 small fresh fruit, such as an apple or orange; ½ of a banana; ½ cup of cooked or canned fruit; ½ cup of fruit juice; 1 cup of melon or raspberries; or 2 tablespoons of dried fruit. ¨ Milk and no-sugar-added yogurt have 15 grams of carbs in a serving. A serving is 1 cup of milk or 2/3 cup of no-sugar-added yogurt. ¨ Starchy vegetables have 15 grams of carbs in a serving. A serving is ½ cup of mashed potatoes or sweet potato; 1 cup winter squash; ½ of a small baked potato; ½ cup of cooked beans; or ½ cup cooked corn or green peas.  
· Learn how much carbs to eat each day and at each meal. A dietitian or CDE can teach you how to keep track of the amount of carbs you eat. This is called carbohydrate counting. · If you are not sure how to count carbohydrate grams, use the Plate Method to plan meals. It is a good, quick way to make sure that you have a balanced meal. It also helps you spread carbs throughout the day. ¨ Divide your plate by types of foods. Put non-starchy vegetables on half the plate, meat or other protein food on one-quarter of the plate, and a grain or starchy vegetable in the final quarter of the plate. To this you can add a small piece of fruit and 1 cup of milk or yogurt, depending on how many carbs you are supposed to eat at a meal. 
· Try to eat about the same amount of carbs at each meal. Do not \"save up\" your daily allowance of carbs to eat at one meal. 
· Proteins have very little or no carbs per serving. Examples of proteins are beef, chicken, turkey, fish, eggs, tofu, cheese, cottage cheese, and peanut butter. A serving size of meat is 3 ounces, which is about the size of a deck of cards. Examples of meat substitute serving sizes (equal to 1 ounce of meat) are 1/4 cup of cottage cheese, 1 egg, 1 tablespoon of peanut butter, and ½ cup of tofu. How can you eat out and still eat healthy? · Learn to estimate the serving sizes of foods that have carbohydrate. If you measure food at home, it will be easier to estimate the amount in a serving of restaurant food. · If the meal you order has too much carbohydrate (such as potatoes, corn, or baked beans), ask to have a low-carbohydrate food instead. Ask for a salad or green vegetables. · If you use insulin, check your blood sugar before and after eating out to help you plan how much to eat in the future. · If you eat more carbohydrate at a meal than you had planned, take a walk or do other exercise. This will help lower your blood sugar. What else should you know? · Limit saturated fat, such as the fat from meat and dairy products.  This is a healthy choice because people who have diabetes are at higher risk of heart disease. So choose lean cuts of meat and nonfat or low-fat dairy products. Use olive or canola oil instead of butter or shortening when cooking. · Don't skip meals. Your blood sugar may drop too low if you skip meals and take insulin or certain medicines for diabetes. · Check with your doctor before you drink alcohol. Alcohol can cause your blood sugar to drop too low. Alcohol can also cause a bad reaction if you take certain diabetes medicines. Follow-up care is a key part of your treatment and safety. Be sure to make and go to all appointments, and call your doctor if you are having problems. It's also a good idea to know your test results and keep a list of the medicines you take. Where can you learn more? Go to http://neida-maryuri.info/. Enter E310 in the search box to learn more about \"Learning About Diabetes Food Guidelines. \" Current as of: March 13, 2017 Content Version: 11.4 © 7358-5315 [employer]. Care instructions adapted under license by [employer] (which disclaims liability or warranty for this information). If you have questions about a medical condition or this instruction, always ask your healthcare professional. [address] any warranty or liability for your use of this information. [employer] Announcement We are excited to announce that we are making your provider's discharge notes available to you in [employer]. You will see these notes when they are completed and signed by the physician that discharged you from your recent hospital stay. If you have any questions or concerns about any information you see in [employer], please call the Health Information Department where you were seen or reach out to your Primary Care Provider for more information about your plan of care. Introducing [facility]! Dear Harsh Cuello: Thank you for requesting a Alekto account. Our records indicate that you already have an active Alekto account. You can access your account anytime at [email or web address] Did you know that you can access your hospital and ER discharge instructions at any time in Alekto? You can also review all of your test results from your hospital stay or ER visit. Additional Information If you have questions, please visit the Frequently Asked Questions section of the Alekto website at [email or web address] Remember, Alekto is NOT to be used for urgent needs. For medical emergencies, dial 911. Now available from your iPhone and Android! Introducing Cricket Harley As a [facility]atum Pe[facility]aloza patient, I wanted to make you aware of our electronic visit tool called Cricket Jamisonalinakimberly. Tatum Pe[facility]aloza 24/7 allows you to connect within minutes with a medical provider 24 hours a day, seven days a week via a mobile device or tablet or logging into a secure website from your computer. You can access Cricket Jamisonalinafin from anywhere in the [place]. A virtual visit might be right for you when you have a simple condition and feel like you just dont want to get out of bed, or cant get away from work for an appointment, when your regular [facility] Pe[facility]aloza provider is not available (evenings, weekends or holidays), or when youre out of town and need minor care. Electronic visits cost only $49 and if the Tatum Peñaloza 24/7 provider determines a prescription is needed to treat your condition, one can be electronically transmitted to a nearby pharmacy*. Please take a moment to enroll today if you have not already done so. The enrollment process is free and takes just a few minutes. To enroll, please download the Tatumaubrie Peñaloza 24/7 lonnie to your tablet or phone, or visit [email or web address] to enroll on your computer. And, as an [address] [place] [address] patient with a [employer] account, the results of your visits will be scanned into your electronic medical record and your primary care provider will be able to view the scanned results. We urge you to continue to see your regular [facility] provider for your ongoing medical care. And while your primary care provider may not be the one available when you seek a Cricket Harley virtual visit, the peace of mind you get from getting a real diagnosis real time can be priceless. For more information on Cricket Monkfin, view our Frequently Asked Questions (FAQs) at [email or web address]. Sincerely, 
 
Rod Raya MD 
Chief Medical Officer Jaquan Rush *:  certain medications cannot be prescribed via [employer]" Unresulted Labs-Please follow up with your PCP about these lab tests Order Current Status CULTURE, BLOOD Preliminary result CULTURE, BLOOD Preliminary result CULTURE, BLOOD Preliminary result CULTURE, BLOOD Preliminary result EKG, 12 LEAD, INITIAL Preliminary result Providers Seen During Your Hospitalization Provider Specialty Primary office phone Hilda Prieto MD Emergency Medicine [phone] Willian Quintana MD Internal Medicine [phone] Your Primary Care Physician (PCP) Primary Care Physician Office Phone Office Fax Jody Pavon [phone] You are allergic to the following No active allergies Recent Documentation Height Weight BMI Smoking Status 1.88 m 115.3 kg 32.64 kg/m2 Never Smoker Emergency Contacts Name Discharge Info Relation Home Work Mobile [address] [facility] CAREGIVER [3] Spouse [3] [phone] Patient Belongings  The following personal items are in your possession at time of discharge: 
  Dental Appliances: None  Visual Aid: None      Home Medications: None Jewelry: None  Clothing: Pants, Socks, Footwear, Shirt    Other Valuables: Walker (back brace) Please provide this summary of care documentation to your next provider. Signatures-by signing, you are acknowledging that this After Visit Summary has been reviewed with you and you have received a copy. Patient Signature:  ____________________________________________________________ Date:  ____________________________________________________________  
  
Earnstine Santos Provider Signature:  ____________________________________________________________ Date:  ____________________________________________________________

## 2018-06-29 NOTE — ED NOTES
Pt feeling lightheaded, feeling unwell, clammy, chills, fatigued. Started checking fever yesterday and went as high as 102.

## 2018-06-29 NOTE — ED PROVIDER NOTES
EMERGENCY DEPARTMENT HISTORY AND PHYSICAL EXAM    Date: 6/29/2018  Patient Name: Imelda Macdonald    History of Presenting Illness     Chief Complaint   Patient presents with    Post-Op Problem    Fever         History Provided By: Patient    Chief Complaint: Post-Op Fever  Duration: 1 Days  Timing:  Acute  Severity: 3 out of 10  Modifying Factors: No modifying factors   Associated Symptoms: chills, diaphoresis, back pain, constipation, abd pain, abd distension, numbness, and dysuria    Additional History (Context):   4:16 PM   Imelda Macdonald is a 65 y.o. male with PMHx of DM, chronic lower back pain, HLD, varicella, arthritis, HTN, Basal cell skin CA, and thyroid disease and SHx of spinal fusion (x2)presents to the emergency department C/O Post-op fever onset last night. Associated sxs include chills, diaphoresis, back pain, constipation, abd pain, abd distension, numbness, and dysuria. Pt states that last night he had a fever of 102F and called his surgeon, Dr. Dai Zamora, who informed the patient that if the fever persisted into the morning to come to the ED. The fever went down from 102 to 100 and has since gone down, however the patient feels generalized body aches and decided to come to the ED. Since the L4-L5-S1 fusion on 6/13/2018 the patient has had posterior leg numbness. Pt's wife also states that yesterday the patient \"overdid it on one of his walks\". Pt has been taking Percocet for his pain, alternating between 2 and 1 at a time. Last seen his surgeon on the day of his surgery. Last BM was 1 hour ago but was very small. Pt denies cough, SOB, fever, sore throat, CP, leg swelling, nausea, or vomiting, and any other sxs or complaints.      PCP: Lupe Irwin MD    Current Facility-Administered Medications   Medication Dose Route Frequency Provider Last Rate Last Dose    sodium chloride (NS) flush 5-10 mL  5-10 mL IntraVENous PRN SHIRA Juarez        levoFLOXacin (LEVAQUIN) 500 mg in D5W IVPB  500 mg IntraVENous NOW [facility] SHIRA Lay 100 mL/hr at 06/29/18 1925 500 mg at 06/29/18 1925     Current Outpatient Prescriptions   Medication Sig Dispense Refill    docusate sodium (COLACE) 100 mg capsule Take 100 mg by mouth two (2) times a day.  simethicone (GAS-X) 80 mg chewable tablet Take 80 mg by mouth every six (6) hours as needed for Flatulence.  polyethylene glycol (MIRALAX) 17 gram packet Take 17 g by mouth daily.  pregabalin (LYRICA) 100 mg capsule Take 25 mg by mouth two (2) times a day.  oxyCODONE-acetaminophen (PERCOCET) 5-325 mg per tablet Take 1 Tab by mouth every four (4) hours as needed. Max Daily Amount: 6 Tabs. 60 Tab 0    SITagliptin (JANUVIA) 50 mg tablet Take 50 mg by mouth daily.  lisinopril (PRINIVIL, ZESTRIL) 20 mg tablet Take 20 mg by mouth nightly.  metFORMIN (GLUCOPHAGE) 500 mg tablet Take 1,000 mg by mouth two (2) times daily (with meals).  simvastatin (ZOCOR) 20 mg tablet Take  by mouth nightly.  lubiPROStone (AMITIZA) 8 mcg capsule Take 1 Cap by mouth two (2) times daily (with meals). Indications: OPIOID-INDUCED CONSTIPATION 10 Cap 0    lactulose (CHRONULAC) 10 gram/15 mL solution Take 45 mL by mouth two (2) times a day. 1 Bottle 0    morphine CR (MS CONTIN) 60 mg CR tablet Take 1 Tab by mouth every twelve (12) hours.  Max Daily Amount: 120 mg. 30 Tab 0       Past History     Past Medical History:  Past Medical History:   Diagnosis Date    Adverse effect of anesthesia 2018    hoarseness  after recent ACDF surgery, some difficulty swallowing    Arthritis     Cancer ([address]     Basal cell skin cancers    Chronic low back pain     Diabetic neuropathy ([address]     DM (diabetes mellitus) ([address]     GERD (gastroesophageal reflux disease)     Hematuria     HTN (hypertension)     Hyperlipidemia     Pyogenic granuloma of skin and subcutaneous tissue     Thyroid disease 1979    Varicella        Past Surgical History:  Past Surgical History:   Procedure Laterality Date    HX APPENDECTOMY      HX BACK SURGERY  2018    cervical    HX ENDOSCOPY      HX HEENT      partial thyroidectomy    HX UROLOGICAL  2010's    Cystoscopy, ureteroscopy with stent placement    NEUROLOGICAL PROCEDURE UNLISTED      Epidural steroid injections       Family History:  Family History   Problem Relation Age of Onset    Cancer Father     Cancer Mother     Diabetes Mother        Social History:  Social History   Substance Use Topics    Smoking status: Never Smoker    Smokeless tobacco: Never Used    Alcohol use No       Allergies:  No Known Allergies      Review of Systems   Review of Systems   Constitutional: Positive for chills and diaphoresis. Negative for fever. HENT: Negative for sore throat. Respiratory: Negative for cough and shortness of breath. Cardiovascular: Negative for chest pain and leg swelling. Gastrointestinal: Positive for abdominal distention, abdominal pain and constipation. Negative for nausea and vomiting. Genitourinary: Positive for dysuria. Musculoskeletal: Positive for back pain. Neurological: Positive for numbness. All other systems reviewed and are negative. Physical Exam     Vitals:    06/29/18 1514 06/29/18 1708 06/29/18 1818 06/29/18 1912   BP: 134/78 126/71 137/86 153/81   Pulse: (!) 110 99 100 (!) 102   Resp: 16 18 16 16   Temp: 98.4 °F (36.9 °C) 100.2 °F (37.9 °C) (!) 101.2 °F (38.4 °C) (!) 100.8 °F (38.2 °C)   SpO2: 100% 98% 99% 97%   Weight: 115.7 kg (255 lb)      Height: 6' 2\" (1.88 m)        Physical Exam   Constitutional: He is oriented to person, place, and time. He appears well-developed and well-nourished. No distress. Overweight, in NAD   HENT:   Head: Normocephalic and atraumatic. Eyes: Conjunctivae and EOM are normal. Pupils are equal, round, and reactive to light. Neck: Normal range of motion. Neck supple. Cardiovascular: Normal rate and regular rhythm.     Pulmonary/Chest: Effort normal and breath sounds normal.   Abdominal: Soft. Bowel sounds are normal. He exhibits no distension. There is tenderness (mild suprapubic). There is no rebound and no guarding. Musculoskeletal: Normal range of motion. Lumbar spine with healing surgical wound without erythema warmth swelling or ttp   Neurological: He is alert and oriented to person, place, and time. Skin: Skin is warm. Psychiatric: He has a normal mood and affect. His behavior is normal.   Nursing note and vitals reviewed.         Diagnostic Study Results     Labs -     Recent Results (from the past 12 hour(s))   URINALYSIS W/ RFLX MICROSCOPIC    Collection Time: 06/29/18  3:43 PM   Result Value Ref Range    Color DARK YELLOW      Appearance CLOUDY      Specific gravity 1.023 1.005 - 1.030      pH (UA) 6.0 5.0 - 8.0      Protein 30 (A) NEG mg/dL    Glucose NEGATIVE  NEG mg/dL    Ketone TRACE (A) NEG mg/dL    Bilirubin NEGATIVE  NEG      Blood SMALL (A) NEG      Urobilinogen 1.0 0.2 - 1.0 EU/dL    Nitrites POSITIVE (A) NEG      Leukocyte Esterase LARGE (A) NEG     URINE MICROSCOPIC ONLY    Collection Time: 06/29/18  3:43 PM   Result Value Ref Range    WBC TOO NUMEROUS TO COUNT 0 - 5 /hpf    RBC 0 to 1 0 - 5 /hpf    Epithelial cells FEW 0 - 5 /lpf    Bacteria 4+ (A) NEG /hpf   LACTIC ACID    Collection Time: 06/29/18  4:43 PM   Result Value Ref Range    Lactic acid 1.3 0.4 - 2.0 MMOL/L   METABOLIC PANEL, COMPREHENSIVE    Collection Time: 06/29/18  4:43 PM   Result Value Ref Range    Sodium 135 (L) 136 - 145 mmol/L    Potassium 4.3 3.5 - 5.5 mmol/L    Chloride 98 (L) 100 - 108 mmol/L    CO2 28 21 - 32 mmol/L    Anion gap 9 3.0 - 18 mmol/L    Glucose 169 (H) 74 - 99 mg/dL    BUN 18 7.0 - 18 MG/DL    Creatinine 1.19 0.6 - 1.3 MG/DL    BUN/Creatinine ratio 15 12 - 20      GFR est AA >60 >60 ml/min/1.73m2    GFR est non-AA >60 >60 ml/min/1.73m2    Calcium 8.8 8.5 - 10.1 MG/DL    Bilirubin, total 1.1 (H) 0.2 - 1.0 MG/DL    ALT (SGPT) 17 16 - 61 U/L    AST (SGOT) 13 (L) 15 - 37 U/L    Alk. phosphatase 75 45 - 117 U/L    Protein, total 7.6 6.4 - 8.2 g/dL    Albumin 3.2 (L) 3.4 - 5.0 g/dL    Globulin 4.4 (H) 2.0 - 4.0 g/dL    A-G Ratio 0.7 (L) 0.8 - 1.7     CBC WITH AUTOMATED DIFF    Collection Time: 06/29/18  4:43 PM   Result Value Ref Range    WBC 14.4 (H) 4.6 - 13.2 K/uL    RBC 4.30 (L) 4.70 - 5.50 M/uL    HGB 12.2 (L) 13.0 - 16.0 g/dL    HCT 37.5 36.0 - 48.0 %    MCV 87.2 74.0 - 97.0 FL    MCH 28.4 24.0 - 34.0 PG    MCHC 32.5 31.0 - 37.0 g/dL    RDW 13.0 11.6 - 14.5 %    PLATELET 499 713 - 763 K/uL    MPV 11.1 9.2 - 11.8 FL    NEUTROPHILS 79 (H) 40 - 73 %    LYMPHOCYTES 12 (L) 21 - 52 %    MONOCYTES 9 3 - 10 %    EOSINOPHILS 0 0 - 5 %    BASOPHILS 0 0 - 2 %    ABS. NEUTROPHILS 11.2 (H) 1.8 - 8.0 K/UL    ABS. LYMPHOCYTES 1.8 0.9 - 3.6 K/UL    ABS. MONOCYTES 1.4 (H) 0.05 - 1.2 K/UL    ABS. EOSINOPHILS 0.0 0.0 - 0.4 K/UL    ABS. BASOPHILS 0.0 0.0 - 0.06 K/UL    DF AUTOMATED     EKG, 12 LEAD, INITIAL    Collection Time: 06/29/18  5:04 PM   Result Value Ref Range    Ventricular Rate 100 BPM    Atrial Rate 100 BPM    P-R Interval 162 ms    QRS Duration 82 ms    Q-T Interval 336 ms    QTC Calculation (Bezet) 433 ms    Calculated P Axis 18 degrees    Calculated R Axis 4 degrees    Calculated T Axis 3 degrees    Diagnosis       Normal sinus rhythm  Nonspecific T wave abnormality  Abnormal ECG  When compared with ECG of 11-APR-2018 13:17,  Vent. rate has increased BY  34 BPM  Nonspecific T wave abnormality now evident in Anterolateral leads         Radiologic Studies -     RADIOLOGY FINDINGS  Chest X-ray shows no acute process  Pending review by Radiologist  Recorded by Marysol Sylvester, ED Scribe, as dictated by Aneudy Moctezuma PA-C    XR CHEST PORT    (Results Pending)     CT Results  (Last 48 hours)    None        CXR Results  (Last 48 hours)    None            Medical Decision Making   I am the first provider for this patient.     I reviewed the vital signs, available nursing notes, past medical history, past surgical history, family history and social history. Vital Signs-Reviewed the patient's vital signs. Pulse Oximetry Analysis - 100% on RA     Cardiac Monitor:  Rate: 110 bpm  Rhythm: tachycardia    EKG interpretation: (Preliminary)  5:04 PM   100 NSR non specific T wave abnormality  EKG read by Theodora Noonan PA-C at 5:06 PM    Records Reviewed: Nursing Notes    Provider Notes (Medical Decision Making):     Procedures:  Procedures    ED Course:   4:16 PM Initial assessment performed. The patients presenting problems have been discussed, and they are in agreement with the care plan formulated and outlined with them. I have encouraged them to ask questions as they arise throughout their visit. 6:45 PM Discussed patient's history, exam, and available diagnostics results with Alejandra Nielsen MD, ED Attending, who reviewed labs and agrees with admission and tx for pyelonephritis. 6:49 PM Discussed patient's history, exam, and available diagnostics results with Dr. Radha Pearce, neurosurgery, who is aware of the pt and will evaluate him after admission. 7:27 PM Discussed patient's history, exam, and available diagnostics results with Je Knowles MD, internal medicine, who agrees to admit to medical.    Diagnosis and Disposition     Critical Care Time: None    Core Measures:  For Hospitalized Patients:    1. Hospitalization Decision Time:  The decision to hospitalize the patient was made by Theodora Noonan PA-C at 6:45 PM on 6/29/2018    2. Aspirin: Aspirin was not given because the patient did not present with a stroke at the time of their Emergency Department evaluation    7:36 PM  Patient is being admitted to the hospital by Je Knowles MD. The results of their tests and reasons for their admission have been discussed with them and/or available family.  They convey agreement and understanding for the need to be admitted and for their admission diagnosis. CONDITIONS ON ADMISSION:  Sepsis is not present at the time of admission. Deep Vein Thrombosis is not present at the time of admission. Thrombosis is not present at the time of admission. Urinary Tract Infection is present at the time of admission. Pneumonia is not present at the time of admission. MRSA is not present at the time of admission. Wound infection is not present at the time of admission. Pressure Ulcer is not present at the time of admission. CLINICAL IMPRESSION:    1. Fever in adult    2. Pyelonephritis    3. History of spinal surgery      PLAN:  1. Admit to medical  ____________________________    Attestations: This note is prepared by Ciro Dumont and Quintin Bullard, acting as Scribe for John Chauhan PA-C. John Chauhan PA-C:  The scribe's documentation has been prepared under my direction and personally reviewed by me in its entirety.   I confirm that the note above accurately reflects all work, treatment, procedures, and medical decision making performed by me.  _______________________________

## 2018-06-29 NOTE — ED TRIAGE NOTES
Pt with spinal fusion on 6/13. Pt now with fever as high as 102 at home. Pt sent her by Dr. Saige Larsen for evaluation.

## 2018-06-29 NOTE — IP AVS SNAPSHOT
[address] [address] 
[phone] Patient: Kylah Flores MRN: [id number] HMO:1/28/5755 A check michelle indicates which time of day the medication should be taken. My Medications START taking these medications Instructions Each Dose to Equal  
 Morning Noon Evening Bedtime  
 levoFLOXacin 500 mg tablet Commonly known as:  Rue Quinton Your last dose was: Your next dose is: Take 1 Tab by mouth daily. 500 mg Saccharomyces boulardii 250 mg capsule Commonly known as:  Frantz Controls Your last dose was: Your next dose is: Take 2 Caps by mouth two (2) times a day for 7 days. 500 mg  
    
   
   
   
  
 tamsulosin 0.4 mg capsule Commonly known as:  FLOMAX Your last dose was: Your next dose is: Take 1 Cap by mouth nightly. 0.4 mg  
    
   
   
   
  
  
CONTINUE taking these medications Instructions Each Dose to Equal  
 Morning Noon Evening Bedtime COLACE 100 mg capsule Generic drug:  docusate sodium Your last dose was: Your next dose is: Take 100 mg by mouth two (2) times a day. 100 mg  
    
   
   
   
  
 GAS-X 80 mg chewable tablet Generic drug:  simethicone Your last dose was: Your next dose is: Take 80 mg by mouth every six (6) hours as needed for Flatulence. 80 mg  
    
   
   
   
  
 JANUVIA 50 mg tablet Generic drug:  SITagliptin Your last dose was: Your next dose is: Take 50 mg by mouth daily. 50 mg  
    
   
   
   
  
 lactulose 10 gram/15 mL solution Commonly known as:  Maximiano Ros Your last dose was: Your next dose is: Take 45 mL by mouth two (2) times a day. 30 g  
    
   
   
   
  
 lisinopril 20 mg tablet Commonly known as:  Hallie Valencia  
   
 Your last dose was: Your next dose is: Take 20 mg by mouth nightly. 20 mg  
    
   
   
   
  
 lubiPROStone 8 mcg capsule Commonly known as:  Avery Kennedy Your last dose was: Your next dose is: Take 1 Cap by mouth two (2) times daily (with meals). Indications: OPIOID-INDUCED CONSTIPATION  
 8 mcg LYRICA 100 mg capsule Generic drug:  pregabalin Your last dose was: Your next dose is: Take 25 mg by mouth two (2) times a day. 25 mg  
    
   
   
   
  
 metFORMIN 500 mg tablet Commonly known as:  GLUCOPHAGE Your last dose was: Your next dose is: Take 1,000 mg by mouth two (2) times daily (with meals). 1000 mg MIRALAX 17 gram packet Generic drug:  polyethylene glycol Your last dose was: Your next dose is: Take 17 g by mouth daily. 17 g  
    
   
   
   
  
 oxyCODONE-acetaminophen 5-325 mg per tablet Commonly known as:  PERCOCET Your last dose was: Your next dose is: Take 1 Tab by mouth every four (4) hours as needed. Max Daily Amount: 6 Tabs. 1 Tab  
    
   
   
   
  
 simvastatin 20 mg tablet Commonly known as:  ZOCOR Your last dose was: Your next dose is: Take  by mouth nightly. STOP taking these medications   
 morphine CR 60 mg CR tablet Commonly known as:  MS CONTIN Where to Get Your Medications These medications were sent to [facility] [address], [address] [place] [address] Street  Luis Villafuerte [address] [place] [address] Phone:  [phone]  
  levoFLOXacin 500 mg tablet Saccharomyces boulardii 250 mg capsule  
 tamsulosin 0.4 mg capsule

## 2018-06-29 NOTE — Clinical Note
Status[de-identified] Inpatient [101] Type of Bed: Medical [8] Inpatient Hospitalization Certified Necessary for the Following Reasons: 3. Patient receiving treatment that can only be provided in an inpatient setting (further clarification in H&P documentation) Admitting Diagnosis: Pyelonephritis [627056] Admitting Physician: Baudilio Kay [[id number]] Attending Physician: Baudilio Kay [[id number]] Estimated Length of Stay: 2 Midnights Discharge Plan[de-identified] Home with Office Follow-up

## 2018-06-30 PROBLEM — R65.10 SIRS (SYSTEMIC INFLAMMATORY RESPONSE SYNDROME) (HCC): Status: ACTIVE | Noted: 2018-06-30

## 2018-06-30 PROBLEM — E11.65 TYPE II OR UNSPECIFIED TYPE DIABETES MELLITUS WITH RENAL MANIFESTATIONS, UNCONTROLLED(250.42) (HCC): Status: ACTIVE | Noted: 2018-06-29

## 2018-06-30 PROBLEM — E11.29 TYPE II OR UNSPECIFIED TYPE DIABETES MELLITUS WITH RENAL MANIFESTATIONS, UNCONTROLLED(250.42) (HCC): Status: ACTIVE | Noted: 2018-06-29

## 2018-06-30 LAB
GLUCOSE BLD STRIP.AUTO-MCNC: 116 MG/DL (ref 70–110)
GLUCOSE BLD STRIP.AUTO-MCNC: 134 MG/DL (ref 70–110)
GLUCOSE BLD STRIP.AUTO-MCNC: 159 MG/DL (ref 70–110)
GLUCOSE BLD STRIP.AUTO-MCNC: 192 MG/DL (ref 70–110)
LACTATE SERPL-SCNC: 0.9 MMOL/L (ref 0.4–2)

## 2018-06-30 PROCEDURE — 74011636637 HC RX REV CODE- 636/637: Performed by: INTERNAL MEDICINE

## 2018-06-30 PROCEDURE — 82962 GLUCOSE BLOOD TEST: CPT

## 2018-06-30 PROCEDURE — 65270000029 HC RM PRIVATE

## 2018-06-30 PROCEDURE — 74011250637 HC RX REV CODE- 250/637: Performed by: INTERNAL MEDICINE

## 2018-06-30 PROCEDURE — 74011250636 HC RX REV CODE- 250/636

## 2018-06-30 PROCEDURE — 74011250636 HC RX REV CODE- 250/636: Performed by: FAMILY MEDICINE

## 2018-06-30 PROCEDURE — 74011250636 HC RX REV CODE- 250/636: Performed by: INTERNAL MEDICINE

## 2018-06-30 PROCEDURE — 97161 PT EVAL LOW COMPLEX 20 MIN: CPT

## 2018-06-30 PROCEDURE — 97116 GAIT TRAINING THERAPY: CPT

## 2018-06-30 PROCEDURE — 87040 BLOOD CULTURE FOR BACTERIA: CPT | Performed by: INTERNAL MEDICINE

## 2018-06-30 PROCEDURE — 36415 COLL VENOUS BLD VENIPUNCTURE: CPT | Performed by: INTERNAL MEDICINE

## 2018-06-30 PROCEDURE — 74011250637 HC RX REV CODE- 250/637

## 2018-06-30 PROCEDURE — 74011250637 HC RX REV CODE- 250/637: Performed by: FAMILY MEDICINE

## 2018-06-30 RX ORDER — DOCUSATE SODIUM 100 MG/1
CAPSULE, LIQUID FILLED ORAL
Status: COMPLETED
Start: 2018-06-30 | End: 2018-06-30

## 2018-06-30 RX ORDER — POLYETHYLENE GLYCOL 3350 17 G/17G
POWDER, FOR SOLUTION ORAL
Status: COMPLETED
Start: 2018-06-30 | End: 2018-06-30

## 2018-06-30 RX ORDER — SODIUM CHLORIDE 9 MG/ML
75 INJECTION, SOLUTION INTRAVENOUS CONTINUOUS
Status: DISCONTINUED | OUTPATIENT
Start: 2018-06-30 | End: 2018-07-02 | Stop reason: HOSPADM

## 2018-06-30 RX ORDER — PREGABALIN 25 MG/1
CAPSULE ORAL
Status: COMPLETED
Start: 2018-06-30 | End: 2018-06-30

## 2018-06-30 RX ORDER — LUBIPROSTONE 24 UG/1
CAPSULE, GELATIN COATED ORAL
Status: COMPLETED
Start: 2018-06-30 | End: 2018-06-30

## 2018-06-30 RX ORDER — DOCUSATE SODIUM 100 MG/1
CAPSULE, LIQUID FILLED ORAL
Status: DISPENSED
Start: 2018-06-30 | End: 2018-06-30

## 2018-06-30 RX ORDER — HEPARIN SODIUM 5000 [USP'U]/ML
INJECTION, SOLUTION INTRAVENOUS; SUBCUTANEOUS
Status: COMPLETED
Start: 2018-06-30 | End: 2018-06-30

## 2018-06-30 RX ORDER — SAME BUTANEDISULFONATE/BETAINE 400-600 MG
500 POWDER IN PACKET (EA) ORAL 2 TIMES DAILY
Status: DISCONTINUED | OUTPATIENT
Start: 2018-06-30 | End: 2018-07-02 | Stop reason: HOSPADM

## 2018-06-30 RX ORDER — SIMVASTATIN 20 MG/1
TABLET, FILM COATED ORAL
Status: COMPLETED
Start: 2018-06-30 | End: 2018-06-30

## 2018-06-30 RX ORDER — CHOLECALCIFEROL (VITAMIN D3) 125 MCG
10 CAPSULE ORAL
Status: DISCONTINUED | OUTPATIENT
Start: 2018-06-30 | End: 2018-07-02 | Stop reason: HOSPADM

## 2018-06-30 RX ADMIN — Medication 10 MG: at 22:37

## 2018-06-30 RX ADMIN — PREGABALIN 25 MG: 25 CAPSULE ORAL at 00:49

## 2018-06-30 RX ADMIN — SIMVASTATIN 20 MG: 20 TABLET, FILM COATED ORAL at 01:00

## 2018-06-30 RX ADMIN — HEPARIN SODIUM 5000 UNITS: 5000 INJECTION, SOLUTION INTRAVENOUS; SUBCUTANEOUS at 06:17

## 2018-06-30 RX ADMIN — HEPARIN SODIUM 5000 UNITS: 5000 INJECTION, SOLUTION INTRAVENOUS; SUBCUTANEOUS at 12:24

## 2018-06-30 RX ADMIN — POLYETHYLENE GLYCOL 3350 17 G: 17 POWDER, FOR SOLUTION ORAL at 01:00

## 2018-06-30 RX ADMIN — POLYETHYLENE GLYCOL 3350 17 G: 17 POWDER, FOR SOLUTION ORAL at 08:52

## 2018-06-30 RX ADMIN — LUBIPROSTONE 24 MCG: 24 CAPSULE, GELATIN COATED ORAL at 08:48

## 2018-06-30 RX ADMIN — LISINOPRIL 20 MG: 20 TABLET ORAL at 00:49

## 2018-06-30 RX ADMIN — LUBIPROSTONE 24 MCG: 24 CAPSULE, GELATIN COATED ORAL at 18:02

## 2018-06-30 RX ADMIN — POLYETHYLENE GLYCOL 3350 17 G: 17 POWDER, FOR SOLUTION ORAL at 00:50

## 2018-06-30 RX ADMIN — DOCUSATE SODIUM 100 MG: 100 CAPSULE, LIQUID FILLED ORAL at 01:00

## 2018-06-30 RX ADMIN — DOCUSATE SODIUM 100 MG: 100 CAPSULE, LIQUID FILLED ORAL at 00:50

## 2018-06-30 RX ADMIN — SODIUM CHLORIDE 75 ML/HR: 900 INJECTION, SOLUTION INTRAVENOUS at 15:07

## 2018-06-30 RX ADMIN — INSULIN LISPRO 2 UNITS: 100 INJECTION, SOLUTION INTRAVENOUS; SUBCUTANEOUS at 21:40

## 2018-06-30 RX ADMIN — SITAGLIPTIN 50 MG: 25 TABLET, FILM COATED ORAL at 09:00

## 2018-06-30 RX ADMIN — LACTULOSE 30 G: 10 SOLUTION ORAL at 01:00

## 2018-06-30 RX ADMIN — POLYETHYLENE GLYCOL 3350 17 G: 17 POWDER, FOR SOLUTION ORAL at 21:14

## 2018-06-30 RX ADMIN — LACTULOSE 30 G: 20 SOLUTION ORAL at 21:14

## 2018-06-30 RX ADMIN — INSULIN LISPRO 2 UNITS: 100 INJECTION, SOLUTION INTRAVENOUS; SUBCUTANEOUS at 12:23

## 2018-06-30 RX ADMIN — PREGABALIN 25 MG: 25 CAPSULE ORAL at 09:24

## 2018-06-30 RX ADMIN — PREGABALIN 25 MG: 25 CAPSULE ORAL at 21:15

## 2018-06-30 RX ADMIN — TAMSULOSIN HYDROCHLORIDE 0.4 MG: 0.4 CAPSULE ORAL at 21:15

## 2018-06-30 RX ADMIN — LACTULOSE 30 G: 20 SOLUTION ORAL at 08:50

## 2018-06-30 RX ADMIN — DOCUSATE SODIUM 100 MG: 100 CAPSULE, LIQUID FILLED ORAL at 21:14

## 2018-06-30 RX ADMIN — DOCUSATE SODIUM 100 MG: 100 CAPSULE, LIQUID FILLED ORAL at 08:45

## 2018-06-30 RX ADMIN — HEPARIN SODIUM 5000 UNITS: 5000 INJECTION, SOLUTION INTRAVENOUS; SUBCUTANEOUS at 21:15

## 2018-06-30 RX ADMIN — Medication 500 MG: at 21:14

## 2018-06-30 RX ADMIN — OXYCODONE HYDROCHLORIDE AND ACETAMINOPHEN 2 TABLET: 5; 325 TABLET ORAL at 21:40

## 2018-06-30 RX ADMIN — LACTULOSE 30 G: 20 SOLUTION ORAL at 01:00

## 2018-06-30 RX ADMIN — LISINOPRIL 20 MG: 20 TABLET ORAL at 21:15

## 2018-06-30 RX ADMIN — DIPHENHYDRAMINE HYDROCHLORIDE 12.5 MG: 50 INJECTION, SOLUTION INTRAMUSCULAR; INTRAVENOUS at 21:11

## 2018-06-30 RX ADMIN — LEVOFLOXACIN 500 MG: 5 INJECTION, SOLUTION INTRAVENOUS at 21:11

## 2018-06-30 RX ADMIN — SIMVASTATIN 20 MG: 20 TABLET, FILM COATED ORAL at 21:15

## 2018-06-30 RX ADMIN — Medication 500 MG: at 12:24

## 2018-06-30 NOTE — ED NOTES
Attempted to call report. Med surg stated that all nurses were busy obtaining shift report and could not take report on pt yet.

## 2018-06-30 NOTE — PROGRESS NOTES
0720-received report from [facility] included SBAR MAR and Kardex. Shift summary-no change in pt status. Bedside and Verbal shift change report given to Lisa Mercado RN (oncoming nurse) by Klaudia An RN (offgoing nurse). Report included the following information SBAR, Kardex and MAR.

## 2018-06-30 NOTE — ROUTINE PROCESS
TRANSFER - OUT REPORT:    Verbal report given to DASHAWN Wheatley RN (name) on Jaqueline Mojica  being transferred to [facility] Surg room 306 (unit) for routine progression of care       Report consisted of patients Situation, Background, Assessment and   Recommendations(SBAR). Information from the following report(s) SBAR and ED Summary was reviewed with the receiving nurse. Lines:   Peripheral IV 06/29/18 Left Antecubital (Active)   Site Assessment Clean, dry, & intact 6/29/2018  4:42 PM   Phlebitis Assessment 0 6/29/2018  4:42 PM   Infiltration Assessment 0 6/29/2018  4:42 PM   Dressing Status Clean, dry, & intact 6/29/2018  4:42 PM   Dressing Type Tape;Transparent 6/29/2018  4:42 PM   Hub Color/Line Status Pink;Flushed 6/29/2018  4:42 PM   Action Taken Blood drawn 6/29/2018  4:42 PM       Peripheral IV 06/29/18 Right Antecubital (Active)   Site Assessment Clean, dry, & intact 6/29/2018  4:00 PM   Phlebitis Assessment 0 6/29/2018  4:00 PM   Infiltration Assessment 0 6/29/2018  4:00 PM   Dressing Status Clean, dry, & intact 6/29/2018  4:00 PM   Dressing Type Transparent 6/29/2018  4:00 PM        Opportunity for questions and clarification was provided.       Patient transported with:   [employer]

## 2018-06-30 NOTE — H&P
History & Physical    Patient: Mary Case MRN: [id number]  CSN: [id number]    YOB: 1955  Age: 65 y.o. Sex: male      DOA: 6/29/2018    Chief Complaint:   Chief Complaint   Patient presents with    Post-Op Problem    Fever          HPI:     Mary Case is a 65 y.o.  male who has hx of DM, renal calculi, and renal cyst   Presents post laminectomy 2 weeks ago with Fever urinary frequency abdominal pain   And constipation ; he has been taking between 4 -6 percocet a day for chronic back pain and numbness  With hx of constipation prolonging hospital discharge   Constipation regimen includes :AMitzia, Karime lax, Doculax, lactulose with no improvement in BM  Appetite  has been poor as well but denies loss of urine or fecal incontinence. In ER found to have fever of 101, leukocytosis and UTI  Asked to admit for further evaluation.        Past Medical History:   Diagnosis Date    Adverse effect of anesthesia 2018    hoarseness  after recent ACDF surgery, some difficulty swallowing    Arthritis     Cancer ([address]     Basal cell skin cancers    Chronic low back pain     Diabetic neuropathy (HCC)     DM (diabetes mellitus) (HCC)     GERD (gastroesophageal reflux disease)     Hematuria     HTN (hypertension)     Hyperlipidemia     Pyogenic granuloma of skin and subcutaneous tissue     Thyroid disease 1979    Varicella        Past Surgical History:   Procedure Laterality Date    HX APPENDECTOMY      HX BACK SURGERY  2018    cervical    HX ENDOSCOPY      HX HEENT      partial thyroidectomy    HX UROLOGICAL  2010's    Cystoscopy, ureteroscopy with stent placement    NEUROLOGICAL PROCEDURE UNLISTED      Epidural steroid injections       Family History   Problem Relation Age of Onset    Cancer Father     Cancer Mother     Diabetes Mother        Social History     Social History    Marital status:      Spouse name: N/A    Number of children: N/A    Years of education: N/A Social History Main Topics    Smoking status: Never Smoker    Smokeless tobacco: Never Used    Alcohol use No    Drug use: No    Sexual activity: Yes     Partners: Female     Other Topics Concern    None     Social History Narrative       Prior to Admission medications    Medication Sig Start Date End Date Taking? Authorizing Provider   docusate sodium (COLACE) 100 mg capsule Take 100 mg by mouth two (2) times a day. Yes Sheela Ace MD   simethicone (GAS-X) 80 mg chewable tablet Take 80 mg by mouth every six (6) hours as needed for Flatulence. Yes Sheela Ace MD   polyethylene glycol (MIRALAX) 17 gram packet Take 17 g by mouth daily. Yes Sheela Ace MD   pregabalin (LYRICA) 100 mg capsule Take 25 mg by mouth two (2) times a day. Yes Sheela Ace MD   oxyCODONE-acetaminophen (PERCOCET) 5-325 mg per tablet Take 1 Tab by mouth every four (4) hours as needed. Max Daily Amount: 6 Tabs. 6/15/18  Yes Jak Davis MD   SITagliptin (JANUVIA) 50 mg tablet Take 50 mg by mouth daily. Yes Historical Provider   lisinopril (PRINIVIL, ZESTRIL) 20 mg tablet Take 20 mg by mouth nightly. Yes Historical Provider   metFORMIN (GLUCOPHAGE) 500 mg tablet Take 1,000 mg by mouth two (2) times daily (with meals). Yes Historical Provider   simvastatin (ZOCOR) 20 mg tablet Take  by mouth nightly. Yes Historical Provider   lubiPROStone (AMITIZA) 8 mcg capsule Take 1 Cap by mouth two (2) times daily (with meals). Indications: OPIOID-INDUCED CONSTIPATION 6/16/18   John Ahn MD   lactulose (CHRONULAC) 10 gram/15 mL solution Take 45 mL by mouth two (2) times a day. 6/16/18   John Ahn MD   morphine CR (MS CONTIN) 60 mg CR tablet Take 1 Tab by mouth every twelve (12) hours. Max Daily Amount: 120 mg. 6/15/18   Jak Davis MD       No Known Allergies      Review of Systems  GENERAL: Patient alert, awake and oriented times 3, able to communicate full sentences and not in distress.    HEENT: No change in vision, no earache, tinnitus, sore throat or sinus congestion. NECK: No pain or stiffness. PULMONARY: No shortness of breath, cough or wheeze. Cardiovascular: no pnd or orthopnea, no CP  GASTROINTESTINAL: No abdominal pain, nausea, vomiting or diarrhea, melena or bright red blood per rectum. GENITOURINARY: No urinary frequency, urgency, hesitancy or dysuria. MUSCULOSKELETAL: No joint or muscle pain, no back pain, no recent trauma. DERMATOLOGIC: No rash, no itching, no lesions. ENDOCRINE: No polyuria, polydipsia, no heat or cold intolerance. No recent change in weight. HEMATOLOGICAL: No anemia or easy bruising or bleeding. NEUROLOGIC: No headache, seizures, numbness, tingling or weakness. Physical Exam:     Physical Exam:  Visit Vitals    /74    Pulse 100    Temp (!) 100.8 °F (38.2 °C)    Resp 16    Ht 6' 2\" (1.88 m)    Wt 115.7 kg (255 lb)    SpO2 93%    BMI 32.74 kg/m2      O2 Device: Room air    Temp (24hrs), Av.2 °F (37.9 °C), Min:98.4 °F (36.9 °C), Max:101.2 °F (38.4 °C)             General:  Alert, cooperative, no distress, appears stated age. Head: Normocephalic, without obvious abnormality, atraumatic. Eyes:  Conjunctivae/corneas clear. PERRL, EOMs intact. Nose: Nares normal. No drainage or sinus tenderness. Neck: Supple, symmetrical, trachea midline, no adenopathy, thyroid: no enlargement, no carotid bruit and no JVD. Lungs:   Clear to auscultation bilaterally. Heart:  Regular rate and rhythm, S1, S2 normal.     Abdomen: Soft, non-tender. Bowel sounds normal.    Extremities: Extremities normal, atraumatic, no cyanosis or edema. Pulses: 2+ and symmetric all extremities. Skin:  No rashes or lesions incision clean dry intact mild erytehamous rash    Neurologic: AAOx3, No focal motor or sensory deficit. Labs Reviewed: All lab results for the last 24 hours reviewed. EKG.   Recent Results (from the past 24 hour(s))   URINALYSIS W/ RFLX MICROSCOPIC Collection Time: 06/29/18  3:43 PM   Result Value Ref Range    Color DARK YELLOW      Appearance CLOUDY      Specific gravity 1.023 1.005 - 1.030      pH (UA) 6.0 5.0 - 8.0      Protein 30 (A) NEG mg/dL    Glucose NEGATIVE  NEG mg/dL    Ketone TRACE (A) NEG mg/dL    Bilirubin NEGATIVE  NEG      Blood SMALL (A) NEG      Urobilinogen 1.0 0.2 - 1.0 EU/dL    Nitrites POSITIVE (A) NEG      Leukocyte Esterase LARGE (A) NEG     URINE MICROSCOPIC ONLY    Collection Time: 06/29/18  3:43 PM   Result Value Ref Range    WBC TOO NUMEROUS TO COUNT 0 - 5 /hpf    RBC 0 to 1 0 - 5 /hpf    Epithelial cells FEW 0 - 5 /lpf    Bacteria 4+ (A) NEG /hpf   LACTIC ACID    Collection Time: 06/29/18  4:43 PM   Result Value Ref Range    Lactic acid 1.3 0.4 - 2.0 MMOL/L   METABOLIC PANEL, COMPREHENSIVE    Collection Time: 06/29/18  4:43 PM   Result Value Ref Range    Sodium 135 (L) 136 - 145 mmol/L    Potassium 4.3 3.5 - 5.5 mmol/L    Chloride 98 (L) 100 - 108 mmol/L    CO2 28 21 - 32 mmol/L    Anion gap 9 3.0 - 18 mmol/L    Glucose 169 (H) 74 - 99 mg/dL    BUN 18 7.0 - 18 MG/DL    Creatinine 1.19 0.6 - 1.3 MG/DL    BUN/Creatinine ratio 15 12 - 20      GFR est AA >60 >60 ml/min/1.73m2    GFR est non-AA >60 >60 ml/min/1.73m2    Calcium 8.8 8.5 - 10.1 MG/DL    Bilirubin, total 1.1 (H) 0.2 - 1.0 MG/DL    ALT (SGPT) 17 16 - 61 U/L    AST (SGOT) 13 (L) 15 - 37 U/L    Alk.  phosphatase 75 45 - 117 U/L    Protein, total 7.6 6.4 - 8.2 g/dL    Albumin 3.2 (L) 3.4 - 5.0 g/dL    Globulin 4.4 (H) 2.0 - 4.0 g/dL    A-G Ratio 0.7 (L) 0.8 - 1.7     CBC WITH AUTOMATED DIFF    Collection Time: 06/29/18  4:43 PM   Result Value Ref Range    WBC 14.4 (H) 4.6 - 13.2 K/uL    RBC 4.30 (L) 4.70 - 5.50 M/uL    HGB 12.2 (L) 13.0 - 16.0 g/dL    HCT 37.5 36.0 - 48.0 %    MCV 87.2 74.0 - 97.0 FL    MCH 28.4 24.0 - 34.0 PG    MCHC 32.5 31.0 - 37.0 g/dL    RDW 13.0 11.6 - 14.5 %    PLATELET 833 611 - 339 K/uL    MPV 11.1 9.2 - 11.8 FL    NEUTROPHILS 79 (H) 40 - 73 % LYMPHOCYTES 12 (L) 21 - 52 %    MONOCYTES 9 3 - 10 %    EOSINOPHILS 0 0 - 5 %    BASOPHILS 0 0 - 2 %    ABS. NEUTROPHILS 11.2 (H) 1.8 - 8.0 K/UL    ABS. LYMPHOCYTES 1.8 0.9 - 3.6 K/UL    ABS. MONOCYTES 1.4 (H) 0.05 - 1.2 K/UL    ABS. EOSINOPHILS 0.0 0.0 - 0.4 K/UL    ABS. BASOPHILS 0.0 0.0 - 0.06 K/UL    DF AUTOMATED     EKG, 12 LEAD, INITIAL    Collection Time: 06/29/18  5:04 PM   Result Value Ref Range    Ventricular Rate 100 BPM    Atrial Rate 100 BPM    P-R Interval 162 ms    QRS Duration 82 ms    Q-T Interval 336 ms    QTC Calculation (Bezet) 433 ms    Calculated P Axis 18 degrees    Calculated R Axis 4 degrees    Calculated T Axis 3 degrees    Diagnosis       Normal sinus rhythm  Nonspecific T wave abnormality  Abnormal ECG  When compared with ECG of 11-APR-2018 13:17,  Vent. rate has increased BY  34 BPM  Nonspecific T wave abnormality now evident in Anterolateral leads         Procedures/imaging: see electronic medical records for all procedures/Xrays and details which were not copied into this note but were reviewed prior to creation of Plan      Assessment/Plan     Principal Problem:    Pyelonephritis (6/29/2018)    Active Problems:    Hematuria (1/7/2016)      Overview: a 62 y.o. male who presents today for  H/o stones   Referred for       microscopic hematuria    FOS  Good  , Nocturia n , dysuria n , frequency       nn , urgency n, hematuria no gross hematuria .  Has NIDDM  Scheduled for       CT urogram   Monday    Urinalysis sentara trace , small 2010   No fhx ACP       ,  Mother pancreatic CA       PSA   0.54              Cytology 7/16   Reactive,  Neg malignancy               KUB 7/8/16 Several left renal calculi are present in the midportion of the      left kidney with the largest measuring 1.5 cm x 1.1 cm. Nonobstructing 4 mm calculus is present in the upper pole of the      right kidney.             There is a subtle 8mm density projecting over the right sacral ala and there is no corresponding bone island on the CT scan. This may represent       bowel contents mimicking a small calculus. No calculus of this size is       present in the right kidney on the prior      CT scan 1/16             Constipated (6/16/2018)      Insulin dependent diabetes mellitus ([address] (6/16/2018)      Urinary retention (6/29/2018)      UTI (urinary tract infection) (6/29/2018)      Post laminectomy syndrome (6/29/2018)      DM (diabetes mellitus) ([address] (6/29/2018)      Constipation (6/29/2018)      Fever (6/29/2018)  Plan  Increase Amitizia increase yancy lax   Start soap suds enema  IV Levaquin  Daily obtain cultures  Low dose Flomax at [facility]        DVT/GI Prophylaxis: Hep SQ    Discussed with patient at bedside about hospital admission and my plan care, who understood and agree with my plan care.     Yamilex Hdz MD  6/29/2018 9:01 PM

## 2018-06-30 NOTE — ROUTINE PROCESS
21:00: Received pt as a transfer from ED at this time. Pt transported to unit via W/C with transportation staff and spouse in attendance. Pt was able to transfer from W/C to bed with minimal difficulty. Pt had a recent spinal fusion and is wearing a back brace which inhibits movement. Bowel sounds present as hyperactive. Abdomen presents as distended and tender. Pt denies C/O pain but states uncomfortable pressure R/T constipation, verbalizing \"I was only able to put out a small BM at home, I feel like I'm going to pop. \" During initial assessment radiology called as a STAT order was just placed for an abdominal X-Ray. This writer transported pt down to radiology at 22:20 to facilitate this procedure. 23:00: Following return from radiology pt was ordered and given a SSE. Pt tolerated procedure well and was able to hold fluid for thirty minutes before going to commode where he expelled an equal amount of brown tinged fluid with trace amounts of fecal matter evident. Pt was also ordered and given PO colace, miralax and chronulac.     06:15: Pt alternated between periods of restful sleep and wakefulness overnight. Pt reports \"running to the bathroom\" X5, and expelling what he described as being 'Fluids like diarrhea but not as thick. \" Bowel sounds at present remain hyperactive but not as profoundly audible as was the case upon initial examination. Denies C/O pain at this time.

## 2018-06-30 NOTE — PROGRESS NOTES
Hospitalist Progress Note    Patient: Feliz Valles MRN: [id number]  CSN: [id number]    YOB: 1955  Age: 65 y.o. Sex: male    DOA: 6/29/2018 LOS:  LOS: 1 day            Assessment/Plan     Principal Problem:    Pyelonephritis (6/29/2018)    Active Problems:    Hematuria (1/7/2016)      Overview: a 62 y.o. male who presents today for  H/o stones   Referred for       microscopic hematuria    FOS  Good  , Nocturia n , dysuria n , frequency       nn , urgency n, hematuria no gross hematuria .  Has NIDDM  Scheduled for       CT urogram   Monday    Urinalysis sentara trace , small 2010   No fhx ACP       ,  Mother pancreatic CA       PSA   0.54              Cytology 7/16   Reactive,  Neg malignancy               KUB 7/8/16 Several left renal calculi are present in the midportion of the      left kidney with the largest measuring 1.5 cm x 1.1 cm. Nonobstructing 4 mm calculus is present in the upper pole of the      right kidney. There is a subtle 8mm density projecting over the right sacral ala and       there is no corresponding bone island on the CT scan. This may represent       bowel contents mimicking a small calculus. No calculus of this size is       present in the right kidney on the prior      CT scan 1/16             Constipated (6/16/2018)      Insulin dependent diabetes mellitus ([address] (6/16/2018)      Urinary retention (6/29/2018)      UTI (urinary tract infection) (6/29/2018)      Post laminectomy syndrome (6/29/2018)      Type II or unspecified type diabetes mellitus with renal manifestations, uncontrolled(250.42) ([address] (6/29/2018)      Constipation (6/29/2018)      Fever (6/29/2018)      SIRS (systemic inflammatory response syndrome) ([address] (6/30/2018)      SIRS/fever/pyelonephritis/UTI: On sepsis protocol. IV levaquin, will f/u Urine cx and blood Cx. IVF    Constipation: On miralax and colace, lactulose.  Had BM this am, Will follow up abdominal X-ray, add florastor and gas X    Urinary retention: On flomax    Uncontrolled DM: Lantus and humalog    HTN: On lisinopril    S/P laminectomy 2 wks ago: post Op care. PT/OT, pain control    Full code    Remain hospital care    I have discussed at length with patient and his wife about his condition, Dx  and the treatment plan. 40 min's spent on total patient care including >50% on discussion with patient. Discussed with Care management. CC:  \" I am tired\"          Subjective:     Pt was seen and examined with the nurse in the morning round. Had fever this am. + fatigue and stomach bloating. Had BM early am.    His wife was at the bedside. Review of systems  General: + fevers / chills. Cardiovascular: No chest pain or pressure. No palpitations. Pulmonary: No cough, SOB  Gastrointestinal: + nausea, vomiting. Objective:      Visit Vitals    /75 (BP 1 Location: Right arm, BP Patient Position: At rest)    Pulse 98    Temp 98.6 °F (37 °C)    Resp 16    Ht 6' 2\" (1.88 m)    Wt 116.6 kg (257 lb 0.9 oz)    SpO2 94%    BMI 33 kg/m2       Physical Exam:    Gen: NAD, frail. Heent:  MMM, NC, AT. Cor: s1s2 RRR. No MRG. PMI mid 5th intercostal space. Resp:  CTA b/l. No w/r/r. Nml effort and diaphragmatic excursion. Abd:  NT ND.  BS positive. No rebound or guarding. No masses. Ext: No edema or cyanosis. Intake and Output:  Current Shift:     Last three shifts:  06/28 1901 - 06/30 0700  In: -   Out: 300 [Urine:300]    Labs: Results:       Chemistry Recent Labs      06/29/18   1643   GLU  169*   NA  135*   K  4.3   CL  98*   CO2  28   BUN  18   CREA  1.19   CA  8.8   AGAP  9   BUCR  15   AP  75   TP  7.6   ALB  3.2*   GLOB  4.4*   AGRAT  0.7*      CBC w/Diff Recent Labs      06/29/18   1643   WBC  14.4*   RBC  4.30*   HGB  12.2*   HCT  37.5   PLT  187   GRANS  79*   LYMPH  12*   EOS  0      Cardiac Enzymes No results for input(s): CPK, CKND1, GRECIA in the last 72 hours.     No lab exists for component: CKRMB, TROIP   Coagulation No results for input(s): PTP, INR, APTT in the last 72 hours. No lab exists for component: INREXT, INREXT    Lipid Panel No results found for: CHOL, CHOLPOCT, CHOLX, CHLST, CHOLV, 507618, HDL, LDL, LDLC, DLDLP, 557567, VLDLC, VLDL, TGLX, TRIGL, TRIGP, TGLPOCT, CHHD, CHHDX   BNP No results for input(s): BNPP in the last 72 hours.    Liver Enzymes Recent Labs      06/29/18   1643   TP  7.6   ALB  3.2*   AP  75   SGOT  13*      Thyroid Studies No results found for: T4, T3U, TSH, TSHEXT, TSHEXT     Procedures/imaging: see electronic medical records for all procedures/Xrays and details which were not copied into this note but were reviewed prior to creation of Plan      Medications Reviewed  Tyrell Rich MD

## 2018-06-30 NOTE — ROUTINE PROCESS
Bedside, Verbal and Written shift change report given to NILA Wagner RN (oncoming nurse) by Brandon Soto. Sameer Quintero (offgoing nurse). Report included the following information SBAR, Kardex, MAR and Recent Results.

## 2018-07-01 LAB
ANION GAP SERPL CALC-SCNC: 9 MMOL/L (ref 3–18)
BACTERIA SPEC CULT: ABNORMAL
BASOPHILS # BLD: 0 K/UL (ref 0–0.06)
BASOPHILS NFR BLD: 0 % (ref 0–2)
BUN SERPL-MCNC: 14 MG/DL (ref 7–18)
BUN/CREAT SERPL: 16 (ref 12–20)
CALCIUM SERPL-MCNC: 8.4 MG/DL (ref 8.5–10.1)
CHLORIDE SERPL-SCNC: 102 MMOL/L (ref 100–108)
CO2 SERPL-SCNC: 25 MMOL/L (ref 21–32)
CREAT SERPL-MCNC: 0.86 MG/DL (ref 0.6–1.3)
DIFFERENTIAL METHOD BLD: ABNORMAL
EOSINOPHIL # BLD: 0.2 K/UL (ref 0–0.4)
EOSINOPHIL NFR BLD: 2 % (ref 0–5)
ERYTHROCYTE [DISTWIDTH] IN BLOOD BY AUTOMATED COUNT: 12.8 % (ref 11.6–14.5)
GLUCOSE BLD STRIP.AUTO-MCNC: 145 MG/DL (ref 70–110)
GLUCOSE BLD STRIP.AUTO-MCNC: 151 MG/DL (ref 70–110)
GLUCOSE BLD STRIP.AUTO-MCNC: 183 MG/DL (ref 70–110)
GLUCOSE BLD STRIP.AUTO-MCNC: 225 MG/DL (ref 70–110)
GLUCOSE SERPL-MCNC: 131 MG/DL (ref 74–99)
HCT VFR BLD AUTO: 33.1 % (ref 36–48)
HGB BLD-MCNC: 10.7 G/DL (ref 13–16)
LYMPHOCYTES # BLD: 0.9 K/UL (ref 0.9–3.6)
LYMPHOCYTES NFR BLD: 13 % (ref 21–52)
MCH RBC QN AUTO: 28.2 PG (ref 24–34)
MCHC RBC AUTO-ENTMCNC: 32.3 G/DL (ref 31–37)
MCV RBC AUTO: 87.3 FL (ref 74–97)
MONOCYTES # BLD: 0.7 K/UL (ref 0.05–1.2)
MONOCYTES NFR BLD: 10 % (ref 3–10)
NEUTS SEG # BLD: 5 K/UL (ref 1.8–8)
NEUTS SEG NFR BLD: 75 % (ref 40–73)
PLATELET # BLD AUTO: 150 K/UL (ref 135–420)
PMV BLD AUTO: 11.1 FL (ref 9.2–11.8)
POTASSIUM SERPL-SCNC: 3.8 MMOL/L (ref 3.5–5.5)
RBC # BLD AUTO: 3.79 M/UL (ref 4.7–5.5)
SERVICE CMNT-IMP: ABNORMAL
SODIUM SERPL-SCNC: 136 MMOL/L (ref 136–145)
WBC # BLD AUTO: 6.7 K/UL (ref 4.6–13.2)

## 2018-07-01 PROCEDURE — 82962 GLUCOSE BLOOD TEST: CPT

## 2018-07-01 PROCEDURE — 80048 BASIC METABOLIC PNL TOTAL CA: CPT | Performed by: FAMILY MEDICINE

## 2018-07-01 PROCEDURE — 97116 GAIT TRAINING THERAPY: CPT

## 2018-07-01 PROCEDURE — 74011636637 HC RX REV CODE- 636/637: Performed by: INTERNAL MEDICINE

## 2018-07-01 PROCEDURE — 74011250637 HC RX REV CODE- 250/637: Performed by: INTERNAL MEDICINE

## 2018-07-01 PROCEDURE — 85025 COMPLETE CBC W/AUTO DIFF WBC: CPT | Performed by: FAMILY MEDICINE

## 2018-07-01 PROCEDURE — 36415 COLL VENOUS BLD VENIPUNCTURE: CPT | Performed by: FAMILY MEDICINE

## 2018-07-01 PROCEDURE — 65270000029 HC RM PRIVATE

## 2018-07-01 PROCEDURE — 74011250637 HC RX REV CODE- 250/637: Performed by: FAMILY MEDICINE

## 2018-07-01 PROCEDURE — 74011250636 HC RX REV CODE- 250/636: Performed by: INTERNAL MEDICINE

## 2018-07-01 RX ORDER — ACETAMINOPHEN 325 MG/1
650 TABLET ORAL
Status: DISCONTINUED | OUTPATIENT
Start: 2018-07-01 | End: 2018-07-02 | Stop reason: HOSPADM

## 2018-07-01 RX ADMIN — INSULIN LISPRO 2 UNITS: 100 INJECTION, SOLUTION INTRAVENOUS; SUBCUTANEOUS at 17:16

## 2018-07-01 RX ADMIN — LISINOPRIL 20 MG: 20 TABLET ORAL at 21:39

## 2018-07-01 RX ADMIN — PREGABALIN 25 MG: 25 CAPSULE ORAL at 08:36

## 2018-07-01 RX ADMIN — SIMVASTATIN 20 MG: 20 TABLET, FILM COATED ORAL at 21:39

## 2018-07-01 RX ADMIN — Medication 500 MG: at 21:18

## 2018-07-01 RX ADMIN — DOCUSATE SODIUM 100 MG: 100 CAPSULE, LIQUID FILLED ORAL at 21:18

## 2018-07-01 RX ADMIN — POLYETHYLENE GLYCOL 3350 17 G: 17 POWDER, FOR SOLUTION ORAL at 21:18

## 2018-07-01 RX ADMIN — SITAGLIPTIN 50 MG: 25 TABLET, FILM COATED ORAL at 08:36

## 2018-07-01 RX ADMIN — LUBIPROSTONE 24 MCG: 24 CAPSULE, GELATIN COATED ORAL at 08:36

## 2018-07-01 RX ADMIN — LACTULOSE 30 G: 20 SOLUTION ORAL at 08:36

## 2018-07-01 RX ADMIN — Medication 500 MG: at 08:36

## 2018-07-01 RX ADMIN — Medication 10 MG: at 21:18

## 2018-07-01 RX ADMIN — PREGABALIN 25 MG: 25 CAPSULE ORAL at 21:18

## 2018-07-01 RX ADMIN — INSULIN LISPRO 2 UNITS: 100 INJECTION, SOLUTION INTRAVENOUS; SUBCUTANEOUS at 21:38

## 2018-07-01 RX ADMIN — HEPARIN SODIUM 5000 UNITS: 5000 INJECTION, SOLUTION INTRAVENOUS; SUBCUTANEOUS at 12:15

## 2018-07-01 RX ADMIN — LUBIPROSTONE 24 MCG: 24 CAPSULE, GELATIN COATED ORAL at 17:15

## 2018-07-01 RX ADMIN — INSULIN LISPRO 4 UNITS: 100 INJECTION, SOLUTION INTRAVENOUS; SUBCUTANEOUS at 12:15

## 2018-07-01 RX ADMIN — LACTULOSE 30 G: 20 SOLUTION ORAL at 21:18

## 2018-07-01 RX ADMIN — HEPARIN SODIUM 5000 UNITS: 5000 INJECTION, SOLUTION INTRAVENOUS; SUBCUTANEOUS at 21:18

## 2018-07-01 RX ADMIN — LEVOFLOXACIN 500 MG: 5 INJECTION, SOLUTION INTRAVENOUS at 21:17

## 2018-07-01 RX ADMIN — TAMSULOSIN HYDROCHLORIDE 0.4 MG: 0.4 CAPSULE ORAL at 21:39

## 2018-07-01 RX ADMIN — DOCUSATE SODIUM 100 MG: 100 CAPSULE, LIQUID FILLED ORAL at 08:36

## 2018-07-01 RX ADMIN — HEPARIN SODIUM 5000 UNITS: 5000 INJECTION, SOLUTION INTRAVENOUS; SUBCUTANEOUS at 06:25

## 2018-07-01 RX ADMIN — POLYETHYLENE GLYCOL 3350 17 G: 17 POWDER, FOR SOLUTION ORAL at 08:35

## 2018-07-01 RX ADMIN — DIPHENHYDRAMINE HYDROCHLORIDE 12.5 MG: 50 INJECTION, SOLUTION INTRAMUSCULAR; INTRAVENOUS at 21:39

## 2018-07-01 NOTE — PROGRESS NOTES
Shift Summary: New orders received for Melatonin to help patient sleep. Patient states that he has not been able to sleep for a couple of days. Patient was able to rest throughout shift with sleep aid. Patient encouraged to void in urinal for measurement and has been complaint.

## 2018-07-01 NOTE — PROGRESS NOTES
0735-received report from [address] [place] [address] included SBAR MAR and Kardex. Shift summary- no change in status, ambulating in hallway. Bedside and Verbal shift change report given to Hazel Lundberg RN (oncoming nurse) by Yulia Bright RN (offgoing nurse). Report included the following information SBAR, Kardex and MAR.

## 2018-07-01 NOTE — ROUTINE PROCESS
Bedside and Verbal shift change report given to Clover Lacey RN (oncoming nurse) by Haja Merino RN (offgoing nurse). Report included the following information SBAR, Kardex, ED Summary, Procedure Summary, Intake/Output, MAR, Recent Results and Med Rec Status.

## 2018-07-01 NOTE — PROGRESS NOTES
Problem: Mobility Impaired (Adult and Pediatric)  Goal: *Acute Goals and Plan of Care (Insert Text)  Physical Therapy Goals  Initiated 6/30/2018  to be met within 2-3 days  STG's:  1. Ambulation:  Ambulate 150 ft or greater with mod I using RW for home mobility at discharge. 2. Step Negotiation: Ascend/Descend 3-5 steps with CGA/supervision with HR for home navigation as indicated. Outcome: Progressing Towards Goal  physical Therapy TREATMENT    Patient: Bam Ace (65 y.o. male)  Date: 7/1/2018  Diagnosis: Pyelonephritis  UTI (urinary tract infection)  Fever  Constipation  Urinary retention  DM (diabetes mellitus) ([facility] [address]  Post laminectomy syndrome Pyelonephritis  Precautions: Fall (LSO)   Chart, physical therapy assessment, plan of care and goals were reviewed. ASSESSMENT:  Pt showing continues progress with mobility and increasing ambulation distance with RW. No LOB. Slightly accelerated tony. VCs to slow for safety. No reports of dizziness. Cont POC. Progression toward goals:  []      Improving appropriately and progressing toward goals  [x]      Improving slowly and progressing toward goals  []      Not making progress toward goals and plan of care will be adjusted     PLAN:  Patient continues to benefit from skilled intervention to address the above impairments. Continue treatment per established plan of care.   Discharge Recommendations:  Home Health  Further Equipment Recommendations for Discharge:  rolling walker     SUBJECTIVE:   Patient stated \" I've been getting up walking     OBJECTIVE DATA SUMMARY:   Critical Behavior:  Neurologic State: Alert  Orientation Level: Oriented X4  Cognition: Appropriate decision making, Appropriate safety awareness  Safety/Judgement: Awareness of environment, Fall prevention  Functional Mobility Training:  Bed Mobility:  Rolling: Independent  Supine to Sit: Independent  Sit to Supine: Independent  Scooting: Independent  Transfers:  Sit to Stand: Modified independent  Stand to Sit: Modified independent  Balance:  Sitting: Intact  Standing: Intact; With support  Ambulation/Gait Training:  Distance (ft): 300 Feet (ft)  Assistive Device: Walker, rolling;Gait belt;Brace/Splint  Ambulation - Level of Assistance: Stand-by assistance  Gait Abnormalities: Decreased step clearance  Interventions: Safety awareness training;Verbal cues    Pain:  Pain Scale 1: Numeric (0 - 10)  Pain Intensity 1: 0       Activity Tolerance:   After treatment:   [] Patient left in no apparent distress sitting up in chair  [x] Patient left in no apparent distress in bed  [x] Call bell left within reach  [] Nursing notified  [] Caregiver present  [] Bed alarm activated      Cynthia Simons PTA   Time Calculation: 11 mins

## 2018-07-01 NOTE — PROGRESS NOTES
Hospitalist Progress Note    Patient: Maeve Carter MRN: [id number]  CSN: [id number]    YOB: 1955  Age: 65 y.o. Sex: male    DOA: 6/29/2018 LOS:  LOS: 2 days            Assessment/Plan     Principal Problem:    Pyelonephritis (6/29/2018)    Active Problems:    Hematuria (1/7/2016)      Overview: a 62 y.o. male who presents today for  H/o stones   Referred for       microscopic hematuria    FOS  Good  , Nocturia n , dysuria n , frequency       nn , urgency n, hematuria no gross hematuria .  Has NIDDM  Scheduled for       CT urogram   Monday    Urinalysis sentara trace , small 2010   No fhx ACP       ,  Mother pancreatic CA       PSA   0.54              Cytology 7/16   Reactive,  Neg malignancy               KUB 7/8/16 Several left renal calculi are present in the midportion of the      left kidney with the largest measuring 1.5 cm x 1.1 cm. Nonobstructing 4 mm calculus is present in the upper pole of the      right kidney. There is a subtle 8mm density projecting over the right sacral ala and       there is no corresponding bone island on the CT scan. This may represent       bowel contents mimicking a small calculus. No calculus of this size is       present in the right kidney on the prior      CT scan 1/16             Constipated (6/16/2018)      Urinary retention (6/29/2018)      UTI (urinary tract infection) (6/29/2018)      Post laminectomy syndrome (6/29/2018)      Type II or unspecified type diabetes mellitus with renal manifestations, uncontrolled(250.42) ([address] (6/29/2018)      Constipation (6/29/2018)      Fever (6/29/2018)      SIRS (systemic inflammatory response syndrome) ([address] (6/30/2018)      SIRS/fever/pyelonephritis/UTI: On sepsis protocol. IV levaquin, Urine cx revealed with Enterobacter. Sensitive to Levaquin. Blood Cx stay neg. IVF      Chronic Constipation: On miralax and colace, lactulose.  Had BM this am, reviewed the abdominal X-ray, on florastor and gas X, be caution with narcotics    Urinary retention: On flomax    Uncontrolled DM: Lantus and humalog    HTN: On lisinopril    S/P laminectomy 2 wks ago: post Op care. PT/OT, pain control    Full code    Remain hospital care    Dispo: Possible home in 2 days. CC:  \" I am feeling a little better\"     Subjective:     Pt was seen and examined with the nurse in the morning round. Stay afebrile since yesterday am. + fatigue and stomach bloating. Had BM early am.    His wife was at the bedside. Review of systems  General: No fevers or chills. Cardiovascular: No chest pain or pressure. No palpitations. Pulmonary: No cough, SOB  Gastrointestinal: No nausea, vomiting. Objective:      Visit Vitals    /70 (BP 1 Location: Right arm, BP Patient Position: At rest)    Pulse 90    Temp 98.4 °F (36.9 °C)    Resp 20    Ht 6' 2\" (1.88 m)    Wt 115.7 kg (255 lb)    SpO2 96%    BMI 32.74 kg/m2       Physical Exam:    Gen: NAD, non-toxic. Heent:  MMM, NC, AT. Cor: s1s2 RRR. No MRG. PMI mid 5th intercostal space. Resp:  CTA b/l. No w/r/r. Nml effort and diaphragmatic excursion. Abd:  NT ND.  BS positive. No rebound or guarding. No masses. Ext: No edema or cyanosis.       Intake and Output:  Current Shift:  07/01 0701 - 07/01 1900  In: 240 [P.O.:240]  Out: -   Last three shifts:  06/29 1901 - 07/01 0700  In: -   Out: 700 [Urine:700]    Labs: Results:       Chemistry Recent Labs      07/01/18   0508  06/29/18   1643   GLU  131*  169*   NA  136  135*   K  3.8  4.3   CL  102  98*   CO2  25  28   BUN  14  18   CREA  0.86  1.19   CA  8.4*  8.8   AGAP  9  9   BUCR  16  15   AP   --   75   TP   --   7.6   ALB   --   3.2*   GLOB   --   4.4*   AGRAT   --   0.7*      CBC w/Diff Recent Labs      07/01/18   0508  06/29/18   1643   WBC  6.7  14.4*   RBC  3.79*  4.30*   HGB  10.7*  12.2*   HCT  33.1*  37.5   PLT  150  187   GRANS  75*  79*   LYMPH  13*  12*   EOS  2  0      Cardiac Enzymes No results for input(s): CPK, CKND1, GRECIA in the last 72 hours. No lab exists for component: CKRMB, TROIP   Coagulation No results for input(s): PTP, INR, APTT in the last 72 hours. No lab exists for component: INREXT    Lipid Panel No results found for: CHOL, CHOLPOCT, CHOLX, CHLST, CHOLV, 173170, HDL, LDL, LDLC, DLDLP, 039000, VLDLC, VLDL, TGLX, TRIGL, TRIGP, TGLPOCT, CHHD, CHHDX   BNP No results for input(s): BNPP in the last 72 hours.    Liver Enzymes Recent Labs      06/29/18   1643   TP  7.6   ALB  3.2*   AP  75   SGOT  13*      Thyroid Studies No results found for: T4, T3U, TSH, TSHEXT     Procedures/imaging: see electronic medical records for all procedures/Xrays and details which were not copied into this note but were reviewed prior to creation of Plan      Medications Reviewed  Jame Salazar MD

## 2018-07-02 VITALS
DIASTOLIC BLOOD PRESSURE: 75 MMHG | OXYGEN SATURATION: 94 % | RESPIRATION RATE: 18 BRPM | SYSTOLIC BLOOD PRESSURE: 120 MMHG | WEIGHT: 254.2 LBS | HEIGHT: 74 IN | HEART RATE: 80 BPM | TEMPERATURE: 98.4 F | BODY MASS INDEX: 32.62 KG/M2

## 2018-07-02 LAB
ANION GAP SERPL CALC-SCNC: 11 MMOL/L (ref 3–18)
BASOPHILS # BLD: 0 K/UL (ref 0–0.06)
BASOPHILS NFR BLD: 1 % (ref 0–2)
BUN SERPL-MCNC: 10 MG/DL (ref 7–18)
BUN/CREAT SERPL: 12 (ref 12–20)
CALCIUM SERPL-MCNC: 8.6 MG/DL (ref 8.5–10.1)
CHLORIDE SERPL-SCNC: 105 MMOL/L (ref 100–108)
CO2 SERPL-SCNC: 24 MMOL/L (ref 21–32)
CREAT SERPL-MCNC: 0.84 MG/DL (ref 0.6–1.3)
DIFFERENTIAL METHOD BLD: ABNORMAL
EOSINOPHIL # BLD: 0.1 K/UL (ref 0–0.4)
EOSINOPHIL NFR BLD: 3 % (ref 0–5)
ERYTHROCYTE [DISTWIDTH] IN BLOOD BY AUTOMATED COUNT: 12.6 % (ref 11.6–14.5)
GLUCOSE BLD STRIP.AUTO-MCNC: 156 MG/DL (ref 70–110)
GLUCOSE BLD STRIP.AUTO-MCNC: 202 MG/DL (ref 70–110)
GLUCOSE SERPL-MCNC: 136 MG/DL (ref 74–99)
HCT VFR BLD AUTO: 32.7 % (ref 36–48)
HGB BLD-MCNC: 10.6 G/DL (ref 13–16)
LYMPHOCYTES # BLD: 1.3 K/UL (ref 0.9–3.6)
LYMPHOCYTES NFR BLD: 33 % (ref 21–52)
MCH RBC QN AUTO: 28 PG (ref 24–34)
MCHC RBC AUTO-ENTMCNC: 32.4 G/DL (ref 31–37)
MCV RBC AUTO: 86.3 FL (ref 74–97)
MONOCYTES # BLD: 0.5 K/UL (ref 0.05–1.2)
MONOCYTES NFR BLD: 13 % (ref 3–10)
NEUTS SEG # BLD: 2.1 K/UL (ref 1.8–8)
NEUTS SEG NFR BLD: 50 % (ref 40–73)
PLATELET # BLD AUTO: 171 K/UL (ref 135–420)
PMV BLD AUTO: 11.3 FL (ref 9.2–11.8)
POTASSIUM SERPL-SCNC: 3.7 MMOL/L (ref 3.5–5.5)
RBC # BLD AUTO: 3.79 M/UL (ref 4.7–5.5)
SODIUM SERPL-SCNC: 140 MMOL/L (ref 136–145)
WBC # BLD AUTO: 4.1 K/UL (ref 4.6–13.2)

## 2018-07-02 PROCEDURE — 74011250636 HC RX REV CODE- 250/636: Performed by: INTERNAL MEDICINE

## 2018-07-02 PROCEDURE — 85025 COMPLETE CBC W/AUTO DIFF WBC: CPT | Performed by: FAMILY MEDICINE

## 2018-07-02 PROCEDURE — 36415 COLL VENOUS BLD VENIPUNCTURE: CPT | Performed by: FAMILY MEDICINE

## 2018-07-02 PROCEDURE — 74011250636 HC RX REV CODE- 250/636: Performed by: FAMILY MEDICINE

## 2018-07-02 PROCEDURE — 80048 BASIC METABOLIC PNL TOTAL CA: CPT | Performed by: FAMILY MEDICINE

## 2018-07-02 PROCEDURE — 74011636637 HC RX REV CODE- 636/637: Performed by: INTERNAL MEDICINE

## 2018-07-02 PROCEDURE — 97116 GAIT TRAINING THERAPY: CPT

## 2018-07-02 PROCEDURE — 82962 GLUCOSE BLOOD TEST: CPT

## 2018-07-02 PROCEDURE — 74011250637 HC RX REV CODE- 250/637: Performed by: FAMILY MEDICINE

## 2018-07-02 PROCEDURE — 74011250637 HC RX REV CODE- 250/637: Performed by: INTERNAL MEDICINE

## 2018-07-02 RX ORDER — SAME BUTANEDISULFONATE/BETAINE 400-600 MG
500 POWDER IN PACKET (EA) ORAL 2 TIMES DAILY
Qty: 28 CAP | Refills: 0 | Status: SHIPPED | OUTPATIENT
Start: 2018-07-02 | End: 2018-07-09

## 2018-07-02 RX ORDER — LEVOFLOXACIN 500 MG/1
500 TABLET, FILM COATED ORAL DAILY
Qty: 5 TAB | Refills: 0 | Status: SHIPPED | OUTPATIENT
Start: 2018-07-02 | End: 2020-05-08 | Stop reason: ALTCHOICE

## 2018-07-02 RX ORDER — TAMSULOSIN HYDROCHLORIDE 0.4 MG/1
0.4 CAPSULE ORAL
Qty: 30 CAP | Refills: 0 | Status: SHIPPED | OUTPATIENT
Start: 2018-07-02 | End: 2020-05-08

## 2018-07-02 RX ADMIN — SODIUM CHLORIDE 75 ML/HR: 900 INJECTION, SOLUTION INTRAVENOUS at 06:03

## 2018-07-02 RX ADMIN — Medication 500 MG: at 08:14

## 2018-07-02 RX ADMIN — SITAGLIPTIN 50 MG: 25 TABLET, FILM COATED ORAL at 09:03

## 2018-07-02 RX ADMIN — INSULIN LISPRO 2 UNITS: 100 INJECTION, SOLUTION INTRAVENOUS; SUBCUTANEOUS at 08:12

## 2018-07-02 RX ADMIN — HEPARIN SODIUM 5000 UNITS: 5000 INJECTION, SOLUTION INTRAVENOUS; SUBCUTANEOUS at 06:03

## 2018-07-02 RX ADMIN — POLYETHYLENE GLYCOL 3350 17 G: 17 POWDER, FOR SOLUTION ORAL at 08:15

## 2018-07-02 RX ADMIN — LACTULOSE 30 G: 20 SOLUTION ORAL at 09:01

## 2018-07-02 RX ADMIN — INSULIN LISPRO 4 UNITS: 100 INJECTION, SOLUTION INTRAVENOUS; SUBCUTANEOUS at 11:48

## 2018-07-02 RX ADMIN — PREGABALIN 25 MG: 25 CAPSULE ORAL at 08:15

## 2018-07-02 RX ADMIN — DOCUSATE SODIUM 100 MG: 100 CAPSULE, LIQUID FILLED ORAL at 08:15

## 2018-07-02 RX ADMIN — LUBIPROSTONE 24 MCG: 24 CAPSULE, GELATIN COATED ORAL at 08:14

## 2018-07-02 NOTE — PROGRESS NOTES
Reason for Readmission:     Post op fever         RRAT Score and Risk Level:     Mod; 13     Level of Readmission:    mod      Care Conference scheduled:   N/A       Resources/supports as identified by patient/family:   N/A       Top Challenges facing patient (as identified by  patient/family and CM): Finances/Medication cost?       Transportation        Support system or lack thereof? Living arrangements? Self-care/ADLs/Cognition? Current Advanced Directive/Advance Care Plan:             Plan for utilizing home health:   Current with [address] [facility]             Likelihood of additional readmission: Mod             Transition of Care Plan:    Based on readmission, the patient's previous Plan of Care   has been evaluated and/or modified. The current Transition of Care Plan is: [id number] [facility] 7/2/18    Pt admitted due to fever. Pt is s/p laminectomy approximately 2 weeks ago. Pt is current with [facility]. CM met with pt and his wife at bedside to discuss plan of care. Both have indicated they would like to continue with [facility]. CMS has been notified to assist.  Noted pt is to be discharged today with [facility] services. Pt's wife will transport pt home later today. No other plan of care needs have been identified. Care Management Interventions  PCP Verified by CM: Yes  Mode of Transport at Discharge:  Other (see comment) (Spouse)  Transition of Care Consult (CM Consult): [address]: Yes  Health Maintenance Reviewed: Yes  Physical Therapy Consult: Yes  Occupational Therapy Consult: Yes  Current Support Network: Lives with Spouse  Confirm Follow Up Transport: Family  Plan discussed with Pt/Family/Caregiver: Yes  Freedom of Choice Offered: Yes  Discharge Location  Dis[facility] Pl[facility]

## 2018-07-02 NOTE — PROGRESS NOTES
Problem: Mobility Impaired (Adult and Pediatric)  Goal: *Acute Goals and Plan of Care (Insert Text)  Physical Therapy Goals  Initiated 6/30/2018  to be met within 2-3 days  STG's:  1. Ambulation:  Ambulate 150 ft or greater with mod I using RW for home mobility at discharge. 2. Step Negotiation: Ascend/Descend 3-5 steps with CGA/supervision with HR for home navigation as indicated. Outcome: Progressing Towards Goal  physical Therapy TREATMENT/DISCHARGE    Patient: Gunner Amaro (26 y.o. male)  Date: 7/2/2018  Diagnosis: Pyelonephritis  UTI (urinary tract infection)  Fever  Constipation  Urinary retention  DM (diabetes mellitus) ([facility] [address]  Post laminectomy syndrome Pyelonephritis       Precautions: Fall (LSO)  Chart, physical therapy assessment, plan of care and goals were reviewed. ASSESSMENT:  Pt seen in supine prior to session w/ IV connected. Pt has met all goals at this time. Pt able to ambulate w/ RW/GB/LSO w/ no signs of LOB and min VCing needed. Pt able to perform step negotiation using the side step technique w/ R HR w/ no signs of LOB and min VCing. Pt left sitting at the EOB after session, call bell and tray in reach, nurse notified after session. Pt has met all goals at this time DC from acute PT. Progression toward goals:  [x]      Goals met  []      Improving appropriately and progressing toward goals  []      Improving slowly and progressing toward goals  []      Not making progress toward goals and plan of care will be adjusted     PLAN:  Patient will be discharged from physical therapy at this time. Rationale for discharge:  [x] Goals Achieved  [] [address]  [] Patient not participating in therapy  [] Other:  Discharge Recommendations:  Home Health  Further Equipment Recommendations for Discharge:  N/A     SUBJECTIVE:   Patient stated What's the difference between OT, PT, and speech?     OBJECTIVE DATA SUMMARY:   Critical Behavior:  Neurologic State: Alert  Orientation Level: Oriented X4  Cognition: Appropriate decision making, Appropriate for age attention/concentration, Appropriate safety awareness, Follows commands  Safety/Judgement: Awareness of environment, Fall prevention  Functional Mobility Training:  Bed Mobility:  Rolling: Independent  Supine to Sit: Independent  Sit to Supine: Independent  Scooting: Independent  Transfers:  Sit to Stand: Modified independent  Stand to Sit: Modified independent  Balance:  Sitting: Intact  Standing: Intact; With support  Ambulation/Gait Training:  Distance (ft): 400 Feet (ft)  Assistive Device: Brace/Splint;Gait belt;Walker, rolling  Ambulation - Level of Assistance: Modified independent  Gait Abnormalities: Decreased step clearance  Speed/Adriana: Slow  Step Length: Left shortened;Right shortened  Interventions: Safety awareness training; Tactile cues; Verbal cues  Stairs:  Number of Stairs Trained: 8  Stairs - Level of Assistance: Contact guard assistance   Rail Use: Right     Pain:  Pain Scale 1: Numeric (0 - 10)  Pain Intensity 1: 1  Pain Location 1: Back  Pain Orientation 1: Lower  Activity Tolerance:   Good  Please refer to the flowsheet for vital signs taken during this treatment. After treatment:   [] Patient left in no apparent distress sitting up in chair  [x] Patient left in no apparent distress in bed  [x] Call bell left within reach  [x] Nursing notified  [x] Caregiver present (spouse)  [] Bed alarm activated  Roma Guy PT   Time Calculation: 17 mins     Mobility:   Goal  CI= 1-19%  D/C  CI= 1-19%. The severity rating is based on the Other Based on functional assessment

## 2018-07-02 NOTE — ROUTINE PROCESS
Bedside and Verbal shift change report given to Aren Prieto RN (oncoming nurse) by Bruce Champion RN (offgoing nurse). Report included the following information SBAR, Kardex, ED Summary, Procedure Summary, Intake/Output, MAR, Recent Results and Med Rec Status.

## 2018-07-02 NOTE — DISCHARGE INSTRUCTIONS
Constipation: Care Instructions  Your Care Instructions    Constipation means that you have a hard time passing stools (bowel movements). People pass stools from 3 times a day to once every 3 days. What is normal for you may be different. Constipation may occur with pain in the rectum and cramping. The pain may get worse when you try to pass stools. Sometimes there are small amounts of bright red blood on toilet paper or the surface of stools. This is because of enlarged veins near the rectum (hemorrhoids). A few changes in your diet and lifestyle may help you avoid ongoing constipation. Your doctor may also prescribe medicine to help loosen your stool. Some medicines can cause constipation. These include pain medicines and antidepressants. Tell your doctor about all the medicines you take. Your doctor may want to make a medicine change to ease your symptoms. Follow-up care is a key part of your treatment and safety. Be sure to make and go to all appointments, and call your doctor if you are having problems. It's also a good idea to know your test results and keep a list of the medicines you take. How can you care for yourself at home? · Drink plenty of fluids, enough so that your urine is light yellow or clear like water. If you have kidney, heart, or liver disease and have to limit fluids, talk with your doctor before you increase the amount of fluids you drink. · Include high-fiber foods in your diet each day. These include fruits, vegetables, beans, and whole grains. · Get at least 30 minutes of exercise on most days of the week. Walking is a good choice. You also may want to do other activities, such as running, swimming, cycling, or playing tennis or team sports. · Take a fiber supplement, such as Citrucel or Metamucil, every day. Read and follow all instructions on the label. · Schedule time each day for a bowel movement. A daily routine may help.  Take your time having your bowel movement. · Support your feet with a small step stool when you sit on the toilet. This helps flex your hips and places your pelvis in a squatting position. · Your doctor may recommend an over-the-counter laxative to relieve your constipation. Examples are Milk of Magnesia and MiraLax. Read and follow all instructions on the label. Do not use laxatives on a long-term basis. When should you call for help? Call your doctor now or seek immediate medical care if:  ? · You have new or worse belly pain. ? · You have new or worse nausea or vomiting. ? · You have blood in your stools. ? Watch closely for changes in your health, and be sure to contact your doctor if:  ? · Your constipation is getting worse. ? · You do not get better as expected. Where can you learn more? Go to http://neida-maryuri.info/. Enter 21 [phone] in the search box to learn more about \"Constipation: Care Instructions. \"  Current as of: March 20, 2017  Content Version: 11.4  © 7921-0678 [employer]. Care instructions adapted under license by [employer] (which disclaims liability or warranty for this information). If you have questions about a medical condition or this instruction, always ask your healthcare professional. [address] any warranty or liability for your use of this information. Constipation: Care Instructions  Your Care Instructions    Constipation means that you have a hard time passing stools (bowel movements). People pass stools from 3 times a day to once every 3 days. What is normal for you may be different. Constipation may occur with pain in the rectum and cramping. The pain may get worse when you try to pass stools. Sometimes there are small amounts of bright red blood on toilet paper or the surface of stools. This is because of enlarged veins near the rectum (hemorrhoids).   A few changes in your diet and lifestyle may help you avoid ongoing constipation. Your doctor may also prescribe medicine to help loosen your stool. Some medicines can cause constipation. These include pain medicines and antidepressants. Tell your doctor about all the medicines you take. Your doctor may want to make a medicine change to ease your symptoms. Follow-up care is a key part of your treatment and safety. Be sure to make and go to all appointments, and call your doctor if you are having problems. It's also a good idea to know your test results and keep a list of the medicines you take. How can you care for yourself at home? · Drink plenty of fluids, enough so that your urine is light yellow or clear like water. If you have kidney, heart, or liver disease and have to limit fluids, talk with your doctor before you increase the amount of fluids you drink. · Include high-fiber foods in your diet each day. These include fruits, vegetables, beans, and whole grains. · Get at least 30 minutes of exercise on most days of the week. Walking is a good choice. You also may want to do other activities, such as running, swimming, cycling, or playing tennis or team sports. · Take a fiber supplement, such as Citrucel or Metamucil, every day. Read and follow all instructions on the label. · Schedule time each day for a bowel movement. A daily routine may help. Take your time having your bowel movement. · Support your feet with a small step stool when you sit on the toilet. This helps flex your hips and places your pelvis in a squatting position. · Your doctor may recommend an over-the-counter laxative to relieve your constipation. Examples are Milk of Magnesia and MiraLax. Read and follow all instructions on the label. Do not use laxatives on a long-term basis. When should you call for help? Call your doctor now or seek immediate medical care if:  ? · You have new or worse belly pain. ? · You have new or worse nausea or vomiting. ? · You have blood in your stools. ? Watch closely for changes in your health, and be sure to contact your doctor if:  ? · Your constipation is getting worse. ? · You do not get better as expected. Where can you learn more? Go to http://neida-maryuri.info/. Enter 21 [phone] in the search box to learn more about \"Constipation: Care Instructions. \"  Current as of: March 20, 2017  Content Version: 11.4  © 4960-6442 [employer]. Care instructions adapted under license by [employer] (which disclaims liability or warranty for this information). If you have questions about a medical condition or this instruction, always ask your healthcare professional. [place] [address] any warranty or liability for your use of this information. Counting Carbohydrates When You Take Insulin: Care Instructions  Your Care Instructions    You don't have to eat special foods when you take insulin. You just have to be careful to eat healthy foods. And you have to spread throughout the day the carbohydrates you eat. Carbohydrates raise blood sugar higher and more quickly than any other nutrient. It is found in desserts, breads and cereals, and fruit. It's also found in starchy vegetables such as potatoes and corn, grains such as rice and pasta, and milk and yogurt. The more carbohydrates, or carbs, you eat at one time, the higher your blood sugar will rise. Spreading carbs throughout the day helps keep your blood sugar levels within your target range. Counting carbs is one of the best ways to keep your blood sugar under control when you use insulin. It helps you match the right amount of insulin to the number of grams of carbohydrates in a meal. You need to test your blood sugar several times a day to learn how carbs affect you. Then you can change your diet and insulin dose as needed. A registered dietitian or certified diabetes educator can help you plan meals and snacks.   Follow-up care is a key part of your treatment and safety. Be sure to make and go to all appointments, and call your doctor if you are having problems. It's also a good idea to know your test results and keep a list of the medicines you take. How can you care for yourself at home? Know your daily amount of carbohydrates  Your daily amount depends on several things, including your weight, how active you are, which diabetes medicines you take, and what your goals are for your blood sugar levels. A registered dietitian or certified diabetes educator can help you plan how many carbohydrates to include in each meal and snack. For most adults, a guideline for the daily amount of carbohydrates is:  · 45 to 60 grams at each meal. That's about the same as 3 to 4 carbohydrate servings. · 15 to 20 grams at each snack. That's about the same as 1 carbohydrate serving. Count carbs  If you take insulin, you need to know how many grams of carbohydrates are in a meal. This lets you know how much rapid-acting insulin to take before you eat. If you use an insulin pump, you get a constant rate of insulin during the day. So the pump must be programmed at meals to give you extra insulin to cover the rise in blood sugar after meals. When you know how many carbohydrates you will eat, you can take the right amount of insulin. Or, if you always use the same amount of insulin, you need to make sure that you eat the same amount of carbs at meals. · Learn your own insulin-to-carbohydrates ratio. You and your diabetes health professional will figure out the ratio. You can do this by testing your blood sugar after meals. For example, you may need a certain amount of insulin for every 15 grams of carbohydrates. · Add up the carbohydrate grams in a meal. Then you can figure out how many units of insulin to take based on your insulin-to-carbohydrates ratio. · Look at labels on packaged foods. This can tell you how many carbohydrates are in a serving.  You can also use guides from the American Diabetes Association. · Be aware of portions, or serving sizes. If a package has two servings and you eat the whole package, you need to double the number of grams of carbohydrates listed for one serving. · Protein, fat, and fiber do not raise blood sugar as much as carbs do. If you eat a lot of these nutrients in a meal, your blood sugar will rise more slowly than it would otherwise. · Exercise lowers blood sugar. You can use less insulin than you would if you were not doing exercise. Keep in mind that timing matters. If you exercise within 1 hour after a meal, your body may need less insulin for that meal than it would if you exercised 3 hours after the meal. Test your blood sugar to find out how exercise affects your need for insulin. Eat from all food groups  · Eat at least three meals a day. · Plan meals to include food from all the food groups. ¨ Grains: 1 slice of bread (1 ounce), ½ cup of cooked cereal, and 1/3 cup of cooked pasta or rice. These have about 15 grams of carbohydrates in a serving. Choose whole grains. These include whole wheat bread or crackers, oatmeal, and brown rice. Have them more often than refined grains. ¨ Fruit: 1 small fresh fruit, such as an apple or orange; ½ of a banana; ½ cup of chopped, cooked, or canned fruit; ½ cup of fruit juice; 1 cup of melon or raspberries; and 2 tablespoons of dried fruit. These have about 15 grams of carbohydrates in a serving. ¨ Dairy: 1 cup of nonfat or low-fat milk and 2/3 cup of plain yogurt. These have about 15 grams of carbohydrates in a serving. ¨ Protein foods: Beef, chicken, turkey, fish, eggs, tofu, cheese, cottage cheese, and peanut butter. A serving size of meat is 3 ounces. This is about the size of a deck of cards. Examples of meat substitute serving sizes (equal to 1 ounce of meat) are 1/4 cup of cottage cheese, 1 egg, 1 tablespoon of peanut butter, and ½ cup of tofu.  These have very little or no carbohydrates in a serving. ¨ Vegetables: Starchy vegetables such as ½ cup of cooked beans, peas, potatoes, or corn have about 15 grams of carbohydrates. Nonstarchy vegetables have very little carbohydrates. These include 1 cup of raw leafy vegetables (such as spinach), ½ cup of other vegetables (cooked or chopped), and 3/4 cup of vegetable juice. · Talk to your dietitian or diabetes educator about ways to add limited amounts of sweets into your meal plan. · If you drink alcohol:  ¨ Limit it to no more than 1 drink a day for women and 2 drinks a day for men. (One drink is 12 fl oz of beer, 5 fl oz of wine, or 1.5 fl oz liquor.)  ¨ Make sure to count drink mixers that have sugar in your total carbohydrate count. These include cola, tonic water, ancelmo mix, and fruit juice. ¨ Eat a carbohydrate food along with your alcoholic drink. ¨ Check your blood sugar more often. This is because alcohol can lower your blood sugar too much. This may happen even hours later while you sleep. You may want to eat and adjust your insulin dose when you drink alcohol to prevent severe low blood sugar. ¨ Talk to your doctor. Alcohol may not be recommended when you are taking certain diabetes medicines. Where can you learn more? Go to http://neida-maryuri.info/. Enter B519 in the search box to learn more about \"Counting Carbohydrates When You Take Insulin: Care Instructions. \"  Current as of: March 13, 2017  Content Version: 11.4  © 4602-8493 [employer]. Care instructions adapted under license by [employer] (which disclaims liability or warranty for this information). If you have questions about a medical condition or this instruction, always ask your healthcare professional. [place] [address] any warranty or liability for your use of this information. Learning About Diabetes Food Guidelines  Your Care Instructions    Meal planning is important to manage diabetes.  It helps keep your blood sugar at a target level (which you set with your doctor). You don't have to eat special foods. You can eat what your family eats, including sweets once in a while. But you do have to pay attention to how often you eat and how much you eat of certain foods. You may want to work with a dietitian or a certified diabetes educator (CDE) to help you plan meals and snacks. A dietitian or CDE can also help you lose weight if that is one of your goals. What should you know about eating carbs? Managing the amount of carbohydrate (carbs) you eat is an important part of healthy meals when you have diabetes. Carbohydrate is found in many foods. · Learn which foods have carbs. And learn the amounts of carbs in different foods. ¨ Bread, cereal, pasta, and rice have about 15 grams of carbs in a serving. A serving is 1 slice of bread (1 ounce), ½ cup of cooked cereal, or 1/3 cup of cooked pasta or rice. ¨ Fruits have 15 grams of carbs in a serving. A serving is 1 small fresh fruit, such as an apple or orange; ½ of a banana; ½ cup of cooked or canned fruit; ½ cup of fruit juice; 1 cup of melon or raspberries; or 2 tablespoons of dried fruit. ¨ Milk and no-sugar-added yogurt have 15 grams of carbs in a serving. A serving is 1 cup of milk or 2/3 cup of no-sugar-added yogurt. ¨ Starchy vegetables have 15 grams of carbs in a serving. A serving is ½ cup of mashed potatoes or sweet potato; 1 cup winter squash; ½ of a small baked potato; ½ cup of cooked beans; or ½ cup cooked corn or green peas. · Learn how much carbs to eat each day and at each meal. A dietitian or CDE can teach you how to keep track of the amount of carbs you eat. This is called carbohydrate counting. · If you are not sure how to count carbohydrate grams, use the Plate Method to plan meals. It is a good, quick way to make sure that you have a balanced meal. It also helps you spread carbs throughout the day. ¨ Divide your plate by types of foods.  Put non-starchy vegetables on half the plate, meat or other protein food on one-quarter of the plate, and a grain or starchy vegetable in the final quarter of the plate. To this you can add a small piece of fruit and 1 cup of milk or yogurt, depending on how many carbs you are supposed to eat at a meal.  · Try to eat about the same amount of carbs at each meal. Do not \"save up\" your daily allowance of carbs to eat at one meal.  · Proteins have very little or no carbs per serving. Examples of proteins are beef, chicken, turkey, fish, eggs, tofu, cheese, cottage cheese, and peanut butter. A serving size of meat is 3 ounces, which is about the size of a deck of cards. Examples of meat substitute serving sizes (equal to 1 ounce of meat) are 1/4 cup of cottage cheese, 1 egg, 1 tablespoon of peanut butter, and ½ cup of tofu. How can you eat out and still eat healthy? · Learn to estimate the serving sizes of foods that have carbohydrate. If you measure food at home, it will be easier to estimate the amount in a serving of restaurant food. · If the meal you order has too much carbohydrate (such as potatoes, corn, or baked beans), ask to have a low-carbohydrate food instead. Ask for a salad or green vegetables. · If you use insulin, check your blood sugar before and after eating out to help you plan how much to eat in the future. · If you eat more carbohydrate at a meal than you had planned, take a walk or do other exercise. This will help lower your blood sugar. What else should you know? · Limit saturated fat, such as the fat from meat and dairy products. This is a healthy choice because people who have diabetes are at higher risk of heart disease. So choose lean cuts of meat and nonfat or low-fat dairy products. Use olive or canola oil instead of butter or shortening when cooking. · Don't skip meals. Your blood sugar may drop too low if you skip meals and take insulin or certain medicines for diabetes.   · Check with your doctor before you drink alcohol. Alcohol can cause your blood sugar to drop too low. Alcohol can also cause a bad reaction if you take certain diabetes medicines. Follow-up care is a key part of your treatment and safety. Be sure to make and go to all appointments, and call your doctor if you are having problems. It's also a good idea to know your test results and keep a list of the medicines you take. Where can you learn more? Go to http://neida-maryuri.info/. Enter Q316 in the search box to learn more about \"Learning About Diabetes Food Guidelines. \"  Current as of: March 13, 2017  Content Version: 11.4  © 3398-3888 [employer]. Care instructions adapted under license by [employer] (which disclaims liability or warranty for this information). If you have questions about a medical condition or this instruction, always ask your healthcare professional. [place] [address] any warranty or liability for your use of this information.

## 2018-07-02 NOTE — DISCHARGE SUMMARY
Discharge Summary    Patient: Mary Kay Hardy MRN: [id number]  CSN: [id number]    YOB: 1955  Age: 65 y.o.   Sex: male    DOA: 6/29/2018 LOS:  LOS: 3 days   Discharge Date:      Primary Care Provider:  Georges Last MD    Admission Diagnoses: Pyelonephritis  UTI (urinary tract infection)  Fever  Constipation  Urinary retention  DM (diabetes mellitus) ([facility]  Post laminectomy syndrome    Discharge Diagnoses:    Problem List as of 7/2/2018  Date Reviewed: 6/29/2018          Codes Class Noted - Resolved    SIRS (systemic inflammatory response syndrome) ([facility] ICD-10-CM: R65.10  ICD-9-CM: 995.90  6/30/2018 - Present        * (Principal)Pyelonephritis ICD-10-CM: N12  ICD-9-CM: 590.80  6/29/2018 - Present        Urinary retention ICD-10-CM: R33.9  ICD-9-CM: 788.20  6/29/2018 - Present        UTI (urinary tract infection) ICD-10-CM: N39.0  ICD-9-CM: 599.0  6/29/2018 - Present        Post laminectomy syndrome ICD-10-CM: M96.1  ICD-9-CM: 722.80  6/29/2018 - Present        Type II or unspecified type diabetes mellitus with renal manifestations, uncontrolled(250.42) ([facility] ICD-10-CM: E11.29, E11.65  ICD-9-CM: 250.42  6/29/2018 - Present        Constipation ICD-10-CM: K59.00  ICD-9-CM: 564.00  6/29/2018 - Present        Fever ICD-10-CM: R50.9  ICD-9-CM: 780.60  6/29/2018 - Present        Constipated ICD-10-CM: K59.00  ICD-9-CM: 564.00  6/16/2018 - Present        Insulin dependent diabetes mellitus ([facility] ICD-10-CM: E11.9, Z79.4  ICD-9-CM: 250.00, V58.67  6/16/2018 - Present        Lumbar stenosis with neurogenic claudication ICD-10-CM: M48.062  ICD-9-CM: 724.03  6/13/2018 - Present        Pain at surgical incision ICD-10-CM: L76.82  ICD-9-CM: 782.0  4/19/2018 - Present        Cervical spondylosis with radiculopathy ICD-10-CM: M47.22  ICD-9-CM: 721.0  4/18/2018 - Present        Renal calculus ICD-10-CM: N20.0  ICD-9-CM: 592.0  7/15/2016 - Present    Overview Addendum 7/20/2016  8:40 AM by Jeff Bro MD KUB 7/8/16 Several left renal calculi are present in the midportion of the left kidney with the largest measuring 1.5 cm x 1.1 cm.  7/20/16  Patient is not having pain and wants to hold off on an appointment right now. Right flank pain ICD-10-CM: R10.9  ICD-9-CM: 789.09  7/15/2016 - Present    Overview Signed 7/15/2016  8:05 AM by Deyanira Perez MD     KUB 7/8/16   8mm density over the right sacral ala. No similar-sized right renal calculus is present on the prior CT scan. No corresponding bone island is present on the prior CT scan. This may represent bowel contents mimicking a small stone. If there is strong clinical suspicion of a ureteral calculus a dedicated renal stone CT is recommended. Hematuria ICD-10-CM: R31.9  ICD-9-CM: 599.70  1/7/2016 - Present    Overview Addendum 7/15/2016  8:04 AM by Deyanira Perez MD     a 62 y.o. male who presents today for  H/o stones   Referred for microscopic hematuria    FOS  Good  , Nocturia n , dysuria n , frequency nn , urgency n, hematuria no gross hematuria .  Has NIDDM  Scheduled for CT urogram   Monday    Urinalysis sentara trace , small 2010   No fhx ACP ,  Mother pancreatic CA   PSA   0.54      Cytology 7/16   Reactive,  Neg malignancy       KUB 7/8/16 Several left renal calculi are present in the midportion of the  left kidney with the largest measuring 1.5 cm x 1.1 cm. Nonobstructing 4 mm calculus is present in the upper pole of the  right kidney. There is a subtle 8mm density projecting over the right sacral ala and there is no corresponding bone island on the CT scan. This may represent bowel contents mimicking a small calculus.  No calculus of this size is present in the right kidney on the prior  CT scan 1/16                      Discharge Medications:     Current Discharge Medication List      START taking these medications    Details   Saccharomyces boulardii (FLORASTOR) 250 mg capsule Take 2 Caps by mouth two (2) times a day for 7 days. Qty: 28 Cap, Refills: 0      tamsulosin (FLOMAX) 0.4 mg capsule Take 1 Cap by mouth nightly. Qty: 30 Cap, Refills: 0      levoFLOXacin (LEVAQUIN) 500 mg tablet Take 1 Tab by mouth daily. Qty: 5 Tab, Refills: 0         CONTINUE these medications which have NOT CHANGED    Details   docusate sodium (COLACE) 100 mg capsule Take 100 mg by mouth two (2) times a day. simethicone (GAS-X) 80 mg chewable tablet Take 80 mg by mouth every six (6) hours as needed for Flatulence. polyethylene glycol (MIRALAX) 17 gram packet Take 17 g by mouth daily. pregabalin (LYRICA) 100 mg capsule Take 25 mg by mouth two (2) times a day. oxyCODONE-acetaminophen (PERCOCET) 5-325 mg per tablet Take 1 Tab by mouth every four (4) hours as needed. Max Daily Amount: 6 Tabs. Qty: 60 Tab, Refills: 0    Associated Diagnoses: Lumbar stenosis with neurogenic claudication      SITagliptin (JANUVIA) 50 mg tablet Take 50 mg by mouth daily. lisinopril (PRINIVIL, ZESTRIL) 20 mg tablet Take 20 mg by mouth nightly. metFORMIN (GLUCOPHAGE) 500 mg tablet Take 1,000 mg by mouth two (2) times daily (with meals). simvastatin (ZOCOR) 20 mg tablet Take  by mouth nightly. lubiPROStone (AMITIZA) 8 mcg capsule Take 1 Cap by mouth two (2) times daily (with meals). Indications: OPIOID-INDUCED CONSTIPATION  Qty: 10 Cap, Refills: 0      lactulose (CHRONULAC) 10 gram/15 mL solution Take 45 mL by mouth two (2) times a day. Qty: 1 Bottle, Refills: 0         STOP taking these medications       morphine CR (MS CONTIN) 60 mg CR tablet Comments:   Reason for Stopping:               Discharge Condition: Stable    Procedures : None    Consults: None      PHYSICAL EXAM    Visit Vitals    /75    Pulse 80    Temp 98.4 °F (36.9 °C)    Resp 18    Ht 6' 2\" (1.88 m)    Wt 115.3 kg (254 lb 3.2 oz)    SpO2 94%    BMI 32.64 kg/m2     General: Awake, cooperative, no acute distress    HEENT: NC, Atraumatic. PERRLA, EOMI. Anicteric sclerae. Lungs:  CTA Bilaterally. No Wheezing/Rhonchi/Rales. Heart:  Regular  rhythm,  No murmur, No Rubs, No Gallops  Abdomen: Soft, Non distended, Non tender. +Bowel sounds,   Extremities: No c/c/e  Psych:   Not anxious or agitated. Neurologic:  No acute neurological deficits. Admission HPI : Red Yañez is a 65 y.o.  male who has hx of DM, renal calculi, and renal cyst   Presents post laminectomy 2 weeks ago with Fever urinary frequency abdominal pain   And constipation ; he has been taking between 4 -6 percocet a day for chronic back pain and numbness  With hx of constipation prolonging hospital discharge   Constipation regimen includes :AMitzia, Karime lax, Doculax, lactulose with no improvement in BM  Appetite  has been poor as well but denies loss of urine or fecal incontinence. In ER found to have fever of 101, leukocytosis and UTI  Asked to admit for further evaluation. Hospital Course : This patient was admitted to medical services on June 29, 2018 for a fever and UTI postoperatively. He was admitted to the medical floor, started on Levaquin for his UTI. His urine was sent for culture and returned growing Enterobacter aerogenes, which is sensitive to Levaquin, and the patient will be discharged home today with a Rx for Levaquin to complete a total of 7 days of therapy. He will also be given a Rx for Florastor. His blood cultures were checked as he was febrile upon admission, which remain no growth to date. His DM was managed with sliding scale and a diabetic diet. He was instructed to resume his usual medications upon discharge. His HTN was managed with his usual home medications as well. This patient worked with physical therapy during his stay, and at the time of discharge their recommendations are home health. The patient previously has been set up with home health from a previous surgery, and wishes to resume with the same company. Case management has arranged this. He will be discharged home with home health in stable condition today. Activity: PT/OT per Home Health    Diet: Diabetic Diet    Follow-up: PCP; Orthopedics    Disposition: Home Health    Minutes spent on discharge: 40       Labs: Results:       Chemistry Recent Labs      07/02/18 0442 07/01/18   0508 06/29/18   1643   GLU  136*  131*  169*   NA  140  136  135*   K  3.7  3.8  4.3   CL  105  102  98*   CO2  24  25  28   BUN  10  14  18   CREA  0.84  0.86  1.19   CA  8.6  8.4*  8.8   AGAP  11  9  9   BUCR  12  16  15   AP   --    --   75   TP   --    --   7.6   ALB   --    --   3.2*   GLOB   --    --   4.4*   AGRAT   --    --   0.7*      CBC w/Diff Recent Labs      07/02/18 0442 07/01/18   0508 06/29/18   1643   WBC  4.1*  6.7  14.4*   RBC  3.79*  3.79*  4.30*   HGB  10.6*  10.7*  12.2*   HCT  32.7*  33.1*  37.5   PLT  171  150  187   GRANS  50  75*  79*   LYMPH  33  13*  12*   EOS  3  2  0      Cardiac Enzymes No results for input(s): CPK, CKND1, GRECIA in the last 72 hours. No lab exists for component: CKRMB, TROIP   Coagulation No results for input(s): PTP, INR, APTT in the last 72 hours. No lab exists for component: INREXT    Lipid Panel No results found for: CHOL, CHOLPOCT, CHOLX, CHLST, CHOLV, 201782, HDL, LDL, LDLC, DLDLP, 563153, VLDLC, VLDL, TGLX, TRIGL, TRIGP, TGLPOCT, CHHD, CHHDX   BNP No results for input(s): BNPP in the last 72 hours. Liver Enzymes Recent Labs      06/29/18   1643   TP  7.6   ALB  3.2*   AP  75   SGOT  13*      Thyroid Studies No results found for: T4, T3U, TSH, TSHEXT         Significant Diagnostic Studies: Xr Spine Lumb Sngl V    Result Date: 6/13/2018  Examination: Lumbar spine single view. HISTORY: Lumbar laminectomy. Lumbar stenosis. FINDINGS: A single spot lateral fluoroscopic projection of the lumbar spine is performed and interpreted without comparison. Numbering assumes 5 lumbar segments.  Posterior instrumented spinal fusion spanning L4-S1 with intervertebral disc spacer L4-L5. IMPRESSION: 1. In progress lumbar spinal fusion procedure. Xr Abd Acute W 1 V Chest    Result Date: 6/30/2018  Acute abdominal series COMPARISON: 6/16/2018 INDICATION: Acute abdominal pain. FINDINGS: PA view of the chest, as well as, upright and supine views of the abdomen were obtained. The cardiomediastinal silhouette is within normal limits. Tortuous and atherosclerotic thoracic aorta. No central pulmonary vascular congestion. Lung parenchyma is well aerated, without focal consolidation. No pleural effusion nor pneumothorax. No acute osseous abnormality. Partially visualized lower cervical ACDF hardware. Prior L4-S1 posterior decompression and instrumentation. Several foci of calcification overlie the region of the left renal silhouette. A few, less conspicuous calculi are also seen in the region of the right renal silhouette. Nonobstructive bowel gas pattern. No free intraperitoneal air. Impression: Nonobstructive bowel gas pattern. Left and right renal calculi. No acute pulmonary abnormality. Xr Swallow Func Video    Result Date: 6/14/2018  Modified Barium Swallow History: Feeding difficulties, Dysphagia Technique: The procedure was performed with the speech pathologist. Different consistencies of barium tinged products were given to swallow during real time fluoroscopic observation. Findings: With thin consistency via cup and straw, pt demonstrated trace penetration without aspiration[]. No significant residua in the vallecula or pyriform sinus. With putting consistency , pt demonstrated [premature spillage without penetration or aspiration. ]. There was no significant residua in the vallecula or pyriform sinus. With []solids , pt demonstrated [premature spillage without penetration or aspiration]. There was no significant residua in the vallecula or pyriform sinus . Patient able to swallow pill without significant abnormality. Fluoroscopic time: 1.0 minutes Fluoroscopic dose (reference air kerma): 5.67 mGy     Impression: Abnormal modified swallow study as described. Please see full speech pathologist report to follow for discussion of findings and detailed recommendations. Videotape is available for review. Ct Abd Pelv W Wo Cont    Result Date: 6/16/2018  EXAM: CT of the abdomen and pelvis INDICATION: Severe postop constipation and abdomen pain status post back surgery COMPARISON: None. TECHNIQUE: Axial CT imaging of the abdomen and pelvis was performed with intravenous contrast. Multiplanar reformats were generated. One or more dose reduction techniques were used on this CT: automated exposure control, adjustment of the mAs and/or kVp according to patient's size, and iterative reconstruction techniques. The specific techniques utilized on this CT exam have been documented in the patient's electronic medical record. _______________ FINDINGS: LOWER CHEST: Unremarkable. LIVER, BILIARY: Liver is normal. No biliary dilation. Gallbladder is present. PANCREAS: Normal. SPLEEN: Normal. ADRENALS: Normal. KIDNEYS: There are numerous rounded cystic masses of both kidneys. The largest is at lower pole of the left kidney and measures 4.2 x 4.0 cm. These appear to be simple cysts. There is a focus of cortical scarring at lower pole of the kidneys. . At mid pole on the left there is a grouping of low density cyst with anterior asymmetric calcification measuring about 0.9 x 1.9 cm, probably complex cyst. Several small scattered nonobstructing calculi in both kidneys. LYMPH NODES: No enlarged lymph nodes. GASTROINTESTINAL TRACT: No bowel dilation or wall thickening. No bowel obstruction. No free air or free fluid. Oral contrast has been given and has reached the rectum. Scattered diverticula without diverticulitis. Appendix not seen. No right lower quadrant inflammation. PELVIC ORGANS: Urinary bladder is distended and contains air.  Most likely this is secondary to recent catheter placement. Correlation recommended. Prostate gland measures about 4.4 x 4.2 cm. No free fluid in the pelvis. VASCULATURE: Unremarkable. BONES: No acute or aggressive osseous abnormalities identified. Evidence of posterior interbody fusion from L4 through S1. Disc spacer at L4/5. Hardware is intact. There is soft tissue swelling and some soft tissue gas at the operative levels. OTHER: None. _______________     IMPRESSION: No evidence of bowel obstruction, free air, free fluid or constipation. Diverticulosis without current signs of diverticulitis. Renal masses bilaterally. Most are consistent with benign simple cysts. One grouping contains some asymmetric calcifications, complex cystic. There are several scattered nonobstructing kidney stones. Postoperative changes from lumbar spine fusion. Xr Chest Port    Result Date: 6/30/2018  Chest, single view Indication: Fever and shortness of breath Comparison: None Findings:  Portable upright AP view of the chest was obtained. The cardiomediastinal silhouette is within normal limits. The pulmonary vasculature is unremarkable. Lung parenchyma is well aerated, without focal consolidation. No pleural effusion nor pneumothorax. No acute osseous abnormality. Partially visualized lower cervical ACDF hardware. Impression: No radiographic evidence of an acute abnormality. Xr Abd Port  1 V    Result Date: 6/16/2018  Abdomen History: Abdominal pain, follow-up stool impaction Comparison: Abdomen 6/15/2018 Findings: 2 AP portable views of the abdomen performed. Study demonstrates a nonobstructive bowel gas pattern. Previously seen moderate amount of stool and oral contrast in the right ascending colon has significantly diminished during the interval, with decreasing distention of the right colon. There is much smaller amount of residual contrast present now in the descending left colon and proximal sigmoid colon which is not distended. Irregular calcifications within the left midabdomen are present, likely renal in location. Hardware is present from prior lumbosacral fusion. Accompanying laminectomies were performed. Degenerative changes are present in the lumbar spine. IMPRESSION: 1. Nonobstructive bowel gas pattern. Considerable interval improvement in amount of stool, oral contrast, and distention of the right colon. 2. Probable left renal calculi. Xr Abd Port  1 V    Result Date: 6/15/2018  Abdomen, single view COMPARISON: None. INDICATION: Constipation. FINDINGS: Supine AP portable view the abdomen on 2 separate exposures obtained. Large burden of formed shoulder the large intestine noted. No gross free intraperitoneal gas given limitations of supine technique. Calcifications project over the left midabdomen. Lower lumbar surgical instrumentation noted. Impression: 1. Large burden of formed stool in keeping with the clinical history of constipation. 2. Possible left-sided nephroliths. Nc Xr Technologist Service    Result Date: 6/14/2018  See impression. Impression:  Fluoroscopy was provided for this procedure under the supervision and/or direction of the attending provider. ? For further information regarding this procedure, see patient medical record. No results found for this or any previous visit.         CC: Annalise Luis MD

## 2018-07-02 NOTE — ROUTINE PROCESS
Bedside and Verbal shift change report given to Julian Kaplan RN   (oncoming nurse) by Ct Fuller (offgoing nurse). Report included the following information SBAR, Kardex and MAR  0900 Pt AOx4, complain of minimal pain on the back, refused pain meds. 1000 Pt resting quietly with family member. 1030Pt BM loose. 1200 Pt good appetite, good mood, denies any pain, family member present  1 Pt D/C with wife, going home. Dual AVS reviewed with ANURAG ZEPEDA. All medications reviewed individually with patient. Opportunities for questions and concerns provided. Patient discharged via (mode of transport ie. Car, ambulance or air transport) Car  Patient's arm band appropriately discarded. Pt D/C with wife to go home.

## 2018-07-03 ENCOUNTER — HOME CARE VISIT (OUTPATIENT)
Dept: SCHEDULING | Facility: HOME HEALTH | Age: 63
End: 2018-07-03
Payer: COMMERCIAL

## 2018-07-03 VITALS
SYSTOLIC BLOOD PRESSURE: 120 MMHG | RESPIRATION RATE: 17 BRPM | DIASTOLIC BLOOD PRESSURE: 80 MMHG | HEART RATE: 75 BPM | TEMPERATURE: 96.5 F

## 2018-07-03 VITALS
HEART RATE: 76 BPM | RESPIRATION RATE: 14 BRPM | DIASTOLIC BLOOD PRESSURE: 76 MMHG | TEMPERATURE: 96.1 F | SYSTOLIC BLOOD PRESSURE: 125 MMHG | OXYGEN SATURATION: 96 %

## 2018-07-03 PROCEDURE — G0299 HHS/HOSPICE OF RN EA 15 MIN: HCPCS

## 2018-07-03 PROCEDURE — G0151 HHCP-SERV OF PT,EA 15 MIN: HCPCS

## 2018-07-04 ENCOUNTER — HOME CARE VISIT (OUTPATIENT)
Dept: HOME HEALTH SERVICES | Facility: HOME HEALTH | Age: 63
End: 2018-07-04
Payer: COMMERCIAL

## 2018-07-05 LAB
BACTERIA SPEC CULT: NORMAL
BACTERIA SPEC CULT: NORMAL
SERVICE CMNT-IMP: NORMAL
SERVICE CMNT-IMP: NORMAL

## 2018-07-06 ENCOUNTER — HOME CARE VISIT (OUTPATIENT)
Dept: SCHEDULING | Facility: HOME HEALTH | Age: 63
End: 2018-07-06
Payer: COMMERCIAL

## 2018-07-06 ENCOUNTER — HOME CARE VISIT (OUTPATIENT)
Dept: HOME HEALTH SERVICES | Facility: HOME HEALTH | Age: 63
End: 2018-07-06
Payer: COMMERCIAL

## 2018-07-06 PROCEDURE — G0157 HHC PT ASSISTANT EA 15: HCPCS

## 2018-07-06 PROCEDURE — G0299 HHS/HOSPICE OF RN EA 15 MIN: HCPCS

## 2018-07-07 LAB
ATRIAL RATE: 100 BPM
CALCULATED P AXIS, ECG09: 18 DEGREES
CALCULATED R AXIS, ECG10: 4 DEGREES
CALCULATED T AXIS, ECG11: 3 DEGREES
DIAGNOSIS, 93000: NORMAL
P-R INTERVAL, ECG05: 162 MS
Q-T INTERVAL, ECG07: 336 MS
QRS DURATION, ECG06: 82 MS
QTC CALCULATION (BEZET), ECG08: 433 MS
VENTRICULAR RATE, ECG03: 100 BPM

## 2018-07-08 VITALS
SYSTOLIC BLOOD PRESSURE: 134 MMHG | TEMPERATURE: 98.6 F | HEART RATE: 90 BPM | OXYGEN SATURATION: 98 % | DIASTOLIC BLOOD PRESSURE: 86 MMHG

## 2018-07-09 NOTE — ADT AUTH CERT NOTES
Utilization Review           Pyelonephritis, Acute - Care Day 3 (7/1/2018) by Leigh Dougherty RN        Review Status Review Entered       Completed 7/2/2018       Details              Care Day: 3 Care Date: 7/1/2018 Level of Care: Inpatient Floor       Guideline Day 3        Clinical Status       (X) * Hemodynamic stability       7/2/2018 12:37 PM EDT by Joanie Mckinley         T 100.3, HR 90, RR 18, /69, SAT 95% ON RA              (X) * Mental status at baseline       7/2/2018 12:37 PM EDT by Joanie Mckinley         BASELINE              ( ) * Antibiotic regimen for next level of care established       ( ) * Urine output adequate       (X) * Renal function at baseline or improved       7/2/2018 12:37 PM EDT by Joanie Mckinley         BASELINE              (X) * Pain absent or managed       7/2/2018 12:37 PM EDT by Joanie Mckinley         MANAGED              ( ) * Oral intake adequate       ( ) * Fever absent or reduced       ( ) * Discharge plans and education understood              Activity       (X) * Ambulatory       7/2/2018 12:37 PM EDT by Joanie Mckinley         AMBULATORY                     Routes       ( ) * Oral hydration, medications, [F] and diet              Interventions       (X) Renal function tests, urinalysis       7/2/2018 12:37 PM EDT by Joanie Mckinley         CREAT 0.86, BUN 14, CA 8.4, RBC 3.79, H/H 10.7/33.1, NEUTROPHILS 75                     7/2/2018 12:37 PM EDT by Joanie Mckinley       Subject: Additional Clinical Information       ASSESSMENT/PLAN:SIRS/fever/pyelonephritis/UTI: On sepsis protocol. IV levaquin, Urine cx revealed with Enterobacter. Sensitive to Levaquin. Blood Cx stay neg. IVF    Chronic Constipation: On miralax and colace, lactulose. Had BM this am, reviewed the abdominal X-ray, on florastor and gas X, be caution with narcotics [place] retention:  On flomaxMEDS: NS 75ML/H, BENADRYL 12.5MG IV X 1, HEPARIN 5000U SC Q8H, LACTULOSE 30G PO BID, LEVAQUIN 500MG IV Q24H                                 * Milestone                  Pyelonephritis, Acute - Care Day 2 (6/30/2018) by Dalila Schmitt, RN        Review Status Review Entered       Completed 7/2/2018       Details              Care Day: 2 Care Date: 6/30/2018 Level of Care: Inpatient Floor       Guideline Day 2        Clinical Status       (X) * Hemodynamic stability       7/2/2018 12:30 PM EDT by Cathy Hdez         /75, HR 98, RR 16, T 98.6, SAT 94% ON RA              (X) * Fever absent or reduced       7/2/2018 12:30 PM EDT by Cathy Hdez         ABSENT              (X) * Renal function at baseline or stable       7/2/2018 12:30 PM EDT by Cathy Hdez         BASELINE                     Activity       (X) * Ambulatory       7/2/2018 12:30 PM EDT by Cathy Hdez         AMBULATORY                     Routes       (X) IV fluids, medications       7/2/2018 12:30 PM EDT by Cathy Hdez         NS 75ML/H, BENADRY 12.5MG, LEVAQUIN 500MG IV Q24H, PERCOCET 2TABS 5-325MG PO X 1                                          * Milestone

## 2018-07-10 ENCOUNTER — HOME CARE VISIT (OUTPATIENT)
Dept: SCHEDULING | Facility: HOME HEALTH | Age: 63
End: 2018-07-10
Payer: COMMERCIAL

## 2018-07-10 PROCEDURE — G0157 HHC PT ASSISTANT EA 15: HCPCS

## 2018-07-12 ENCOUNTER — HOME CARE VISIT (OUTPATIENT)
Dept: SCHEDULING | Facility: HOME HEALTH | Age: 63
End: 2018-07-12
Payer: COMMERCIAL

## 2018-07-12 ENCOUNTER — HOME CARE VISIT (OUTPATIENT)
Dept: HOME HEALTH SERVICES | Facility: HOME HEALTH | Age: 63
End: 2018-07-12
Payer: COMMERCIAL

## 2018-07-12 PROCEDURE — G0157 HHC PT ASSISTANT EA 15: HCPCS

## 2019-01-29 NOTE — CONSULTS
Medicine Consult    Patient:  Dina Adhikari 65 y.o. male  Asked to evaluate patient by dr Haydee Khan Provider:  Grey Hardwick MD  Date of Admission:  6/13/2018  Reason for Consult: dm        Assessment/Plan     Patient Active Problem List   Diagnosis Code    Hematuria R31.9    Renal calculus N20.0    Right flank pain R10.9    Cervical spondylosis with radiculopathy M47.22    Pain at surgical incision L76.82    Lumbar stenosis with neurogenic claudication M48.062     dm2  htn  Dyslipidemia    64 y/o for dm mgmt. 1.  Stop orals. 2.  Follow blood sugar. 3.  Humalog sliding scale. 4.  F/u with pcp regarding elevated hgb a1c.  5.  Continue bp and lipid lowering therapy. 6.  DVT px per admitting team.         Thank you for allowing us to participate in this patients care. HPI:   CC:misael Adhikari is a 65 y.o. male with past medical history significant for dm and spinal issues that have been addressed surgically today. We have been asked to follow his dm2. Takes metformin and [place] and [place].       Past Medical History:   Diagnosis Date    Adverse effect of anesthesia 2018    hoarseness  after recent ACDF surgery, some difficulty swallowing    Arthritis     Cancer ([address]     Basal cell skin cancers    Chronic low back pain     Diabetic neuropathy (HCC)     DM (diabetes mellitus) ([address]     GERD (gastroesophageal reflux disease)     Hematuria     HTN (hypertension)     Hyperlipidemia     Pyogenic granuloma of skin and subcutaneous tissue     Thyroid disease 1979    Varicella      Past Surgical History:   Procedure Laterality Date    HX APPENDECTOMY      HX BACK SURGERY  2018    cervical    HX ENDOSCOPY      HX HEENT      partial thyroidectomy    HX UROLOGICAL  2010's    Cystoscopy, ureteroscopy with stent placement    NEUROLOGICAL PROCEDURE UNLISTED      Epidural steroid injections      Social History   Substance Use Topics    Smoking status: Never Smoker    Smokeless tobacco: Never Used    Alcohol use No     Family History   Problem Relation Age of Onset    Cancer Father     Cancer Mother     Diabetes Mother      No current facility-administered medications on file prior to encounter. Current Outpatient Prescriptions on File Prior to Encounter   Medication Sig Dispense Refill    lisinopril (PRINIVIL, ZESTRIL) 20 mg tablet Take 20 mg by mouth nightly.  traMADol (ULTRAM) 50 mg tablet Take 50 mg by mouth every six (6) hours as needed for Pain.  metFORMIN (GLUCOPHAGE) 500 mg tablet Take 1,000 mg by mouth two (2) times daily (with meals).  simvastatin (ZOCOR) 20 mg tablet Take  by mouth nightly.  oxyCODONE-acetaminophen (PERCOCET) 5-325 mg per tablet Take 1 Tab by mouth every four (4) hours as needed. Max Daily Amount: 6 Tabs. 30 Tab 0      No Known Allergies        Review of Systems  Constitutional: No fever, chills, diaphoresis, malaise, fatigue or weight gain/loss or falls  Skin: no itching or rashes  HEENT: no ear discomfort, hearing loss, tinnitus, epistaxis or sore throat  EYES: no blurry vision, double vision or photophobia  CARDIOVASCULAR: no claudication, cp, palpitations, orthopnea, pnd or LE edema  PULMONARY: no cough, wheeze, shortness of breath or sputum production  GI: no nausea, vomiting, diarrhea, abdominal pain, melena, hematemesis or brbpr  : no dysuria, hematuria  MUSCULOSKELETAL: no back pain, joint pain or myalgias  ENDOCRINE: no heat/cold intolerance, polyuria or polydipsia  HEME: no easy bruising or bleeding  NEURO: no unilateral weakness, numbness, tingling or seizures      Physical Exam:      Visit Vitals    /70    Pulse 94    Temp 97.6 °F (36.4 °C)    Resp 16    Ht 6' (1.829 m)    Wt 120.7 kg (266 lb 3 oz)    SpO2 93%    BMI 36.1 kg/m2     Body mass index is 36.1 kg/(m^2).     Physical Exam:  GEN: well nourished, laying in bed in no acute distress  HEENT: atraumatic, nose normal,oropharynx clear, MMM  NECK: supple, trachea midline, no supraclavicular or submandibular adenopathy noted  EYES: conjuctiva normal, lids with out lesions, PERRL  HEART: RRR with out m/r/g, pmi nondisplaced, pulses 2+ distally  LUNGS: equal chest wall expansion, cta bl with out wheezes/rales or rhonchi  AB: soft, +BS, nt/nd no organomegaly  NEURO: alert, awake and oriented x3, gait not assessed, cranial nerves intact, strength 5/5 bl UE and LE, sensation intact, reflexes nonpathological  SKIN: dry, intact, warm no breakdown noted        Laboratory Studies:    Recent Results (from the past 24 hour(s))   GLUCOSE, POC    Collection Time: 06/13/18  6:08 AM   Result Value Ref Range    Glucose (POC) 194 (H) 70 - 110 mg/dL   GLUCOSE, POC    Collection Time: 06/13/18  1:19 PM   Result Value Ref Range    Glucose (POC) 301 (H) 70 - 110 mg/dL   GLUCOSE, POC    Collection Time: 06/13/18  4:50 PM   Result Value Ref Range    Glucose (POC) 264 (H) 70 - 110 mg/dL Detail Level: Zone

## 2019-12-07 NOTE — PROGRESS NOTES
DC Plan: Discharge home with family assistance, PCP referral once medically stable    Chart reviewed as CM on call. Pt admitted to medical for cellulitis. Pt noted to have been at [facility] recently and admitted at [facility] back in September. Noted hx IVDU. CM met with pt at bedside, no friend/family present. Pt states she lives with grandparents, home address confirmed per face sheet. Pt states she will have ride home at discharge, but doesn't elaborate further rolling eyes when probed. Pt states she previously saw an MD Krishna Marroquin for PCP, but hasn't seen him in [place]", appears he is a pediatrician. Will ask CMS to do PCP referral, pt agrees with new PCP referral.  Pt independent, denies using DME. Only concern at this time is pain control, primary RN aware. CM will cont to follow for any discharge planning needs and will be available via hospital  on weekends. Reason for Admission:   Per H&P, pt is \"18 y/o F with h/o IVDU. Admitted for right thumb infection with associated cellulitis.      Medical floor  IV Rocephin and Vancomycin   Dr. Asmita Rubin consulting for hand orthopedic surgery -right thumb felon with ulceration and soft tissue compromise, possible early flexor tenosynovitis, performed I&D at bedside, will wait and see if improvement overnight, if not he will repeat incision and debridement in the operating room tomorrow. Follow wound cultures  NPO after midnight  Keep digit elevation  Pain control\"                   RRAT Score:          8 low           Plan for utilizing home health:      None at this time, pt independent and not home bound. Provider if [facility] needed please place order                    Current Advanced Directive/Advance Care Plan: Not on file                         Transition of Care Plan:         MD referral/follow up             Care Management Interventions  PCP Verified by CM: Yes(ANGELITO Verde)  Mode of Transport at Discharge:  Other (see comment)(FRIEND OR Doing very well post op. P.T. Today. Likely home tomorrow. FAMILY)  Transition of Care Consult (CM Consult): Discharge Planning  Current Support Network: Relative's Home(LIVES WITH GRANDPARENTS)  Confirm Follow Up Transport: Family  Plan discussed with Pt/Family/Caregiver: Yes  Discharge Location  Discharge Placement: Home with family assistance

## 2020-05-06 NOTE — H&P
Uma Julian  Location: - [facility]  Patient #: [id number]  : 1955   / Language: English / Race: White  Male      History of Present Illness   The patient is a 62year old male who presents with neck pain. This condition occurred without any known injury. Symptoms include neck pain, neck stiffness and impaired range of motion. Symptoms are located in the entire neck, left posterior neck and left lateral neck. The pain radiates to the left shoulder, left hand and right hand. The patient describes the pain as sharp, aching and stinging. The patient describes symptoms as moderate in severity. Past treatment has included spinal surgery. Note for \"Neck pain\": He has a history of prior spinal surgery at C6-7.  Recent imaging shows residual stenosis post dural leg at C6-7 with cord impingement.  Spondylosis at C5-6.  He has worsening hand symptoms and signs of myelopathy again. Additional reasons for visit:    Chronic lumbar back pain is described as the following:  Note for \"Chronic lumbar back pain\": Chronic history of lower back pain. Prior fusion L4-S1. Now with stenosis at L3-4 with instability. His back pain is almost as bad as his neck pain. He denies any bowel bladder difficulties. Most of his symptoms are neurogenic claudication at this time. Allergies   No Known Allergies      Family History   Breast cancer   Sister. Cancer   Father, Mother. Social History   Alcohol use   1-2 drinks per occasion, 3 times, Drinks hard liquor, Never drinks more than 5 drinks per occasion, per year. Caffeine use   1-2 drinks per day, Carbonated beverages, Coffee, Tea. Current work status   Disabled. Exercise   3-4 times per week, walking. Marital status   . No drug use    Seat Belt Use   Always uses seat belts. Sun Exposure   Frequently. Tobacco / smoke exposure   None. Tobacco use   Never smoker. Medication History  metFORMIN HCl  (500MG Tablet, Oral) Active.   Lisinopril  (20MG Tablet, Oral) Active. Pioglitazone HCl  (45MG Tablet, Oral) Active. Gabapentin  (300MG Capsule, Oral) Active. Medications Reconciled     Past Surgical History   Appendectomy    Joint Fusion    Neck Disc Surgery    Spinal Surgery    Thyroidectomy; Subtotal      Diagnostic Studies History   Lumbar Spine X-ray   Date: 3/5/2020, Results: Prior instrumented fusion L4-S1. Grade 1 anterolisthesis of 5 mm with flexion. Reduces with extension. Cervical Spine X-ray   Date: 2/17/2020, Results:, Date: 3/5/2020, Results:Prior ACDF at C6-7. Kyphosis predominantly at the C5-6 disc space. Encroachment of the disc space of C5-6 to the superior portion of the C6-7 plate. MRI, Cervical Spine   Date: 2/3/2020, Results: Prior ACDF at C6-7. Residual disc herniation or disc material causing residual anterior compression of the cervical cord. No cord changes at this time. MRI, Spine   Lumbar, Date: 2/18/2019, Results: Lumbar, Date: 9/28/2018, Results: Most recent lumbar MRI demonstrates a prior L4-S1 fusion. Facet widening at L3-4. Synovial cyst at L3-4 on the left causing spinal stenosis as well. Other Problems  Diabetes Mellitus    High blood pressure    Skin Cancer    Unspecified Diagnosis      Review of Systems   General Present- Fatigue. Not Present- Appetite Loss, Chills, Fever, HIV Exposure, Night Sweats, Persistent Infections, Seasonal Allergies, Weight Gain and Weight Loss. Skin Present- Itching. Not Present- Nail Changes, Poor Wound Healing, Rash, Skin Color Changes, Suspicious Lesions and Yellowish Skin Color. HEENT Present- Decreased Hearing and Ringing in the Ears. Not Present- Earache, Hoarseness, Loose Teeth, Nose Bleed and Sore Throat. Cardiovascular Not Present- Bluish Discoloration Of Lips Or Nails, Chest Pain, Difficulty Breathing Lying Down, Difficulty Breathing On Exertion, Leg Cramps With Exertion, Palpitations and Swelling of Extremities.   Gastrointestinal Not Present- Abdominal Pain, Black, Tarry Stool, Change in Bowel Habits, Cirrhosis, Constipation, Diarrhea, Difficulty Swallowing, Nausea and Vomiting. Male Genitourinary Not Present- Blood in Urine, Frequency, Painful Urination, Pelvic Pain, Trouble Starting Urinary Stream and Urgency. Musculoskeletal Present- Back Pain, Joint Pain and Joint Stiffness. Not Present- Joint Swelling. Neurological Present- Numbness, Tingling, Unsteadiness and Weakness. Not Present- Fainting, Headaches, Memory Loss, Seizures and Tremor. Endocrine Not Present- Cold Intolerance, Excessive Hunger, Excessive Thirst, Excessive Urination and Heat Intolerance. Hematology Not Present- Abnormal Bruising , Enlarged Lymph Nodes, Excessive bleeding and Skin Discoloration. Vitals  Weight: 265 lb   Height: 74 in   Weight was reported by patient. Height was reported by patient. Body Surface Area: 2.45 m²   Body Mass Index: 34.02 kg/m²      Physical Exam  Neurologic  Sensory  Light Touch - Intact - Globally. Overall Assessment of Muscle Strength and Tone reveals  Upper Extremities - Right Deltoid - 5/5. Left Deltoid - 5/5. Right Bicep - 5/5. Left Bicep - 5/5. Right Tricep - 5/5. Left Tricep - 4-/5. Right Wrist Extensors - 5/5. Left Wrist Extensors - 4-/5. Right Wrist Flexors - 5/5. Left Wrist Flexors - 5/5. Right Intrinsics - 5/5. Left Intrinsics - 5/5. Lower Extremities - Right Iliopsoas - 5/5. Left Iliopsoas - 5/5. Right Tibialis Anterior - 3/5. Left Tibialis Anterior - 3+/5. Right Gastroc-Soleus - 5/5. Left Gastroc-Soleus - 5/5. Right EHL - 5/5. Left EHL - 5/5. General Assessment of Reflexes  Right Hand - Madden's sign is positive in the right hand. Left Hand - Madden's sign is positive in the left hand. Right Ankle - Clonus is not present. Left Ankle - Clonus is not present.   Reflexes (Dermatomes)  2/2 Normal - Left Achilles (L5-S2), Left Bicep (C5-6), Left Knee (L2-4), Left Tricep (C7-8), Left Brachioradialis (C5-6), Right Achilles (L5-S2), Right Bicep (C5-6), Right Knee (L2-4), Right Tricep (C7-8) and Right Brachioradialis (C5-6). Musculoskeletal  Global Assessment  Examination of related systems reveals - well-developed, well-nourished, in no acute distress, alert and oriented x 3 and normal coordination. Gait and Station - normal gait and station and normal posture. Right Lower Extremity - normal range of motion without pain and no instability, subluxation or laxity. Left Lower Extremity - normal range of motion without pain and no instability, subluxation or laxity. Spine/Ribs/Pelvis  Cervical Spine - Evaluation of related systems reveals - no lymphadenopathy and neurovascularly intact bilaterally. Examination of the cervical spine reveals - no swelling, edema or erythema and normal sensation. Inspection and Palpation - Tenderness - moderate and localized. Assessment of pain reveals the following findings: - Location - cervical area. ROJM - Flexion - 85 °. Right Lateral Flexion - 35 °. Left Lateral Flexion - 35 °. Extension - 70 °. Right Rotation - 80 °. Left Rotation - 80 °. Cervical Spine - Functional Testing - Foraminal Compression/Spurling's Test negative, Shoulder Depression Test negative, Upper Limb Tension Test negative. Thoracic (Dorsal) Spine - Examination of the thoracic spine reveals - no tenderness over thoracic vertebrae, no pain, normal sensation and normal thoracic spine movements. Lumbosacral Spine - Examination of the lumbosacral spine reveals - no tenderness to palpation, no pain, normal strength and tone, no laxity or crepitus, normal lumbosacral spine movements and no known fractures or deformities. Functional Testing - Babinski Test negative, Prone Knee Bending Test negative, Slump Test negative, Straight Leg Raising Test negative.       Assessment & Plan   S/P lumbar fusion (V45.4  Z98.1)  Current Plans  X-RAY EXAM OF LUMBAR SPINE, 4 OR MORE VIEWS (79503) (AP, Lateral, Flexion and Extension views were taken today using Digital Radiography.)  Pt Education - How to Lobo Keller  using Patient Portal and 3rd Party Apps: discussed with patient and provided information. Pt Education - Educational materials were provided.: discussed with patient and provided information. S/P cervical spinal fusion (V45.4  Z98.1)  Impression: He has a complex history of both neck and lower back issues. Although his back is hurting him more we had a lengthy discussion today about the residual cord compression that he has a C6-7 despite prior surgery. He also has spondylosis and wear at C5-6. After further discussion with him and his wife we decided to proceed with a revision anterior cervical discectomy and fusion from C5-C7 for cord stabilization and adequate decompression. Once recovered from that we can consider treatment for his junctional stenosis at L3-4 above his prior fusion. In the interim we can try some injections as well. The risks and benefits were discussed at length with the patient and the patient has elected to proceed. Indications for surgery include failed conservative treatment. Alternative treatments, risks and the perioperative course were discussed with the patient. All questions were answered. The risks and benefits of the procedure were explained. Benefits include definitive diagnosis, relief of pain, elimination of deformity and improved function. Risks of surgery including bleeding, infection, weakness, numbness, CSF leak, failure to improve symptoms, exacerbation of medical co-morbidities and even death were discussed with the patient.   Current Plans  X-RAY EXAM OF CERVICAL SPINE MIN 4 VIEWS (08807) (AP, Lateral and Flexion and Extension views were taken today using Digital Radiography.)  Chronic neck pain (723.1  M54.2)  Cervical disc herniation (722.0  M50.20)  Cervical spinal stenosis (723.0  M48.02)  Spondylolisthesis (Acquired) (738.4  M43.10)  Treatment options were discussed with the patient in full.(X08.49)  Current Plans  Presurgical planning was preformed with the patient today  Surgery to be scheduled  Procedure: Revision ACDF C5-7        Signed by Sai Moreland MD

## 2020-05-08 ENCOUNTER — HOSPITAL ENCOUNTER (OUTPATIENT)
Dept: LAB | Age: 65
Discharge: HOME | End: 2020-05-08
Attending: ORTHOPAEDIC SURGERY
Payer: COMMERCIAL

## 2020-05-08 PROCEDURE — 87635 SARS-COV-2 COVID-19 AMP PRB: CPT

## 2020-05-08 RX ORDER — PIOGLITAZONEHYDROCHLORIDE 45 MG/1
45 TABLET ORAL
COMMUNITY

## 2020-05-08 RX ORDER — CHOLECALCIFEROL (VITAMIN D3) 125 MCG
1 CAPSULE ORAL 2 TIMES DAILY
COMMUNITY

## 2020-05-08 RX ORDER — GABAPENTIN 800 MG/1
800 TABLET ORAL 2 TIMES DAILY
COMMUNITY
End: 2022-05-10 | Stop reason: ALTCHOICE

## 2020-05-08 RX ORDER — MULTIVIT WITH MINERALS/HERBS
1 TABLET ORAL DAILY
COMMUNITY

## 2020-05-08 RX ORDER — TRAMADOL HYDROCHLORIDE 50 MG/1
50 TABLET ORAL
COMMUNITY
End: 2020-05-12

## 2020-05-10 LAB — SARS-COV-2, COV2NT: NOT DETECTED

## 2020-05-11 ENCOUNTER — ANESTHESIA EVENT (OUTPATIENT)
Dept: SURGERY | Age: 65
End: 2020-05-11
Payer: COMMERCIAL

## 2020-05-11 ENCOUNTER — HOSPITAL ENCOUNTER (OUTPATIENT)
Age: 65
Discharge: HOME | End: 2020-05-12
Attending: ORTHOPAEDIC SURGERY | Admitting: ORTHOPAEDIC SURGERY
Payer: COMMERCIAL

## 2020-05-11 ENCOUNTER — APPOINTMENT (OUTPATIENT)
Dept: GENERAL RADIOLOGY | Age: 65
End: 2020-05-11
Attending: ORTHOPAEDIC SURGERY
Payer: COMMERCIAL

## 2020-05-11 ENCOUNTER — ANESTHESIA (OUTPATIENT)
Dept: SURGERY | Age: 65
End: 2020-05-11
Payer: COMMERCIAL

## 2020-05-11 DIAGNOSIS — M47.22 CERVICAL SPONDYLOSIS WITH RADICULOPATHY: Primary | ICD-10-CM

## 2020-05-11 PROBLEM — M48.02 CERVICAL STENOSIS OF SPINE: Status: ACTIVE | Noted: 2020-05-11

## 2020-05-11 LAB
ABO + RH BLD: NORMAL
BLOOD GROUP ANTIBODIES SERPL: NORMAL
GLUCOSE BLD STRIP.AUTO-MCNC: 120 MG/DL (ref 65–100)
GLUCOSE BLD STRIP.AUTO-MCNC: 128 MG/DL (ref 65–100)
GLUCOSE BLD STRIP.AUTO-MCNC: 167 MG/DL (ref 65–100)
SERVICE CMNT-IMP: ABNORMAL
SPECIMEN EXP DATE BLD: NORMAL

## 2020-05-11 PROCEDURE — 82962 GLUCOSE BLOOD TEST: CPT

## 2020-05-11 PROCEDURE — 99218 HC RM OBSERVATION: CPT

## 2020-05-11 PROCEDURE — 74011000250 HC RX REV CODE- 250: Performed by: ANESTHESIOLOGY

## 2020-05-11 PROCEDURE — C1713 ANCHOR/SCREW BN/BN,TIS/BN: HCPCS | Performed by: ORTHOPAEDIC SURGERY

## 2020-05-11 PROCEDURE — 77030040506 HC DRN WND MDII -A: Performed by: ORTHOPAEDIC SURGERY

## 2020-05-11 PROCEDURE — 77030004391 HC BUR FLUT MEDT -C: Performed by: ORTHOPAEDIC SURGERY

## 2020-05-11 PROCEDURE — 74011250636 HC RX REV CODE- 250/636: Performed by: NURSE ANESTHETIST, CERTIFIED REGISTERED

## 2020-05-11 PROCEDURE — 77030026438 HC STYL ET INTUB CARD -A: Performed by: ANESTHESIOLOGY

## 2020-05-11 PROCEDURE — 77030018836 HC SOL IRR NACL ICUM -A: Performed by: ORTHOPAEDIC SURGERY

## 2020-05-11 PROCEDURE — 36415 COLL VENOUS BLD VENIPUNCTURE: CPT

## 2020-05-11 PROCEDURE — 77030040424 HC CGE CERV SPN ANATOMIC PTC MEDT -G: Performed by: ORTHOPAEDIC SURGERY

## 2020-05-11 PROCEDURE — 74011250637 HC RX REV CODE- 250/637: Performed by: PHYSICIAN ASSISTANT

## 2020-05-11 PROCEDURE — 77030002933 HC SUT MCRYL J&J -A: Performed by: ORTHOPAEDIC SURGERY

## 2020-05-11 PROCEDURE — 74011250636 HC RX REV CODE- 250/636: Performed by: PHYSICIAN ASSISTANT

## 2020-05-11 PROCEDURE — 74011250637 HC RX REV CODE- 250/637: Performed by: ANESTHESIOLOGY

## 2020-05-11 PROCEDURE — 77030003666 HC NDL SPINAL BD -A: Performed by: ORTHOPAEDIC SURGERY

## 2020-05-11 PROCEDURE — 77030038600 HC TU BPLR IRR DISP STRY -B: Performed by: ORTHOPAEDIC SURGERY

## 2020-05-11 PROCEDURE — 74011000250 HC RX REV CODE- 250: Performed by: NURSE ANESTHETIST, CERTIFIED REGISTERED

## 2020-05-11 PROCEDURE — 77030003832 HC BIT DRL ATLNTS MEDT -B: Performed by: ORTHOPAEDIC SURGERY

## 2020-05-11 PROCEDURE — 74011000250 HC RX REV CODE- 250: Performed by: ORTHOPAEDIC SURGERY

## 2020-05-11 PROCEDURE — 74011000272 HC RX REV CODE- 272: Performed by: ORTHOPAEDIC SURGERY

## 2020-05-11 PROCEDURE — 77030031139 HC SUT VCRL2 J&J -A: Performed by: ORTHOPAEDIC SURGERY

## 2020-05-11 PROCEDURE — 77030011267 HC ELECTRD BLD COVD -A: Performed by: ORTHOPAEDIC SURGERY

## 2020-05-11 PROCEDURE — 76060000036 HC ANESTHESIA 2.5 TO 3 HR: Performed by: ORTHOPAEDIC SURGERY

## 2020-05-11 PROCEDURE — 77030029099 HC BN WAX SSPC -A: Performed by: ORTHOPAEDIC SURGERY

## 2020-05-11 PROCEDURE — 76010000172 HC OR TIME 2.5 TO 3 HR INTENSV-TIER 1: Performed by: ORTHOPAEDIC SURGERY

## 2020-05-11 PROCEDURE — 77030040361 HC SLV COMPR DVT MDII -B: Performed by: ORTHOPAEDIC SURGERY

## 2020-05-11 PROCEDURE — 77030040922 HC BLNKT HYPOTHRM STRY -A

## 2020-05-11 PROCEDURE — 76210000017 HC OR PH I REC 1.5 TO 2 HR: Performed by: ORTHOPAEDIC SURGERY

## 2020-05-11 PROCEDURE — C1889 IMPLANT/INSERT DEVICE, NOC: HCPCS | Performed by: ORTHOPAEDIC SURGERY

## 2020-05-11 PROCEDURE — 77030032834 HC GRFT BN SUB MUSC -F: Performed by: ORTHOPAEDIC SURGERY

## 2020-05-11 PROCEDURE — 77030014650 HC SEAL MTRX FLOSEL BAXT -C: Performed by: ORTHOPAEDIC SURGERY

## 2020-05-11 PROCEDURE — 74011000258 HC RX REV CODE- 258: Performed by: PHYSICIAN ASSISTANT

## 2020-05-11 PROCEDURE — 74011250636 HC RX REV CODE- 250/636: Performed by: ANESTHESIOLOGY

## 2020-05-11 PROCEDURE — 77030012890

## 2020-05-11 PROCEDURE — 77010033678 HC OXYGEN DAILY

## 2020-05-11 PROCEDURE — 86900 BLOOD TYPING SEROLOGIC ABO: CPT

## 2020-05-11 PROCEDURE — 77030008684 HC TU ET CUF COVD -B: Performed by: ANESTHESIOLOGY

## 2020-05-11 DEVICE — SCREW 7713517 ZEVO VAR SD 3.5MM X 17MM
Type: IMPLANTABLE DEVICE | Site: SPINE CERVICAL | Status: FUNCTIONAL
Brand: ZEVO™ ANTERIOR CERVICAL PLATE SYSTEM

## 2020-05-11 DEVICE — CAGE 5030764 ANATOMIC PTC 16X14X7MM
Type: IMPLANTABLE DEVICE | Site: SPINE CERVICAL | Status: FUNCTIONAL
Brand: ANATOMIC PEEK PTC CERVICAL FUSION SYSTEM

## 2020-05-11 DEVICE — GRAFT BNE SUB SM CANC FRZN MORSELIZED W/ VIABLE CELL: Type: IMPLANTABLE DEVICE | Site: SPINE CERVICAL | Status: FUNCTIONAL

## 2020-05-11 DEVICE — Z DUP USE 2602123 GRAFT HUM TISS 3CMX 4CM AMNIO MEM VERSASHIELD: Type: IMPLANTABLE DEVICE | Site: SPINE CERVICAL | Status: FUNCTIONAL

## 2020-05-11 RX ORDER — SUCCINYLCHOLINE CHLORIDE 20 MG/ML
INJECTION INTRAMUSCULAR; INTRAVENOUS AS NEEDED
Status: DISCONTINUED | OUTPATIENT
Start: 2020-05-11 | End: 2020-05-11 | Stop reason: HOSPADM

## 2020-05-11 RX ORDER — GABAPENTIN 400 MG/1
800 CAPSULE ORAL 2 TIMES DAILY
Status: DISCONTINUED | OUTPATIENT
Start: 2020-05-11 | End: 2020-05-12 | Stop reason: HOSPADM

## 2020-05-11 RX ORDER — DEXMEDETOMIDINE HYDROCHLORIDE 100 UG/ML
INJECTION, SOLUTION INTRAVENOUS AS NEEDED
Status: DISCONTINUED | OUTPATIENT
Start: 2020-05-11 | End: 2020-05-11 | Stop reason: HOSPADM

## 2020-05-11 RX ORDER — PROPOFOL 10 MG/ML
INJECTION, EMULSION INTRAVENOUS AS NEEDED
Status: DISCONTINUED | OUTPATIENT
Start: 2020-05-11 | End: 2020-05-11 | Stop reason: HOSPADM

## 2020-05-11 RX ORDER — MAGNESIUM SULFATE 100 %
4 CRYSTALS MISCELLANEOUS AS NEEDED
Status: DISCONTINUED | OUTPATIENT
Start: 2020-05-11 | End: 2020-05-12 | Stop reason: HOSPADM

## 2020-05-11 RX ORDER — MIDAZOLAM HYDROCHLORIDE 1 MG/ML
INJECTION, SOLUTION INTRAMUSCULAR; INTRAVENOUS AS NEEDED
Status: DISCONTINUED | OUTPATIENT
Start: 2020-05-11 | End: 2020-05-11 | Stop reason: HOSPADM

## 2020-05-11 RX ORDER — SODIUM CHLORIDE, SODIUM LACTATE, POTASSIUM CHLORIDE, CALCIUM CHLORIDE 600; 310; 30; 20 MG/100ML; MG/100ML; MG/100ML; MG/100ML
INJECTION, SOLUTION INTRAVENOUS
Status: DISCONTINUED | OUTPATIENT
Start: 2020-05-11 | End: 2020-05-11 | Stop reason: HOSPADM

## 2020-05-11 RX ORDER — HYDROMORPHONE HYDROCHLORIDE 2 MG/ML
INJECTION, SOLUTION INTRAMUSCULAR; INTRAVENOUS; SUBCUTANEOUS AS NEEDED
Status: DISCONTINUED | OUTPATIENT
Start: 2020-05-11 | End: 2020-05-11 | Stop reason: HOSPADM

## 2020-05-11 RX ORDER — LIDOCAINE HYDROCHLORIDE 10 MG/ML
0.1 INJECTION, SOLUTION EPIDURAL; INFILTRATION; INTRACAUDAL; PERINEURAL AS NEEDED
Status: DISCONTINUED | OUTPATIENT
Start: 2020-05-11 | End: 2020-05-11 | Stop reason: HOSPADM

## 2020-05-11 RX ORDER — MORPHINE SULFATE 10 MG/ML
2 INJECTION, SOLUTION INTRAMUSCULAR; INTRAVENOUS
Status: DISCONTINUED | OUTPATIENT
Start: 2020-05-11 | End: 2020-05-11 | Stop reason: HOSPADM

## 2020-05-11 RX ORDER — DEXAMETHASONE SODIUM PHOSPHATE 4 MG/ML
INJECTION, SOLUTION INTRA-ARTICULAR; INTRALESIONAL; INTRAMUSCULAR; INTRAVENOUS; SOFT TISSUE AS NEEDED
Status: DISCONTINUED | OUTPATIENT
Start: 2020-05-11 | End: 2020-05-11 | Stop reason: HOSPADM

## 2020-05-11 RX ORDER — EPHEDRINE SULFATE/0.9% NACL/PF 50 MG/5 ML
5 SYRINGE (ML) INTRAVENOUS AS NEEDED
Status: DISCONTINUED | OUTPATIENT
Start: 2020-05-11 | End: 2020-05-11 | Stop reason: HOSPADM

## 2020-05-11 RX ORDER — HYDROMORPHONE HYDROCHLORIDE 1 MG/ML
INJECTION, SOLUTION INTRAMUSCULAR; INTRAVENOUS; SUBCUTANEOUS
Status: DISPENSED
Start: 2020-05-11 | End: 2020-05-12

## 2020-05-11 RX ORDER — SODIUM CHLORIDE 0.9 % (FLUSH) 0.9 %
5-40 SYRINGE (ML) INJECTION AS NEEDED
Status: DISCONTINUED | OUTPATIENT
Start: 2020-05-11 | End: 2020-05-12 | Stop reason: HOSPADM

## 2020-05-11 RX ORDER — CYCLOBENZAPRINE HCL 10 MG
10 TABLET ORAL
Status: DISCONTINUED | OUTPATIENT
Start: 2020-05-11 | End: 2020-05-12 | Stop reason: HOSPADM

## 2020-05-11 RX ORDER — MIDAZOLAM HYDROCHLORIDE 1 MG/ML
1 INJECTION, SOLUTION INTRAMUSCULAR; INTRAVENOUS AS NEEDED
Status: DISCONTINUED | OUTPATIENT
Start: 2020-05-11 | End: 2020-05-11 | Stop reason: HOSPADM

## 2020-05-11 RX ORDER — SODIUM CHLORIDE 0.9 % (FLUSH) 0.9 %
5-40 SYRINGE (ML) INJECTION EVERY 8 HOURS
Status: DISCONTINUED | OUTPATIENT
Start: 2020-05-11 | End: 2020-05-12 | Stop reason: HOSPADM

## 2020-05-11 RX ORDER — DIPHENHYDRAMINE HYDROCHLORIDE 50 MG/ML
12.5 INJECTION, SOLUTION INTRAMUSCULAR; INTRAVENOUS AS NEEDED
Status: DISCONTINUED | OUTPATIENT
Start: 2020-05-11 | End: 2020-05-11 | Stop reason: HOSPADM

## 2020-05-11 RX ORDER — SODIUM CHLORIDE 9 MG/ML
125 INJECTION, SOLUTION INTRAVENOUS CONTINUOUS
Status: DISCONTINUED | OUTPATIENT
Start: 2020-05-11 | End: 2020-05-12 | Stop reason: HOSPADM

## 2020-05-11 RX ORDER — PROPOFOL 10 MG/ML
VIAL (ML) INTRAVENOUS
Status: DISCONTINUED | OUTPATIENT
Start: 2020-05-11 | End: 2020-05-11 | Stop reason: HOSPADM

## 2020-05-11 RX ORDER — SODIUM CHLORIDE, SODIUM LACTATE, POTASSIUM CHLORIDE, CALCIUM CHLORIDE 600; 310; 30; 20 MG/100ML; MG/100ML; MG/100ML; MG/100ML
100 INJECTION, SOLUTION INTRAVENOUS CONTINUOUS
Status: DISCONTINUED | OUTPATIENT
Start: 2020-05-11 | End: 2020-05-11 | Stop reason: HOSPADM

## 2020-05-11 RX ORDER — MELATONIN
2000 2 TIMES DAILY
Status: DISCONTINUED | OUTPATIENT
Start: 2020-05-11 | End: 2020-05-12 | Stop reason: HOSPADM

## 2020-05-11 RX ORDER — ACETAMINOPHEN 500 MG
1000 TABLET ORAL EVERY 6 HOURS
Status: DISCONTINUED | OUTPATIENT
Start: 2020-05-11 | End: 2020-05-12 | Stop reason: HOSPADM

## 2020-05-11 RX ORDER — DIPHENHYDRAMINE HYDROCHLORIDE 50 MG/ML
12.5 INJECTION, SOLUTION INTRAMUSCULAR; INTRAVENOUS
Status: DISCONTINUED | OUTPATIENT
Start: 2020-05-11 | End: 2020-05-12 | Stop reason: HOSPADM

## 2020-05-11 RX ORDER — SODIUM CHLORIDE 9 MG/ML
25 INJECTION, SOLUTION INTRAVENOUS CONTINUOUS
Status: DISCONTINUED | OUTPATIENT
Start: 2020-05-11 | End: 2020-05-11 | Stop reason: HOSPADM

## 2020-05-11 RX ORDER — KETOROLAC TROMETHAMINE 30 MG/ML
15 INJECTION, SOLUTION INTRAMUSCULAR; INTRAVENOUS EVERY 6 HOURS
Status: DISCONTINUED | OUTPATIENT
Start: 2020-05-11 | End: 2020-05-12 | Stop reason: HOSPADM

## 2020-05-11 RX ORDER — LIDOCAINE HYDROCHLORIDE 20 MG/ML
INJECTION, SOLUTION EPIDURAL; INFILTRATION; INTRACAUDAL; PERINEURAL AS NEEDED
Status: DISCONTINUED | OUTPATIENT
Start: 2020-05-11 | End: 2020-05-11 | Stop reason: HOSPADM

## 2020-05-11 RX ORDER — KETAMINE HYDROCHLORIDE 10 MG/ML
INJECTION, SOLUTION INTRAMUSCULAR; INTRAVENOUS AS NEEDED
Status: DISCONTINUED | OUTPATIENT
Start: 2020-05-11 | End: 2020-05-11 | Stop reason: HOSPADM

## 2020-05-11 RX ORDER — LISINOPRIL 20 MG/1
20 TABLET ORAL
Status: DISCONTINUED | OUTPATIENT
Start: 2020-05-11 | End: 2020-05-12 | Stop reason: HOSPADM

## 2020-05-11 RX ORDER — PHENYLEPHRINE HCL IN 0.9% NACL 0.4MG/10ML
SYRINGE (ML) INTRAVENOUS AS NEEDED
Status: DISCONTINUED | OUTPATIENT
Start: 2020-05-11 | End: 2020-05-11 | Stop reason: HOSPADM

## 2020-05-11 RX ORDER — ONDANSETRON 2 MG/ML
4 INJECTION INTRAMUSCULAR; INTRAVENOUS AS NEEDED
Status: DISCONTINUED | OUTPATIENT
Start: 2020-05-11 | End: 2020-05-11 | Stop reason: HOSPADM

## 2020-05-11 RX ORDER — INSULIN LISPRO 100 [IU]/ML
INJECTION, SOLUTION INTRAVENOUS; SUBCUTANEOUS
Status: DISCONTINUED | OUTPATIENT
Start: 2020-05-11 | End: 2020-05-12 | Stop reason: HOSPADM

## 2020-05-11 RX ORDER — OXYCODONE HYDROCHLORIDE 5 MG/1
5 TABLET ORAL
Status: DISCONTINUED | OUTPATIENT
Start: 2020-05-11 | End: 2020-05-12 | Stop reason: HOSPADM

## 2020-05-11 RX ORDER — NALOXONE HYDROCHLORIDE 0.4 MG/ML
0.4 INJECTION, SOLUTION INTRAMUSCULAR; INTRAVENOUS; SUBCUTANEOUS AS NEEDED
Status: DISCONTINUED | OUTPATIENT
Start: 2020-05-11 | End: 2020-05-12 | Stop reason: HOSPADM

## 2020-05-11 RX ORDER — FENTANYL CITRATE 50 UG/ML
25 INJECTION, SOLUTION INTRAMUSCULAR; INTRAVENOUS
Status: DISCONTINUED | OUTPATIENT
Start: 2020-05-11 | End: 2020-05-11 | Stop reason: HOSPADM

## 2020-05-11 RX ORDER — OXYCODONE HYDROCHLORIDE 5 MG/1
5 TABLET ORAL AS NEEDED
Status: DISCONTINUED | OUTPATIENT
Start: 2020-05-11 | End: 2020-05-11 | Stop reason: HOSPADM

## 2020-05-11 RX ORDER — DEXAMETHASONE SODIUM PHOSPHATE 10 MG/ML
10 INJECTION INTRAMUSCULAR; INTRAVENOUS EVERY 6 HOURS
Status: DISCONTINUED | OUTPATIENT
Start: 2020-05-11 | End: 2020-05-12 | Stop reason: HOSPADM

## 2020-05-11 RX ORDER — SODIUM CHLORIDE, SODIUM LACTATE, POTASSIUM CHLORIDE, CALCIUM CHLORIDE 600; 310; 30; 20 MG/100ML; MG/100ML; MG/100ML; MG/100ML
1000 INJECTION, SOLUTION INTRAVENOUS CONTINUOUS
Status: DISCONTINUED | OUTPATIENT
Start: 2020-05-11 | End: 2020-05-11 | Stop reason: HOSPADM

## 2020-05-11 RX ORDER — METFORMIN HYDROCHLORIDE 500 MG/1
1000 TABLET ORAL 2 TIMES DAILY WITH MEALS
Status: DISCONTINUED | OUTPATIENT
Start: 2020-05-11 | End: 2020-05-12 | Stop reason: HOSPADM

## 2020-05-11 RX ORDER — AMOXICILLIN 250 MG
1 CAPSULE ORAL 2 TIMES DAILY
Status: DISCONTINUED | OUTPATIENT
Start: 2020-05-11 | End: 2020-05-12 | Stop reason: HOSPADM

## 2020-05-11 RX ORDER — ROPIVACAINE HYDROCHLORIDE 5 MG/ML
150 INJECTION, SOLUTION EPIDURAL; INFILTRATION; PERINEURAL AS NEEDED
Status: DISCONTINUED | OUTPATIENT
Start: 2020-05-11 | End: 2020-05-11 | Stop reason: HOSPADM

## 2020-05-11 RX ORDER — FENTANYL CITRATE 50 UG/ML
INJECTION, SOLUTION INTRAMUSCULAR; INTRAVENOUS AS NEEDED
Status: DISCONTINUED | OUTPATIENT
Start: 2020-05-11 | End: 2020-05-11 | Stop reason: HOSPADM

## 2020-05-11 RX ORDER — ROCURONIUM BROMIDE 10 MG/ML
INJECTION, SOLUTION INTRAVENOUS AS NEEDED
Status: DISCONTINUED | OUTPATIENT
Start: 2020-05-11 | End: 2020-05-11 | Stop reason: HOSPADM

## 2020-05-11 RX ORDER — ONDANSETRON 2 MG/ML
4 INJECTION INTRAMUSCULAR; INTRAVENOUS
Status: DISCONTINUED | OUTPATIENT
Start: 2020-05-11 | End: 2020-05-12 | Stop reason: HOSPADM

## 2020-05-11 RX ORDER — MORPHINE SULFATE 2 MG/ML
2 INJECTION, SOLUTION INTRAMUSCULAR; INTRAVENOUS
Status: DISCONTINUED | OUTPATIENT
Start: 2020-05-11 | End: 2020-05-12 | Stop reason: HOSPADM

## 2020-05-11 RX ORDER — FENTANYL CITRATE 50 UG/ML
50 INJECTION, SOLUTION INTRAMUSCULAR; INTRAVENOUS AS NEEDED
Status: DISCONTINUED | OUTPATIENT
Start: 2020-05-11 | End: 2020-05-11 | Stop reason: HOSPADM

## 2020-05-11 RX ORDER — DEXTROSE MONOHYDRATE 100 MG/ML
0-250 INJECTION, SOLUTION INTRAVENOUS AS NEEDED
Status: DISCONTINUED | OUTPATIENT
Start: 2020-05-11 | End: 2020-05-12 | Stop reason: HOSPADM

## 2020-05-11 RX ORDER — ACETAMINOPHEN 325 MG/1
650 TABLET ORAL ONCE
Status: COMPLETED | OUTPATIENT
Start: 2020-05-11 | End: 2020-05-11

## 2020-05-11 RX ORDER — POLYETHYLENE GLYCOL 3350 17 G/17G
17 POWDER, FOR SOLUTION ORAL DAILY
Status: DISCONTINUED | OUTPATIENT
Start: 2020-05-12 | End: 2020-05-12 | Stop reason: HOSPADM

## 2020-05-11 RX ORDER — HYDROMORPHONE HYDROCHLORIDE 1 MG/ML
0.2 INJECTION, SOLUTION INTRAMUSCULAR; INTRAVENOUS; SUBCUTANEOUS
Status: DISCONTINUED | OUTPATIENT
Start: 2020-05-11 | End: 2020-05-11

## 2020-05-11 RX ORDER — OXYCODONE HYDROCHLORIDE 5 MG/1
10 TABLET ORAL
Status: DISCONTINUED | OUTPATIENT
Start: 2020-05-11 | End: 2020-05-12 | Stop reason: HOSPADM

## 2020-05-11 RX ORDER — MIDAZOLAM HYDROCHLORIDE 1 MG/ML
0.5 INJECTION, SOLUTION INTRAMUSCULAR; INTRAVENOUS
Status: DISCONTINUED | OUTPATIENT
Start: 2020-05-11 | End: 2020-05-11 | Stop reason: HOSPADM

## 2020-05-11 RX ORDER — FACIAL-BODY WIPES
10 EACH TOPICAL DAILY PRN
Status: DISCONTINUED | OUTPATIENT
Start: 2020-05-13 | End: 2020-05-12 | Stop reason: HOSPADM

## 2020-05-11 RX ORDER — ONDANSETRON 2 MG/ML
INJECTION INTRAMUSCULAR; INTRAVENOUS AS NEEDED
Status: DISCONTINUED | OUTPATIENT
Start: 2020-05-11 | End: 2020-05-11 | Stop reason: HOSPADM

## 2020-05-11 RX ADMIN — KETAMINE HYDROCHLORIDE 20 MG: 10 INJECTION, SOLUTION INTRAMUSCULAR; INTRAVENOUS at 12:09

## 2020-05-11 RX ADMIN — LIDOCAINE HYDROCHLORIDE 100 MG: 20 INJECTION, SOLUTION EPIDURAL; INFILTRATION; INTRACAUDAL; PERINEURAL at 11:52

## 2020-05-11 RX ADMIN — DEXMEDETOMIDINE HYDROCHLORIDE 5 MCG: 100 INJECTION, SOLUTION, CONCENTRATE INTRAVENOUS at 12:31

## 2020-05-11 RX ADMIN — Medication 10 ML: at 17:18

## 2020-05-11 RX ADMIN — SENNOSIDES AND DOCUSATE SODIUM 1 TABLET: 8.6; 5 TABLET ORAL at 17:18

## 2020-05-11 RX ADMIN — MIDAZOLAM 3 MG: 1 INJECTION INTRAMUSCULAR; INTRAVENOUS at 11:44

## 2020-05-11 RX ADMIN — PROPOFOL 200 MG: 10 INJECTION, EMULSION INTRAVENOUS at 11:52

## 2020-05-11 RX ADMIN — SODIUM CHLORIDE, SODIUM LACTATE, POTASSIUM CHLORIDE, AND CALCIUM CHLORIDE 1000 ML: 600; 310; 30; 20 INJECTION, SOLUTION INTRAVENOUS at 11:12

## 2020-05-11 RX ADMIN — LISINOPRIL 20 MG: 20 TABLET ORAL at 21:39

## 2020-05-11 RX ADMIN — SODIUM CHLORIDE, POTASSIUM CHLORIDE, SODIUM LACTATE AND CALCIUM CHLORIDE: 600; 310; 30; 20 INJECTION, SOLUTION INTRAVENOUS at 11:33

## 2020-05-11 RX ADMIN — OXYCODONE 5 MG: 5 TABLET ORAL at 23:49

## 2020-05-11 RX ADMIN — DEXAMETHASONE SODIUM PHOSPHATE 10 MG: 10 INJECTION, SOLUTION INTRAMUSCULAR; INTRAVENOUS at 23:29

## 2020-05-11 RX ADMIN — SODIUM CHLORIDE 125 ML/HR: 900 INJECTION, SOLUTION INTRAVENOUS at 17:18

## 2020-05-11 RX ADMIN — GABAPENTIN 800 MG: 400 CAPSULE ORAL at 17:18

## 2020-05-11 RX ADMIN — LIDOCAINE HYDROCHLORIDE 0.1 ML: 10 INJECTION, SOLUTION EPIDURAL; INFILTRATION; INTRACAUDAL; PERINEURAL at 11:11

## 2020-05-11 RX ADMIN — PIOGLITAZONE 45 MG: 15 TABLET ORAL at 21:39

## 2020-05-11 RX ADMIN — METFORMIN HYDROCHLORIDE 1000 MG: 500 TABLET ORAL at 17:19

## 2020-05-11 RX ADMIN — ONDANSETRON HYDROCHLORIDE 4 MG: 2 INJECTION, SOLUTION INTRAMUSCULAR; INTRAVENOUS at 12:01

## 2020-05-11 RX ADMIN — Medication 80 MCG: at 13:52

## 2020-05-11 RX ADMIN — HYDROMORPHONE HYDROCHLORIDE 0.5 MG: 2 INJECTION, SOLUTION INTRAMUSCULAR; INTRAVENOUS; SUBCUTANEOUS at 14:26

## 2020-05-11 RX ADMIN — KETOROLAC TROMETHAMINE 15 MG: 30 INJECTION, SOLUTION INTRAMUSCULAR at 17:19

## 2020-05-11 RX ADMIN — SODIUM CHLORIDE 125 ML/HR: 900 INJECTION, SOLUTION INTRAVENOUS at 23:30

## 2020-05-11 RX ADMIN — CEFAZOLIN 3 G: 1 INJECTION, POWDER, FOR SOLUTION INTRAMUSCULAR; INTRAVENOUS; PARENTERAL at 12:19

## 2020-05-11 RX ADMIN — DEXMEDETOMIDINE HYDROCHLORIDE 5 MCG: 100 INJECTION, SOLUTION, CONCENTRATE INTRAVENOUS at 12:34

## 2020-05-11 RX ADMIN — ACETAMINOPHEN 650 MG: 325 TABLET, FILM COATED ORAL at 11:20

## 2020-05-11 RX ADMIN — FENTANYL CITRATE 100 MCG: 50 INJECTION, SOLUTION INTRAMUSCULAR; INTRAVENOUS at 12:00

## 2020-05-11 RX ADMIN — DEXAMETHASONE SODIUM PHOSPHATE 2 MG: 4 INJECTION, SOLUTION INTRAMUSCULAR; INTRAVENOUS at 12:01

## 2020-05-11 RX ADMIN — ACETAMINOPHEN 1000 MG: 500 TABLET, FILM COATED ORAL at 23:29

## 2020-05-11 RX ADMIN — HYDROMORPHONE HYDROCHLORIDE 0.5 MG: 2 INJECTION, SOLUTION INTRAMUSCULAR; INTRAVENOUS; SUBCUTANEOUS at 12:16

## 2020-05-11 RX ADMIN — KETAMINE HYDROCHLORIDE 30 MG: 10 INJECTION, SOLUTION INTRAMUSCULAR; INTRAVENOUS at 11:52

## 2020-05-11 RX ADMIN — MELATONIN 2 TABLET: at 17:19

## 2020-05-11 RX ADMIN — FENTANYL CITRATE 50 MCG: 50 INJECTION, SOLUTION INTRAMUSCULAR; INTRAVENOUS at 11:52

## 2020-05-11 RX ADMIN — KETOROLAC TROMETHAMINE 15 MG: 30 INJECTION, SOLUTION INTRAMUSCULAR at 23:29

## 2020-05-11 RX ADMIN — Medication 10 ML: at 21:40

## 2020-05-11 RX ADMIN — ROCURONIUM BROMIDE 5 MG: 10 SOLUTION INTRAVENOUS at 11:52

## 2020-05-11 RX ADMIN — PROPOFOL 100 MCG/KG/MIN: 10 INJECTION, EMULSION INTRAVENOUS at 11:57

## 2020-05-11 RX ADMIN — ACETAMINOPHEN 1000 MG: 500 TABLET, FILM COATED ORAL at 17:18

## 2020-05-11 RX ADMIN — DEXAMETHASONE SODIUM PHOSPHATE 10 MG: 10 INJECTION, SOLUTION INTRAMUSCULAR; INTRAVENOUS at 17:19

## 2020-05-11 RX ADMIN — CEFAZOLIN SODIUM 3 G: 10 INJECTION, POWDER, FOR SOLUTION INTRAVENOUS at 19:38

## 2020-05-11 RX ADMIN — Medication 80 MCG: at 13:17

## 2020-05-11 RX ADMIN — SUCCINYLCHOLINE CHLORIDE 200 MG: 20 INJECTION, SOLUTION INTRAMUSCULAR; INTRAVENOUS at 11:52

## 2020-05-11 NOTE — BRIEF OP NOTE
Brief Postoperative Note    Patient: Danny Flaherty  YOB: 1955  MRN: [id number]    Date of Procedure: 5/11/2020     Pre-Op Diagnosis: STENOSIS    Post-Op Diagnosis: Same as preoperative diagnosis. Procedure(s):  C5-7 ANTERIOR CERVICAL DISCECTOMY WITH FUSION (ESSENTIAL)    Surgeon(s):  Mindy Ashley MD    Surgical Assistant: Physician Assistant: SHIRA Thomas    Anesthesia: General     Estimated Blood Loss (mL): less than 50     Complications: None    Specimens: * No specimens in log *     Implants:   Implant Name Type Inv. Item Serial No.  Lot No. LRB No. Used Action   GRAFT BNE ELITE JOSE SM --  - [id number]  GRAFT BNE EL[employer] JOSE SM --  [id number] MUSCULOSKELETAL TRANS N/A N/A 1 Implanted   GRAFT BNE EL[employer] JOSE SM --  - [id number]  GRAFT BNE EL[employer] JOSE SM --  [id number] MUSCULOSKELETAL TRANS N/A N/A 1 Implanted   MEMBRANE AMNIOTIC WND 3X4CM -- VERSASHIELD - [id number]  MEMBRANE AMNIOTIC WND 3X4CM -- VERSASHIELD [id number] [employer]TH[employer] INC N/A N/A 1 Implanted   CAGE CERV [id number] STRL -- ANATOMIC PTC - SN/A  CAGE CERV [id number] STRL -- ANATOMIC PTC N/A MEDTRONIC SOFAMOR DANEK 55JR N/A 1 Implanted   CAGE CERV [id number] STRL -- ANATOMIC PTC - SN/A  CAGE CERV [id number] STRL -- ANATOMIC PTC N/A MEDTR[employer]IC SOFAMOR DANEK 55JR N/A 1 Implanted   PLATE ANTR CERV 81QR 2 LVL -- ZEVO - SN/A  PLATE ANTR CERV [id number] 2 LVL -- ZEVO N/A [employer]IC SOFAMOR DANEK N/A N/A 1 Implanted   SCR ANTR CERV ADY SD 3.5X17MM -- ZEVO - SN/A  SCR ANTR CERV ADY SD 3.5X17MM -- ZEVO N/A [employer] SOFAMOR DANEK N/A N/A 1 Implanted       Drains:   Krishna-So Drain 05/11/20 Neck (Active)   Site Assessment Clean, dry, & intact 5/11/2020  2:06 PM   Dressing Status Clean, dry, & intact 5/11/2020  2:06 PM   Status Patent; Charged;Draining 5/11/2020  2:06 PM   Drainage Color Serosanguinous 5/11/2020  2:06 PM       [REMOVED] Hemovac Mid Back (Removed) Findings: stenosis    Electronically Signed by York Siemens, PA on 5/11/2020 at 2:12 PM

## 2020-05-11 NOTE — ROUTINE PROCESS
Patient: Olamide Chiang MRN: [id number]  SSN: [id number]   YOB: 1955  Age: 62 y.o. Sex: male     Patient is status post Procedure(s):  C5-7 ANTERIOR CERVICAL DISCECTOMY WITH FUSION (ESSENTIAL). Surgeon(s) and Role: Lisa Almendarez MD - Primary    Local/Dose/Irrigation:  Bacitracin irrigation,thrombin 5,000 units                  Peripheral IV 05/11/20 Left Hand (Active)   Site Assessment Clean, dry, & intact 5/11/2020 11:11 AM   Dressing Status Clean, dry, & intact 5/11/2020 11:11 AM   Dressing Type Transparent 5/11/2020 11:11 AM   Hub Color/Line Status Infusing 5/11/2020 11:11 AM          Krishna-So Drain 05/11/20 Neck (Active)   Site Assessment Clean, dry, & intact 5/11/2020  2:06 PM   Dressing Status Clean, dry, & intact 5/11/2020  2:06 PM   Status Patent; Charged;Draining 5/11/2020  2:06 PM   Drainage Color Serosanguinous 5/11/2020  2:06 PM      Airway - Endotracheal Tube 05/11/20 Oral (Active)                   Dressing/Packing:  Wound Neck-Dressing Type: Other (Comment); Topical skin adhesive/glue(dermabond, zipties) (05/11/20 1403)    Splint/Cast:  ]    Other:  Cervical collar

## 2020-05-11 NOTE — DISCHARGE INSTRUCTIONS
After Hospital Care Plan:  Discharge Instructions Cervical (Neck) Spine Surgery Dr. Denisha Roa    Patient Name: Seble Lafleur    Date of procedure: 5/11/2020  Date of discharge: 5/11/2020    Procedure: Procedure(s):  C5-7 ANTERIOR CERVICAL DISCECTOMY WITH FUSION (ESSENTIAL)  PCP: Evaristo Jaeger MD    Follow up appointments   -follow up with Dr. Denisha Roa in 2 weeks. Call [phone] to make an appointment as soon as you get home from the hospital.    When to call your Orthopaedic Surgeon:  -Difficulty swallowing that is worse than when you left the hospital.  -Signs of infection-if your incision is red; continues to have drainage; drainage has a foul odor or if you have a persistent fever over 101 degrees for 24 hours  -nausea or vomiting, severe headache  -changes in sensation in your arms or legs (numbness, tingling, loss of color)  -increased weakness-greater than before your surgery  -severe pain or pain not relieved by medications  -Signs of a blood clot in your leg-calf pain, tenderness, redness, swelling of lower leg    When to call your Primary Care Physician:  -Concerns about medical conditions such as diabetes, high blood pressure, asthma, congestive heart failure  -Call if blood sugars are elevated, persistent headache or dizziness, coughing or congestion, constipation or diarrhea, burning with urination, abnormal heart rate    When to call 911 and go to the nearest emergency room:  -acute onset of chest pain, shortness of breath, difficulty breathing    Activity  - You are going home a well person, be as active as possible. Your only exercise should be walking. Start with short frequent walks and increase your walking distance each day.  -Limit the amount of time you sit to 20-30 minute intervals. Sitting for prolonged periods of time will be uncomfortable for you following surgery.   - Do NOT lift anything over 5 pounds  -From now on, even when lifting light weight, bend with your knees and not your back.  -Do NOT do any neck exercises until you have been instructed by your doctor  -When you are in bed, you may lay on your back or on either side. Do NOT lie on your stomach    Cervical Collar (Aspen Collar)  -You are required to wear your cervical collar at all times; except when showering. You may remove the collar long enough to change the pads when needed and to change your dressing each day. -Do not bend or twist when your collar is off. It is best to have someone assist you when changing the pads and your dressing to prevent you from bending your neck. - Clean the pads on your neck collar every day by hand washing with a mild soap and water. Pat them dry with a towel and lay out to air dry. Do not use heat to dry the pads. Driving  -You may not drive or return to work until instructed by your physician. However, you may ride in the car for short periods of time. Incision Care  Your incision has been closed with absorbable sutures and the Zipline skin closure system. This will assist with healing. The Joen Shoulder is to remain on your incision for 2 weeks. A dry dressing (ABD and tape) will be placed over it and should be changed daily, for at least the first several days after your surgery. If you have no incisional drainage, you may leave the incision open to air if you wish, still leaving the Zipline in place. Please make sure to wash your hands prior to touching your dressing. You may take brief showers but do not run the water directly onto the wound. After your shower, blot your incision dry with a soft towel and replace the dry dressing. Do not allow the tape to come in contact with the Zipline. Do not rub or apply any lotions or ointments to your incision site. Do not soak or scrub your wound. The Zipline dressing will be removed during your two week follow-up appointment.  If you experience drainage leaking from underneath the Zipline or if it peels off before 2 weeks, please contact your orthopedic surgeons office. Showering  -You may shower in approximately 2 days after your surgery.    -Leave the dressing on during your shower. Do NOT allow the water to run directly onto your dressing. Once you get out of the shower, put on a dry dressing.  -Reminder- Make sure you put clean pads on your collar after your shower.    -Do not take a tub bath. Preventing blood clots  -You have been given T.E.D. stockings to wear. Continue to wear these for 7 days after your discharge. Put them on in the morning and take them off at night.    -They are used to increase your circulation and prevent blood clots from forming in your legs  -T. E.D. stockings can be machine washed, temperature not to exceed 160° F (71°C) and machine dried for 15 to 20 minutes, temperature not to exceed 250° F (121°C). Pain management  -Take pain medication as prescribed; decrease the amount you use as your pain lessens  -Do not wait until you are in extreme pain to take your medication.  -Avoid alcoholic beverages while taking pain medication    Pain Medication Safety  DO:  -Read the Medication Guide   -Take your medicine exactly as prescribed   -Store your medicine away from children and in a safe place   -Flush unused medicine down the toilet   -Call your healthcare provider for medical advice about side effects. You may report side effects to FDA at 2-265-FDA-0661.   -Please be aware that many medications contain Tylenol. We do not want you to over medicate so please read the information below as a guide. Do not take more than 4 Grams of Tylenol in a 24 hour period.   (There are 1000 milligrams in one Gram)                                                                                                                                                                                                    Percocet contains 325 mg of Tylenol per tablet (do not take more than 12 tablets in 24 hours)  Lortab contains 500 mg of Tylenol per tablet (do not take more than 8 tablets in 24 hours)  Norco contains 325 mg of Tylenol per tablet (do not take more than 12 tablets in 24 hours). DO NOT:  -Do not give your medicine to others   -Do not take medicine unless it was prescribed for you   -Do not stop taking your medicine without talking to your healthcare provider   -Do not break, chew, crush, dissolve, or inject your medicine. If you cannot  swallow your medicine whole, talk to your healthcare provider.  -Do not drink alcohol while taking this medicine  -Do not take anti-inflammatory medications or aspirin unless instructed by your     Physician. Diet  -resume usual diet; drink plenty of fluids; eat foods high in fiber  -It is important to have regular bowel movements. Pain medications may cause constipation. You may want to take a stool softener (such as Senokot-S or Colace) to prevent constipation. If constipation occurs, take a laxative (such as Dulcolax tablets, Milk of Magnesia, or a suppository). Laxatives should only be used if the above preventable measures have failed and you still have not had a bowel movement after three days.

## 2020-05-11 NOTE — ANESTHESIA PREPROCEDURE EVALUATION
Relevant Problems   No relevant active problems       Anesthetic History   No history of anesthetic complications            Review of Systems / Medical History  Patient summary reviewed, nursing notes reviewed and pertinent labs reviewed    Pulmonary  Within defined limits                 Neuro/Psych   Within defined limits           Cardiovascular    Hypertension                   GI/Hepatic/Renal     GERD           Endo/Other    Diabetes  Hypothyroidism  Obesity, arthritis and cancer     Other Findings              Physical Exam    Airway  Mallampati: II  TM Distance: > 6 cm  Neck ROM: normal range of motion   Mouth opening: Normal     Cardiovascular  Regular rate and rhythm,  S1 and S2 normal,  no murmur, click, rub, or gallop             Dental  No notable dental hx       Pulmonary  Breath sounds clear to auscultation               Abdominal  GI exam deferred       Other Findings            Anesthetic Plan    ASA: 3  Anesthesia type: general          Induction: Intravenous  Anesthetic plan and risks discussed with: Patient

## 2020-05-11 NOTE — ANESTHESIA POSTPROCEDURE EVALUATION
Post-Anesthesia Evaluation and Assessment    Patient: Bashir Rocha MRN: [id number]  SSN: [id number]    YOB: 1955  Age: 62 y.o. Sex: male      I have evaluated the patient and they are stable and ready for discharge from the PACU. Cardiovascular Function/Vital Signs  Visit Vitals  /70   Pulse 71   Temp 36.9 °C (98.4 °F)   Resp 12   Ht 6' 2\" (1.88 m)   Wt 122.5 kg (270 lb 1 oz)   SpO2 95%   BMI 34.67 kg/m²       Patient is status post General anesthesia for Procedure(s):  C5-7 ANTERIOR CERVICAL DISCECTOMY WITH FUSION (ESSENTIAL). Nausea/Vomiting: None    Postoperative hydration reviewed and adequate. Pain:  Pain Scale 1: Numeric (0 - 10) (05/11/20 1054)  Pain Intensity 1: 1 (05/11/20 1054)   Managed    Neurological Status:   Neuro (WDL): Within Defined Limits (05/11/20 1435)  Neuro  LUE Motor Response: Purposeful (05/11/20 1435)  LLE Motor Response: Purposeful (05/11/20 1435)  RUE Motor Response: Purposeful (05/11/20 1435)  RLE Motor Response: Purposeful (05/11/20 1435)   At baseline    Mental Status, Level of Consciousness: Alert and  oriented to person, place, and time    Pulmonary Status:   O2 Device: Nasal cannula (05/11/20 1435)   Adequate oxygenation and airway patent    Complications related to anesthesia: None    Post-anesthesia assessment completed.  No concerns    Signed By: Yvette Lopez MD     May 11, 2020

## 2020-05-11 NOTE — PERIOP NOTES
TRANSFER - OUT REPORT:    Verbal report given to Laly(name) on Citlalli Mode  being transferred to 544(unit) for routine post - op       Report consisted of patients Situation, Background, Assessment and   Recommendations(SBAR). Time Pre op antibiotic given:1219  Anesthesia Stop time: 6748  Enciso Present on Transfer to floor:no  Order for Enciso on Chart:no  Discharge Prescriptions with Chart:no    Information from the following report(s) SBAR, Kardex, OR Summary, Procedure Summary, Intake/Output, MAR, Recent Results and Med Rec Status was reviewed with the receiving nurse. Opportunity for questions and clarification was provided. Is the patient on 02? YES       L/Min 2       Other     Is the patient on a monitor? NO    Is the nurse transporting with the patient? NO    Surgical Waiting Area notified of patient's transfer from PACU? YES      The following personal items collected during your admission accompanied patient upon transfer:   Dental Appliance: Dental Appliances: ([address], IMPLANT LEFT UPPER)  Vision: Visual Aid: None  Hearing Aid:    Jewelry:    Clothing: Clothing: (clothing bag to pacu; valuables to security; cevical collar to OR w/ pt)  Other Valuables:    Valuables sent to safe:        Clothes to room.

## 2020-05-11 NOTE — PROGRESS NOTES
TRANSFER - IN REPORT:    Verbal report received from Rita (name) on Andrew Parcel  being received from PACU (unit) for routine post - op      Report consisted of patients Situation, Background, Assessment and   Recommendations(SBAR). Information from the following report(s) SBAR, Kardex, OR Summary, Procedure Summary, Intake/Output and MAR was reviewed with the receiving nurse. Opportunity for questions and clarification was provided. Assessment completed upon patients arrival to unit and care assumed. Bedside shift change report given to [employer] (oncoming nurse) by Tricia Goodrich (offgoing nurse). Report included the following information SBAR, Kardex, OR Summary, Procedure Summary, Intake/Output and MAR.

## 2020-05-11 NOTE — PERIOP NOTES
SURGIFOAM LARGE L GIVEN TO STERILE FIELD TO BE USED WITH THROMBIN DURING PROCEDURE PRN FOR HEMOSTASIS.     [address] [id number]  EXP 02-

## 2020-05-11 NOTE — PROGRESS NOTES
Primary Nurse Savannah Ko RN and Adenike RN performed a dual skin assessment on this patient No impairment noted  Eliu score is 20

## 2020-05-12 VITALS
TEMPERATURE: 98.8 F | OXYGEN SATURATION: 96 % | SYSTOLIC BLOOD PRESSURE: 136 MMHG | DIASTOLIC BLOOD PRESSURE: 71 MMHG | BODY MASS INDEX: 34.66 KG/M2 | HEART RATE: 81 BPM | HEIGHT: 74 IN | WEIGHT: 270.06 LBS | RESPIRATION RATE: 18 BRPM

## 2020-05-12 LAB
GLUCOSE BLD STRIP.AUTO-MCNC: 153 MG/DL (ref 65–100)
GLUCOSE BLD STRIP.AUTO-MCNC: 162 MG/DL (ref 65–100)
SERVICE CMNT-IMP: ABNORMAL
SERVICE CMNT-IMP: ABNORMAL

## 2020-05-12 PROCEDURE — 74011636637 HC RX REV CODE- 636/637: Performed by: PHYSICIAN ASSISTANT

## 2020-05-12 PROCEDURE — 99218 HC RM OBSERVATION: CPT

## 2020-05-12 PROCEDURE — 74011000258 HC RX REV CODE- 258: Performed by: PHYSICIAN ASSISTANT

## 2020-05-12 PROCEDURE — 74011250637 HC RX REV CODE- 250/637: Performed by: PHYSICIAN ASSISTANT

## 2020-05-12 PROCEDURE — 97602 WOUND(S) CARE NON-SELECTIVE: CPT

## 2020-05-12 PROCEDURE — 82962 GLUCOSE BLOOD TEST: CPT

## 2020-05-12 PROCEDURE — 77010033678 HC OXYGEN DAILY

## 2020-05-12 PROCEDURE — 74011250636 HC RX REV CODE- 250/636: Performed by: PHYSICIAN ASSISTANT

## 2020-05-12 RX ORDER — OXYCODONE HYDROCHLORIDE 5 MG/1
5 TABLET ORAL
Qty: 30 TAB | Refills: 0 | Status: SHIPPED | OUTPATIENT
Start: 2020-05-12 | End: 2020-05-17

## 2020-05-12 RX ADMIN — METFORMIN HYDROCHLORIDE 1000 MG: 500 TABLET ORAL at 06:57

## 2020-05-12 RX ADMIN — INSULIN LISPRO 2 UNITS: 100 INJECTION, SOLUTION INTRAVENOUS; SUBCUTANEOUS at 12:12

## 2020-05-12 RX ADMIN — ACETAMINOPHEN 1000 MG: 500 TABLET, FILM COATED ORAL at 12:12

## 2020-05-12 RX ADMIN — MELATONIN 2 TABLET: at 09:10

## 2020-05-12 RX ADMIN — DEXAMETHASONE SODIUM PHOSPHATE 10 MG: 10 INJECTION, SOLUTION INTRAMUSCULAR; INTRAVENOUS at 06:56

## 2020-05-12 RX ADMIN — ACETAMINOPHEN 1000 MG: 500 TABLET, FILM COATED ORAL at 06:56

## 2020-05-12 RX ADMIN — POLYETHYLENE GLYCOL 3350 17 G: 17 POWDER, FOR SOLUTION ORAL at 09:10

## 2020-05-12 RX ADMIN — CEFAZOLIN SODIUM 3 G: 10 INJECTION, POWDER, FOR SOLUTION INTRAVENOUS at 04:31

## 2020-05-12 RX ADMIN — INSULIN LISPRO 2 UNITS: 100 INJECTION, SOLUTION INTRAVENOUS; SUBCUTANEOUS at 06:56

## 2020-05-12 RX ADMIN — SENNOSIDES AND DOCUSATE SODIUM 1 TABLET: 8.6; 5 TABLET ORAL at 09:10

## 2020-05-12 RX ADMIN — GABAPENTIN 800 MG: 400 CAPSULE ORAL at 09:10

## 2020-05-12 RX ADMIN — KETOROLAC TROMETHAMINE 15 MG: 30 INJECTION, SOLUTION INTRAMUSCULAR at 06:56

## 2020-05-12 NOTE — PROGRESS NOTES
Discharge instructions discussed with patient. Verbalized understanding. New prescribed medications discussed. Opportunity to ask questions given. Signature obtained. Copies given.

## 2020-05-12 NOTE — PROGRESS NOTES
Orthopedic Spine Progress Note  Ezequielarlen Salazar AGACNP-BC  Work Cell: [phone]    Post Op Day: 1 Day Post-Op    May 12, 2020 8:52 AM     Gwen Bee    HPI:      Gwen Bee is a 62 y.o. male with history of prior spinal surgery at C6-7 with another out of town surgeon.  Recent imaging shows residual stenosis at C6-7 with cord impingement as well as spondylosis at C5-6. Chacedaniasammy Novat presented to Dr. Larry Tyler clinic with progressive neck pain,  hand symptoms, and signs of myelopathy again. .After a discussion of the risks, benefits, and alternatives, Gwen Bee consented to undergo a  Procedure(s):  C5-7 ANTERIOR CERVICAL DISCECTOMY WITH FUSION (ESSENTIAL)    Subjective      Patient doing well. Has been up mobilizing and voiding. Throat sore but no trouble swallowing. Patient denies headache, dizziness, chest pain, sob, abdominal pain, fever, chills, or nausea/vomiting. Objective:     Physical Exam:  General:  Alert and oriented. No acute distress. Respiratory:  Breathing unlabored. Equal chest expansion   Abdomen:   CV: Soft, non-tender, non-distended   No edema appreciated in the extremities   Neurologic:    Musculoskeletal:  Speech fluent. No facial droop. Follows commands. RUDD/SILT Motor: unchanged C5-T1. Gait deferred  Calves soft, nontender upon palpation and with passive stretch. Moves both upper and lower extremities. Incision: clean, dry, and intact. No significant erythema or swelling. No active drainage noted. Vital Signs:    Patient Vitals for the past 8 hrs:   BP Temp Pulse Resp SpO2   20 0844 136/71 98.8 °F (37.1 °C) 81 18 96 %   20 0330 114/70 97.6 °F (36.4 °C) 82 18 92 %     Temp (24hrs), Av.3 °F (36.8 °C), Min:97.6 °F (36.4 °C), Max:98.8 °F (37.1 °C)      Intake/Output:  No intake/output data recorded.   05/10 1901 -  0700  In: 1000 [I.V.:1000]  Out: 730 [Urine:700]    Pain Control:   Pain Assessment  Pain Scale 1: Numeric (0 - 10)  Pain Intensity 1: 0  Pain Location 1: Neck  Pain Orientation 1: Anterior  Pain Description 1: Aching  Pain Intervention(s) 1: Medication (see MAR)    LAB:    No results for input(s): HCT, HGB, HCTEXT, HGBEXT in the last 72 hours. Lab Results   Component Value Date/Time    Sodium 140 07/02/2018 04:42 AM    Potassium 3.7 07/02/2018 04:42 AM    Chloride 105 07/02/2018 04:42 AM    CO2 24 07/02/2018 04:42 AM    Glucose 136 (H) 07/02/2018 04:42 AM    BUN 10 07/02/2018 04:42 AM    Creatinine 0.84 07/02/2018 04:42 AM    Calcium 8.6 07/02/2018 04:42 AM       PT/OT:   Gait:                      Assessment/Plan      1. 1 Day Post-Op Procedure(s):  C5-7 ANTERIOR CERVICAL DISCECTOMY WITH FUSION (ESSENTIAL)   -Continue soft collar   -d/c hemovac drain   -Decadron for 4 doses   -Pain control- continue prn oxycodone, toradol, gabapentin,tylenol   -Voiding   -encourage incentive Spirometer   -Tolerating diet   -VTE Prophylaxes - TEDS &SCD  2. DMII   -hgba1c 8.8%   -fbg 153-202 postop   -continue accuchecks, humalog ssi, home actos and metformin  3. HTN, chronic   -c/w home lisinopril    Plan above discussed with Dr. Danisha Wynne    Discharge To:   Home    Signed By: Nighat Carter NP

## 2020-05-12 NOTE — PROGRESS NOTES
RUR 0%- patient observation    [address] via wife today. No therapy HH needs per the patient. Follow up with Spine Physician. Care Management Interventions  PCP Verified by CM: Yes(seen 5-6 weeks ago for pre op)  Palliative Care Criteria Met (RRAT>21 & CHF Dx)?: No  Mode of Transport at Discharge: Self  Transition of Care Consult (CM Consult): Other(Home no needs)  MyChart Signup: No  Discharge Durable Medical Equipment: No  Health Maintenance Reviewed: Yes  Physical Therapy Consult: Yes  Occupational Therapy Consult: Yes  Speech Therapy Consult: No  Current Support Network: Lives with Spouse  Confirm Follow Up Transport: Family  The Plan for Transition of Care is Related to the Following Treatment Goals : Home no needs  The Patient and/or Patient Representative was Provided with a Choice of Provider and Agrees with the Discharge Plan?: Yes  Name of the Patient Representative Who was Provided with a Choice of Provider and Agrees with the Discharge Plan: THe patient   [address] Provided?: No  Discharge Location  Discharge Placement: Home    Reason for Admission:   C5-7 Anterior Cervical Discectomy with Fusion                    RUR Score:     0                Plan for utilizing home health:     NO needs per patient     PCP: First and Last name:  Dr. Marko Rivera   Name of Practice: [facility]   Are you a current patient: Yes/No: Yes   Approximate date of last visit: 5-6 weeks ago for pre op surgery clearance                    Current Advanced Directive/Advance Care Plan: Full Code. Patient declined assistance with AMD                         Transition of Care Plan:   Home with wife-  Not other needs    CRM met with the patient, introduced self, explained role of CRM and offered supports. The patient lives with his wife and is independent. The patient does not use or need DME. Patient's wife will provide transportation home at discharge. The patient declined home health.  He stated that he spoke with the spine NP and he does not need therapy. CRM confirmed PCP- Dr. Torrie Hung seen 5-6 weeks for pre op clearance. The patient uses [facility] in [place] [facility].        Geovanna Tracy, [address] [place] [address]   [phone]

## 2020-05-12 NOTE — OP NOTES
[facility]  OPERATIVE REPORT    Name:  Melquiades Hayward  MR#:  [id number]  :  1955  ACCOUNT #:  [de-identified]  DATE OF SERVICE:  2020    PREOPERATIVE DIAGNOSES:  Status post anterior cervical diskectomy and fusion, C6-7; possible pseudoarthrosis with continued cord compression, C6-7; cervical spondylosis/kyphosis, C5-6. POSTOPERATIVE DIAGNOSES:  Status post anterior cervical diskectomy and fusion, C6-7; possible pseudoarthrosis with continued cord compression, C6-7; cervical spondylosis/kyphosis, C5-6. PROCEDURES PERFORMED:  Exploration of fusion with revision anterior cervical diskectomy and decompression, C5-6 and C6-7; anterior interbody fusion, C5-6 and C6-7; anterior plate fixation, C5, C6, and C7. SURGEON:  Julien Smith MD    ASSISTANT:  Jerardo Arzate PA-C    ANESTHESIA:  General    COMPLICATIONS:  None. SPECIMENS REMOVED:  None    IMPLANTS:  Medtronic Zevo Plate and TCP    ESTIMATED BLOOD LOSS:  Minimal.    INDICATIONS:  This is a pleasant gentleman, who comes in today with chief complaint of chronic neck pain, bilateral arm pain, bilateral shoulder pain. He has had cervical stenosis/spondylosis, C6-7; anterior cervical diskectomy and fusion done. Subsequent studies showed residual cord compression at C6-7 disk space. He is here for revision decompression as well as revision fusion as well as C5-6 anterior interbody fusion due to subsidence of the anterior cervical plate at O3-6 with compromise of the C5-6 disk space. PROCEDURE:  The patient was identified, brought to the operating suite, underwent general anesthesia without difficulty, placed in the supine position with 10 pounds of traction across the neck with a chin strap. Neck was placed in neutral position. Neck was prepped and draped sterilely. 2 g of Ancef was given. Preoperative neuromonitoring was placed and these remained stable throughout the surgical procedure.   After prepping and draping, we did a transverse incision on the left-hand side approximately at the level of the cricoid cartilage. Dissection was carried down through the platysma, blunt dissection medial to sternocleidomastoid down to the deep cervical fascia, which was then carefully dissected and we identified the previous C6-7 plate. This was elevated. Due to the large size of the plate, it was exposed and we removed it en bloc without difficulty. The superior portion of the disk plate had been completely compromised in the C5-6 disk space. At which time, we did use a longus colli and we elevated the longus colli and placed deep radiolucent retractors, starting at the C5-6 level. We identified the residual of the C5-6 disk space. There was a lot of spondylosis and intermittent areas of bony fusion in the C5-6 disk space. There was signficant  disk space compromise due to the screw in the C6 vertebral body migration to the C5-6 disc space. .  We did a complete diskectomy and we carefully took this down with the Midas bur under loupe magnification, elevated the remainder of the longitudinal ligament due to good central decompression as well as bilateral foraminal decompression with undercutting the uncinate processes. At which time, we then addressed the C5-6 level with significant bony overgrowth above the disk space and we carefully took this down. Decompression was continued where they had been complete collapse of the disk space at C5-6 and C6-7. We identified the C6-7 disk space. We carefully took this down posteriorly. Residual of the decompression was performed. We visualized the spinal cord and the residual neural compression was removed. The remainder of the disk material after confirmation was decompressed, at which time hemostasis with bipolar cautery. We clearly could define the spinal cord, no further thecal compression was noted on it. At which time, endplates were rasped to lightly bleeding bone.   We did trial spaces with 16 x 14-mm Medtronic anatomic PEEK graft. These provided good anterior column reconstruction as well as restoration of foraminal height and restoration of cervical lordosis. We then rasped those endplates. We started C6-7 level, impacted the graft into place with 2 mm countersinking. Neuromonitoring was stable. We then did a similar technique at the C5-6 level after creating bony bleeding and stable fixation was again obtained. We then contoured a Medtronic Zevo plate 37 mm in length, temporary fixation with threaded pin, followed by 3.5 x 17-mm screws into C5, C6 and C7 for fixation. Good fixation was obtained. The wound was thoroughly irrigated. A #7 Flat TAMARA drain was placed. 2-0 Vicryl on the subcutaneous layer and 3-0 Vicryl on the skin. The physician assistant was present during the entire operative procedure and assisted in all critical elements of the surgery. No surgical assist or resident was available.        Katy Gaspar MD      [id number]  D:  05/11/2020 14:08  T:  05/11/2020 23:10  JOB #:  [id number]

## 2020-05-12 NOTE — PROGRESS NOTES
Observation notice provided in writing to patient and/or caregiver as well as verbal explanation of the policy. Patients who are in outpatient status also receive the Observation notice.       Rachael Keene, [address] [place] [address] Avenue   [phone]

## 2022-01-18 ENCOUNTER — PATIENT MESSAGE (OUTPATIENT)
Dept: ORTHOPEDIC SURGERY | Age: 67
End: 2022-01-18

## 2022-01-18 ENCOUNTER — TELEPHONE (OUTPATIENT)
Dept: ORTHOPEDIC SURGERY | Age: 67
End: 2022-01-18

## 2022-02-07 ENCOUNTER — PATIENT MESSAGE (OUTPATIENT)
Dept: ORTHOPEDIC SURGERY | Age: 67
End: 2022-02-07

## 2022-03-18 PROBLEM — R33.9 URINARY RETENTION: Status: ACTIVE | Noted: 2018-06-29

## 2022-03-18 PROBLEM — N39.0 UTI (URINARY TRACT INFECTION): Status: ACTIVE | Noted: 2018-06-29

## 2022-03-19 PROBLEM — M96.1 POST LAMINECTOMY SYNDROME: Status: ACTIVE | Noted: 2018-06-29

## 2022-03-19 PROBLEM — K59.00 CONSTIPATED: Status: ACTIVE | Noted: 2018-06-16

## 2022-03-19 PROBLEM — M48.02 CERVICAL STENOSIS OF SPINE: Status: ACTIVE | Noted: 2020-05-11

## 2022-03-19 PROBLEM — M48.062 LUMBAR STENOSIS WITH NEUROGENIC CLAUDICATION: Status: ACTIVE | Noted: 2018-06-13

## 2022-03-19 PROBLEM — L76.82 PAIN AT SURGICAL INCISION: Status: ACTIVE | Noted: 2018-04-19

## 2022-03-19 PROBLEM — R65.10 SIRS (SYSTEMIC INFLAMMATORY RESPONSE SYNDROME) (HCC): Status: ACTIVE | Noted: 2018-06-30

## 2022-03-19 PROBLEM — M47.22 CERVICAL SPONDYLOSIS WITH RADICULOPATHY: Status: ACTIVE | Noted: 2018-04-18

## 2022-03-19 PROBLEM — R50.9 FEVER: Status: ACTIVE | Noted: 2018-06-29

## 2022-03-20 PROBLEM — K59.00 CONSTIPATION: Status: ACTIVE | Noted: 2018-06-29

## 2022-03-20 PROBLEM — E11.29 TYPE II OR UNSPECIFIED TYPE DIABETES MELLITUS WITH RENAL MANIFESTATIONS, UNCONTROLLED(250.42): Status: ACTIVE | Noted: 2018-06-29

## 2022-03-20 PROBLEM — E11.65 TYPE II OR UNSPECIFIED TYPE DIABETES MELLITUS WITH RENAL MANIFESTATIONS, UNCONTROLLED(250.42): Status: ACTIVE | Noted: 2018-06-29

## 2022-03-20 PROBLEM — N12 PYELONEPHRITIS: Status: ACTIVE | Noted: 2018-06-29

## 2022-05-10 ENCOUNTER — OFFICE VISIT (OUTPATIENT)
Dept: ORTHOPEDIC SURGERY | Age: 67
End: 2022-05-10
Payer: MEDICARE

## 2022-05-10 VITALS — BODY MASS INDEX: 37.25 KG/M2 | WEIGHT: 275 LBS | HEIGHT: 72 IN

## 2022-05-10 DIAGNOSIS — Z98.1 S/P LUMBAR FUSION: Primary | ICD-10-CM

## 2022-05-10 DIAGNOSIS — M43.16 SPONDYLOLISTHESIS OF LUMBAR REGION: ICD-10-CM

## 2022-05-10 PROCEDURE — G8536 NO DOC ELDER MAL SCRN: HCPCS | Performed by: ORTHOPAEDIC SURGERY

## 2022-05-10 PROCEDURE — 3017F COLORECTAL CA SCREEN DOC REV: CPT | Performed by: ORTHOPAEDIC SURGERY

## 2022-05-10 PROCEDURE — G8427 DOCREV CUR MEDS BY ELIG CLIN: HCPCS | Performed by: ORTHOPAEDIC SURGERY

## 2022-05-10 PROCEDURE — G8510 SCR DEP NEG, NO PLAN REQD: HCPCS | Performed by: ORTHOPAEDIC SURGERY

## 2022-05-10 PROCEDURE — 1101F PT FALLS ASSESS-DOCD LE1/YR: CPT | Performed by: ORTHOPAEDIC SURGERY

## 2022-05-10 PROCEDURE — 99214 OFFICE O/P EST MOD 30 MIN: CPT | Performed by: ORTHOPAEDIC SURGERY

## 2022-05-10 PROCEDURE — G8417 CALC BMI ABV UP PARAM F/U: HCPCS | Performed by: ORTHOPAEDIC SURGERY

## 2022-05-10 RX ORDER — ACETAMINOPHEN 325 MG/1
TABLET ORAL
COMMUNITY

## 2022-05-10 RX ORDER — GABAPENTIN 800 MG/1
800 TABLET ORAL 2 TIMES DAILY
Qty: 30 TABLET | Refills: 0 | Status: SHIPPED | OUTPATIENT
Start: 2022-05-10

## 2022-05-10 NOTE — LETTER
5/10/2022    Patient: Guerrero Diaz   YOB: 1955   Date of Visit: 5/10/2022     Charline Arellano MD  [address] [place] [address] [address] [facility] [phone]  Via Fax: [phone]    Dear Charline Arellano MD,      Thank you for referring Mr. Sean Lagunas to [facility] for evaluation. My notes for this consultation are attached. If you have questions, please do not hesitate to call me. I look forward to following your patient along with you.       Sincerely,    Basilio Whipple MD

## 2022-05-10 NOTE — PROGRESS NOTES
Nilay Portillo (: 1955) is a 73 y.o. male, patient, here for evaluation of the following chief complaint(s):  LOW BACK PAIN       ASSESSMENT/PLAN:    Below is the assessment and plan developed based on review of pertinent history, physical exam, labs, studies, and medications. At this point he is having increasing symptoms in both lower extremities with difficulty with prolonged standing and walking. The numbness and tingling is also worsening. He has injections which were helping in his lumbar spine are no longer helping. I feel like he had a progression of the spondylolisthesis and stenosis at L3-4. I think we need a new MRI since his last one was in 2019. In the meantime he will start Neurontin twice a day. 1. S/P lumbar fusion  -     XR SPINE LUMB MIN 4 V; Future  2. Spondylolisthesis of lumbar region      No follow-ups on file. SUBJECTIVE/OBJECTIVE:  Nilay Portillo (: 1955) is a 73 y.o. male. No flowsheet data found. He comes in today for follow-up. We last saw him approximately 6 to 7 months ago. He has chronic neck pain and lower back pain. The neck injections are actually helping him at this point he is due for an injection today. Unfortunate the lower back injections have not helped as long and is having increasing difficulty with numbness tingling legs as well as weakness particular on the left side. He denies any bowel bladder difficulties. His last MRI was in 2019. He has not been on any medications currently for his lower back. Imaging:    XR Results (most recent):  Results from Appointment encounter on 05/10/22    XR SPINE LUMB MIN 4 V    Narrative  AP and lateral flexion-extension of the lumbar spine reviewed today. Spondylolisthesis at L3-4 above her previous L4-S1 fusion. Is a grade 1. No acute fractures or lytic lesions. MRI Results (most recent):    No recent results.       No Known Allergies    Current Outpatient Medications   Medication Sig    acetaminophen (TylenoL) 325 mg tablet Take  by mouth every four (4) hours as needed for Pain.  ibuprofen (MOTRIN) 200 mg tablet Take 800 mg by mouth two (2) times daily as needed.  traMADoL (ULTRAM) 50 mg tablet Take 50 mg by mouth every six (6) hours as needed for Pain. (Patient not taking: Reported on 5/10/2022)    gabapentin (NEURONTIN) 800 mg tablet Take 800 mg by mouth two (2) times a day. (Patient not taking: Reported on 5/10/2022)    pioglitazone (ACTOS) 45 mg tablet Take 45 mg by mouth nightly.  cholecalciferol, vitamin D3, (Vitamin D3) 50 mcg (2,000 unit) tab Take 1 Tab by mouth two (2) times a day.  b complex vitamins tablet Take 1 Tab by mouth daily.  lisinopril (PRINIVIL, ZESTRIL) 20 mg tablet Take 20 mg by mouth nightly.  metFORMIN (GLUCOPHAGE) 500 mg tablet Take 1,000 mg by mouth two (2) times daily (with meals). No current facility-administered medications for this visit.        Past Medical History:   Diagnosis Date    Adverse effect of anesthesia 2018    hoarseness  after recent ACDF surgery, some difficulty swallowing    Arthritis     Cancer ([facility] [address]     Basal cell skin cancers    Chronic low back pain     Diabetic neuropathy (HCC)     DM (diabetes mellitus) (HCC)     GERD (gastroesophageal reflux disease)     Hematuria     HTN (hypertension)     Hyperlipidemia     Pyogenic granuloma of skin and subcutaneous tissue     Thyroid disease 1979    PARTIAL THYROIDECTOMY    Varicella         Past Surgical History:   Procedure Laterality Date    HX APPENDECTOMY      HX BACK SURGERY  04/2018    cervical 6    HX BACK SURGERY  06/2018    LUMBAR FUSION    HX ENDOSCOPY      HX HEENT      partial thyroidectomy    HX UROLOGICAL  2010's    Cystoscopy, ureteroscopy with stent placement    NEUROLOGICAL PROCEDURE UNLISTED  LAST 1/2019    Epidural steroid injections       Family History   Problem Relation Age of Onset    Cancer Father ASBESTOSIS    Cancer Mother         PANCREATIC CA    Diabetes Mother     Cancer Sister         BREAST CA    Diabetes Sister     Cancer Brother         MELANOMA    Cancer Sister         BREAST CA    Heart Disease Neg Hx     Hypertension Neg Hx         Social History     Tobacco Use    Smoking status: Never Smoker    Smokeless tobacco: Never Used   Vaping Use    Vaping Use: Not on file   Substance Use Topics    Alcohol use: No     Alcohol/week: 0.0 standard drinks    Drug use: No        Review of Systems   Musculoskeletal: Positive for back pain, gait problem and neck pain. All other systems reviewed and are negative. Vitals:  Ht 6' (1.829 m)   Wt 275 lb (124.7 kg)   BMI 37.30 kg/m²    Body mass index is 37.3 kg/m². Ortho Exam       Alert and [place]  x 3    Normal gait and station; normal posture    No assistive devices today. Cervical spine:  Examination of the cervical spine demonstrates no tenderness on palpation, no pain, no swelling or edema, with normal lumbar range of motion. Thoracic spine: Examination of the thoracic spine demonstrates no tenderness on palpation, no pain, no swelling or edema. Normal sensation and range of motion. Lumbar spine:     Examination of the lumbar spine demonstrates on inspection: Previous surgical incisions are well-healed. No pelvic obliquity. Mild straightening lumbar lordosis. No masses. On palpation: Generalized tenderness palpation lumbar sacral junction. No significant muscular spasm noted. Range of motion: No significant changes in range of motion.     Motor examination:  Right iliopsoas 5/5,  left iliopsoas 5/5, right quad 5/5, left quad 5/5, right anterior tibialis 5/5, left anterior tibialis 5/5, right EHL 5/5, left EHL 5/5, right gastrocnemius 5/5 , left gastrocnemius 5/5    Sensory examination: Reveals distal neuropathy    Reflexes: Left knee 2/2, right knee 2/2, right ankle 2/2, left ankle 2/2    Functional testing: Straight leg test negative bilaterally; bowstring sign negative bilaterally    Babinsksi and Clonus negative bilaterally. An electronic signature was used to authenticate this note.   -- Teddi Dandy, MD

## 2022-09-20 ENCOUNTER — OFFICE VISIT (OUTPATIENT)
Dept: ORTHOPEDIC SURGERY | Age: 67
End: 2022-09-20
Payer: MEDICARE

## 2022-09-20 VITALS — HEIGHT: 72 IN | BODY MASS INDEX: 23.84 KG/M2 | WEIGHT: 176 LBS

## 2022-09-20 DIAGNOSIS — M43.16 SPONDYLOLISTHESIS OF LUMBAR REGION: Primary | ICD-10-CM

## 2022-09-20 DIAGNOSIS — M48.062 SPINAL STENOSIS OF LUMBAR REGION WITH NEUROGENIC CLAUDICATION: ICD-10-CM

## 2022-09-20 DIAGNOSIS — Z98.1 S/P LUMBAR FUSION: ICD-10-CM

## 2022-09-20 PROCEDURE — G8432 DEP SCR NOT DOC, RNG: HCPCS | Performed by: ORTHOPAEDIC SURGERY

## 2022-09-20 PROCEDURE — 1101F PT FALLS ASSESS-DOCD LE1/YR: CPT | Performed by: ORTHOPAEDIC SURGERY

## 2022-09-20 PROCEDURE — 99214 OFFICE O/P EST MOD 30 MIN: CPT | Performed by: ORTHOPAEDIC SURGERY

## 2022-09-20 PROCEDURE — G8427 DOCREV CUR MEDS BY ELIG CLIN: HCPCS | Performed by: ORTHOPAEDIC SURGERY

## 2022-09-20 PROCEDURE — G8420 CALC BMI NORM PARAMETERS: HCPCS | Performed by: ORTHOPAEDIC SURGERY

## 2022-09-20 PROCEDURE — G8536 NO DOC ELDER MAL SCRN: HCPCS | Performed by: ORTHOPAEDIC SURGERY

## 2022-09-20 PROCEDURE — 1123F ACP DISCUSS/DSCN MKR DOCD: CPT | Performed by: ORTHOPAEDIC SURGERY

## 2022-09-20 PROCEDURE — 3017F COLORECTAL CA SCREEN DOC REV: CPT | Performed by: ORTHOPAEDIC SURGERY

## 2022-09-20 NOTE — LETTER
9/20/2022    Patient: Angely Freeman   YOB: 1955   Date of Visit: 9/20/2022     Keyon Baltazar MD  [address] [place] [address] [address] [facility] [phone] [id number]  Via Fax: [phone]    Dear Keyon Baltazar MD,      Thank you for referring Mr. Kelly Christiansen to [facility] for evaluation. My notes for this consultation are attached. If you have questions, please do not hesitate to call me. I look forward to following your patient along with you.       Sincerely,    Trina Charles MD

## 2022-09-20 NOTE — PROGRESS NOTES
Karissa Johns (: 1955) is a 78 y.o. male, patient, here for evaluation of the following chief complaint(s):  LOW BACK PAIN       ASSESSMENT/PLAN:    Below is the assessment and plan developed based on review of pertinent history, physical exam, labs, studies, and medications. At this point he is having increasing symptoms in both lower extremities with difficulty with prolonged standing and walking. The numbness and tingling is also worsening. The MRI was reviewed today. He continues with some neurogenic claudication. Ultimately he is going to need a revision laminectomy and extension of his fusion. We will see his back in 3 to 6 months as symptoms require. He will continue with over-the-counter medications as well as continue with pain management with injections. 1. Spondylolisthesis of lumbar region  2. S/P lumbar fusion  3. Spinal stenosis of lumbar region with neurogenic claudication      No follow-ups on file. SUBJECTIVE/OBJECTIVE:  Karissa Johns (: 1955) is a 78 y.o. male. Pain Assessment  2022   Severity of Pain 3        He comes in today for follow-up. We last saw him approximately 6 to 7 months ago. He has chronic neck pain and lower back pain. The neck injections are actually helping him at this point he is due for an injection today. Unfortunate the lower back injections have not helped as long and is having increasing difficulty with numbness tingling legs as well as weakness particular on the left side. He denies any bowel bladder difficulties he is here for follow-up of his MRI that we performed today. No real changes in symptoms. He still has the numbness and tingling in the bowel bladder. Imaging:    XR Results (most recent):  Results from Appointment encounter on 05/10/22    XR SPINE LUMB MIN 4 V    Narrative  AP and lateral flexion-extension of the lumbar spine reviewed today. Spondylolisthesis at L3-4 above her previous L4-S1 fusion. Is a grade 1. No acute fractures or lytic lesions. MRI Results (most recent):    No recent results. No Known Allergies    Current Outpatient Medications   Medication Sig    acetaminophen (TYLENOL) 325 mg tablet Take  by mouth every four (4) hours as needed for Pain.    gabapentin (NEURONTIN) 800 mg tablet Take 1 Tablet by mouth two (2) times a day. Max Daily Amount: 1,600 mg.    ibuprofen (MOTRIN) 200 mg tablet Take 800 mg by mouth two (2) times daily as needed. pioglitazone (ACTOS) 45 mg tablet Take 45 mg by mouth nightly. cholecalciferol, vitamin D3, 50 mcg (2,000 unit) tab Take 1 Tab by mouth two (2) times a day. b complex vitamins tablet Take 1 Tab by mouth daily. lisinopril (PRINIVIL, ZESTRIL) 20 mg tablet Take 20 mg by mouth nightly. metFORMIN (GLUCOPHAGE) 500 mg tablet Take 1,000 mg by mouth two (2) times daily (with meals). traMADoL (ULTRAM) 50 mg tablet Take 50 mg by mouth every six (6) hours as needed for Pain. (Patient not taking: Reported on 9/20/2022)     No current facility-administered medications for this visit.        Past Medical History:   Diagnosis Date    Adverse effect of anesthesia 2018    hoarseness  after recent ACDF surgery, some difficulty swallowing    Arthritis     Cancer (HCC)     Basal cell skin cancers    Chronic low back pain     Diabetic neuropathy (HCC)     DM (diabetes mellitus) ([facility] [address]     GERD (gastroesophageal reflux disease)     Hematuria     HTN (hypertension)     Hyperlipidemia     Pyogenic granuloma of skin and subcutaneous tissue     Thyroid disease 1979    PARTIAL THYROIDECTOMY    Varicella         Past Surgical History:   Procedure Laterality Date    HX APPENDECTOMY      HX BACK SURGERY  04/2018    cervical 6    HX BACK SURGERY  06/2018    LUMBAR FUSION    HX ENDOSCOPY      HX HEENT      partial thyroidectomy    HX UROLOGICAL  2010's    Cystoscopy, ureteroscopy with stent placement    NEUROLOGICAL PROCEDURE UNLISTED  LAST 1/2019 Epidural steroid injections       Family History   Problem Relation Age of Onset    Cancer Father         ASBESTOSIS    Cancer Mother         PANCREATIC CA    Diabetes Mother     Cancer Sister         BREAST CA    Diabetes Sister     Cancer Brother         MELANOMA    Cancer Sister         BREAST CA    Heart Disease Neg Hx     Hypertension Neg Hx         Social History     Tobacco Use    Smoking status: Never    Smokeless tobacco: Never   Substance Use Topics    Alcohol use: No     Alcohol/week: 0.0 standard drinks    Drug use: No        Review of Systems   Musculoskeletal:  Positive for back pain, gait problem and neck pain. All other systems reviewed and are negative. Vitals:  Ht 6' (1.829 m)   Wt 176 lb (79.8 kg)   BMI 23.87 kg/m²    Body mass index is 23.87 kg/m². Ortho Exam       Alert and [place]  x 3    Normal gait and station; normal posture    No assistive devices today. Cervical spine:  Examination of the cervical spine demonstrates no tenderness on palpation, no pain, no swelling or edema, with normal lumbar range of motion. Thoracic spine: Examination of the thoracic spine demonstrates no tenderness on palpation, no pain, no swelling or edema. Normal sensation and range of motion. Lumbar spine:     Examination of the lumbar spine demonstrates on inspection: Previous surgical incisions are well-healed. No pelvic obliquity. Mild straightening lumbar lordosis. No masses. On palpation: Generalized tenderness palpation lumbar sacral junction. No significant muscular spasm noted. Range of motion: No significant changes in range of motion.     Motor examination:  Right iliopsoas 5/5,  left iliopsoas 5/5, right quad 5/5, left quad 5/5, right anterior tibialis 5/5, left anterior tibialis 5/5, right EHL 5/5, left EHL 5/5, right gastrocnemius 5/5 , left gastrocnemius 5/5    Sensory examination: Reveals distal neuropathy    Reflexes: Left knee 2/2, right knee 2/2, right ankle 2/2, left ankle 2/2    Functional testing: Straight leg test negative bilaterally; bowstring sign negative bilaterally    Babinsksi and Clonus negative bilaterally. An electronic signature was used to authenticate this note.   -- Claudia Campuzano MD

## (undated) DEVICE — SOL INJ SOD CL 0.9% 1000ML BG --

## (undated) DEVICE — SYR LR LCK 1ML GRAD NSAF 30ML --

## (undated) DEVICE — SUTURE MCRYL SZ 3-0 L27IN ABSRB UD L24MM PS-1 3/8 CIR PRIM Y936H

## (undated) DEVICE — DRAIN SURG FLAT W7MMXL20CM FULL PERF

## (undated) DEVICE — SOL IRRIGATION INJ NACL 0.9% 500ML BTL

## (undated) DEVICE — STERILE MEDICINE CUP 2 OZ KIT: Brand: CARDINAL HEALTH

## (undated) DEVICE — TOOL 14MH30 LEGEND 14CM 3MM: Brand: MIDAS REX ™

## (undated) DEVICE — SPONGE: SPECIALTY PEANUT XR 100/CS: Brand: MEDICAL ACTION INDUSTRIES

## (undated) DEVICE — SOLUTION IV 1000ML 0.9% SOD CHL

## (undated) DEVICE — 3M™ STERI-DRAPE™ INSTRUMENT POUCH 1018: Brand: STERI-DRAPE™

## (undated) DEVICE — DRAIN KT WND 10FR RND 400ML --

## (undated) DEVICE — WAX SURG 2.5GM HEMSTAT BNE BEESWAX PARAFFIN ISO PALMITATE

## (undated) DEVICE — INTENDED FOR TISSUE SEPARATION, AND OTHER PROCEDURES THAT REQUIRE A SHARP SURGICAL BLADE TO PUNCTURE OR CUT.: Brand: BARD-PARKER SAFETY BLADES SIZE 15, STERILE

## (undated) DEVICE — STERILE POLYISOPRENE POWDER-FREE SURGICAL GLOVES: Brand: PROTEXIS

## (undated) DEVICE — DRAPE,UTILITY,XL,4/PK,STERILE: Brand: MEDLINE

## (undated) DEVICE — LAMINECTOMY RICHMOND-LF: Brand: MEDLINE INDUSTRIES, INC.

## (undated) DEVICE — REM POLYHESIVE ADULT PATIENT RETURN ELECTRODE: Brand: VALLEYLAB

## (undated) DEVICE — COVER,TABLE,HEAVY DUTY,60"X90",STRL: Brand: MEDLINE

## (undated) DEVICE — DRAPE TWL SURG 16X26IN BLU ORB04] ALLCARE INC]

## (undated) DEVICE — SUTURE VCRL 2-0 L27IN ABSRB UD CP-2 L26MM 1/2 CIR REV CUT J869H

## (undated) DEVICE — SINGLE PORT MANIFOLD: Brand: NEPTUNE 2

## (undated) DEVICE — NDL SPNE QNCKE 18GX3.5IN LF --

## (undated) DEVICE — STERILE POLYISOPRENE POWDER-FREE SURGICAL GLOVES WITH EMOLLIENT COATING: Brand: PROTEXIS

## (undated) DEVICE — SUTURE VCRL + SZ 0 L18IN ABSRB UD L36MM CT-1 1/2 CIR VCP840D

## (undated) DEVICE — 3M™ IOBAN™ 2 ANTIMICROBIAL INCISE DRAPE 6650EZ: Brand: IOBAN™ 2

## (undated) DEVICE — (D)PREP SKN CHLRAPRP APPL 26ML -- CONVERT TO ITEM 371833

## (undated) DEVICE — 1010 S-DRAPE TOWEL DRAPE 10/BX: Brand: STERI-DRAPE™

## (undated) DEVICE — MASTISOL ADHESIVE LIQ 2/3ML

## (undated) DEVICE — INFECTION CONTROL KIT SYS

## (undated) DEVICE — CORD BPLR L12FT DISP

## (undated) DEVICE — DEVON™ KNEE AND BODY STRAP 60" X 3" (1.5 M X 7.6 CM): Brand: DEVON

## (undated) DEVICE — COVER,MAYO STAND,STERILE: Brand: MEDLINE

## (undated) DEVICE — GOWN,SIRUS,NONRNF,SETINSLV,2XL,18/CS: Brand: MEDLINE

## (undated) DEVICE — FLOSEAL HEMOSTATIC MATRIX, 5ML: Brand: FLOSEAL HEMOSTATIC MATRIX

## (undated) DEVICE — TOOL 9MH30D LEGEND 9CM 3MM MH DIAM: Brand: MIDAS REX

## (undated) DEVICE — PREP SKN PREVAIL 40ML APPL --

## (undated) DEVICE — IRRIGATOR SUCT BTTRY OPERATED FOR PULSED LAV SIMPULSE SOLO

## (undated) DEVICE — DRAPE MICSCP W54XL150IN STD OCU CVR CLEARLENS TECHNOLOGY FOR

## (undated) DEVICE — PACK PROCEDURE SURG ANTR CERV DISCECTOMY

## (undated) DEVICE — X-RAY SPONGES,12 PLY: Brand: DERMACEA

## (undated) DEVICE — INSULATED BLADE ELECTRODE: Brand: EDGE

## (undated) DEVICE — BIPOLAR IRRIGATOR INTEGRATED TUBING AND BIPOLAR CORD SET, DISPOSABLE

## (undated) DEVICE — SPONGE GZ W4XL4IN COT 12 PLY TYP VII WVN C FLD DSGN

## (undated) DEVICE — SYRINGE BLB 50CC IRRIG PLIABLE FNGR FLNG GRAD FLSK DISP

## (undated) DEVICE — BIPOLAR SEALER 23-121-1 AQM EVS: Brand: AQUAMANTYS™

## (undated) DEVICE — SUT MONOCRYL PLUS UD 3-0 --

## (undated) DEVICE — DRAPE XR C ARM 41X74IN LF --

## (undated) DEVICE — KENDALL SCD EXPRESS SLEEVES, KNEE LENGTH, MEDIUM: Brand: KENDALL SCD

## (undated) DEVICE — Device

## (undated) DEVICE — HALTER TRACTION HD W/ TRI COTTON LINING FOAM LTX

## (undated) DEVICE — DRILL BIT 7080510 11 MM DRILL BIT S

## (undated) DEVICE — SUT ETHLN 3-0 18IN PS1 BLK --

## (undated) DEVICE — DRAPE,THYROID,SOFT,STERILE: Brand: MEDLINE

## (undated) DEVICE — BONE WAX WHITE: Brand: BONE WAX WHITE

## (undated) DEVICE — SYR 50ML LR LCK 1ML GRAD NSAF --

## (undated) DEVICE — HANDPIECE SET WITH FAN SPRAY TIP: Brand: INTERPULSE

## (undated) DEVICE — CATHETERIZATION TRAY 16 FR FOL DRAINAGE BG LUBRI-SIL IC

## (undated) DEVICE — RESERVOIR,SUCTION,100CC,SILICONE: Brand: MEDLINE

## (undated) DEVICE — 7 FRENCH DRAIN SYSTEM, STERILE: Brand: TLS

## (undated) DEVICE — SUTURE MCRYL SZ 4-0 L18IN ABSRB UD L16MM PC-3 3/8 CIR PRIM Y845G

## (undated) DEVICE — DRAPE,LAPAROTOMY,T,PEDI,STERILE: Brand: MEDLINE

## (undated) DEVICE — GARMENT,MEDLINE,DVT,INT,CALF,MED, GEN2: Brand: MEDLINE

## (undated) DEVICE — SKIN MARKER,REGULAR TIP WITH RULER AND LABELS: Brand: DEVON

## (undated) DEVICE — TOOL 14MH30D LEGEND 14CM 3MM MH DIAM: Brand: MIDAS REX ™

## (undated) DEVICE — SUTURE VCRL SZ 2 0 L18IN ABSRB UD L22MM OS 4 1 2 CIR REV CUT J748T

## (undated) DEVICE — HEAD REST BAGEL: Brand: DEVON

## (undated) DEVICE — CATH IV AUTOGRD BC GRN 18GA 30 -- INSYTE

## (undated) DEVICE — CATH IV AUTOGRD ORN 14GA 45MM -- INSYTE-N

## (undated) DEVICE — SUT ETHLN 3-0 18IN PS2 BLK --

## (undated) DEVICE — TOOL 9MH30 LEGEND 9CM 3MM MH: Brand: MIDAS REX

## (undated) DEVICE — SUTURE VCRL + SZ 1 L18IN ABSRB UD L36MM CT-1 1/2 CIR VCP841D

## (undated) DEVICE — PACKING 8004000 NEURAY 200PK 13X13MM: Brand: NEURAY ®

## (undated) DEVICE — MAYO STAND COVER: Brand: CONVERTORS

## (undated) DEVICE — LIMB HOLDER, WRIST/ANKLE: Brand: DEROYAL

## (undated) DEVICE — SOLUTION IV 250ML 0.9% SOD CHL CLR INJ FLX BG CONT PRT CLSR

## (undated) DEVICE — BIPOLAR SEALER 23-301-1 AQM MBS: Brand: AQUAMANTYS™

## (undated) DEVICE — NEEDLE HYPO 22GA L1.5IN BLK S STL HUB POLYPR SHLD REG BVL

## (undated) DEVICE — TAPE,CLOTH/SILK,CURAD,3"X10YD,LF,40/CS: Brand: CURAD

## (undated) DEVICE — PACK PROCEDURE SURG LAMINECTOMY SPINE CUST

## (undated) DEVICE — DERMABOND SKIN ADH 0.7ML -- DERMABOND ADVANCED 12/BX

## (undated) DEVICE — SPONGE LAP 18X18IN STRL -- 5/PK